# Patient Record
Sex: FEMALE | Race: WHITE | HISPANIC OR LATINO | Employment: OTHER | URBAN - METROPOLITAN AREA
[De-identification: names, ages, dates, MRNs, and addresses within clinical notes are randomized per-mention and may not be internally consistent; named-entity substitution may affect disease eponyms.]

---

## 2017-02-14 ENCOUNTER — TELEPHONE (OUTPATIENT)
Dept: OPHTHALMOLOGY | Facility: CLINIC | Age: 67
End: 2017-02-14

## 2017-02-14 DIAGNOSIS — H25.11 NUCLEAR SCLEROTIC CATARACT OF RIGHT EYE: Primary | ICD-10-CM

## 2017-03-02 ENCOUNTER — TELEPHONE (OUTPATIENT)
Dept: OPHTHALMOLOGY | Facility: CLINIC | Age: 67
End: 2017-03-02

## 2017-03-02 ENCOUNTER — TELEPHONE (OUTPATIENT)
Dept: OPTOMETRY | Facility: CLINIC | Age: 67
End: 2017-03-02

## 2017-03-02 NOTE — TELEPHONE ENCOUNTER
I spoke to .  Advised him to start surgery drops Saturday, one drop three times a day into the Right Eye.  He voiced understanding and will call back should they have any additional questions/concerns.

## 2017-03-02 NOTE — TELEPHONE ENCOUNTER
----- Message from William Fitzgerald sent at 3/1/2017 11:48 AM CST -----  Contact: Mr Pacheco  Pt has questions about medication prior to Sx,please call back at [phone] or [phone],thanks

## 2017-03-06 ENCOUNTER — ANESTHESIA EVENT (OUTPATIENT)
Dept: SURGERY | Facility: OTHER | Age: 67
End: 2017-03-06
Payer: MEDICARE

## 2017-03-06 ENCOUNTER — ANESTHESIA (OUTPATIENT)
Dept: SURGERY | Facility: OTHER | Age: 67
End: 2017-03-06
Payer: MEDICARE

## 2017-03-06 PROCEDURE — 63600175 PHARM REV CODE 636 W HCPCS: Performed by: NURSE ANESTHETIST, CERTIFIED REGISTERED

## 2017-03-06 RX ORDER — MIDAZOLAM HYDROCHLORIDE 1 MG/ML
INJECTION INTRAMUSCULAR; INTRAVENOUS
Status: DISCONTINUED | OUTPATIENT
Start: 2017-03-06 | End: 2017-03-06

## 2017-03-06 RX ADMIN — MIDAZOLAM HYDROCHLORIDE 2 MG: 1 INJECTION, SOLUTION INTRAMUSCULAR; INTRAVENOUS at 07:03

## 2017-03-06 NOTE — ANESTHESIA PREPROCEDURE EVALUATION
03/06/2017  Ines Pacheco is a 66 y.o., female.    [facility] Anesthesia Evaluation    I have reviewed the Patient Summary Reports.    I have reviewed the Nursing Notes.   I have reviewed the Medications.     Review of Systems  Anesthesia Hx:  No problems with previous Anesthesia  Denies Family Hx of Anesthesia complications.   Denies Personal Hx of Anesthesia complications.   Social:  Non-Smoker    Hematology/Oncology:  Hematology Normal        Cardiovascular:   Hypertension    Pulmonary:  Pulmonary Normal    Renal/:  Renal/ Normal     Hepatic/GI:  Hepatic/GI Normal    Musculoskeletal:   Arthritis     Neurological:  Neurology Normal    Endocrine:  Endocrine Normal        Physical Exam  General:  Well nourished    Airway/Jaw/Neck:  Airway Findings: Mouth Opening: Normal Tongue: Normal  General Airway Assessment: Adult  Mallampati: II  TM Distance: Normal, at least 6 cm         Dental:  Dental Findings:Left upper permanenet bridge             Anesthesia Plan  Type of Anesthesia, risks & benefits discussed:  Anesthesia Type:  MAC  Patient's Preference:   Intra-op Monitoring Plan:   Intra-op Monitoring Plan Comments:   Post Op Pain Control Plan:   Post Op Pain Control Plan Comments:   Induction:    Beta Blocker:         Informed Consent: Patient understands risks and agrees with Anesthesia plan.  Questions answered. Anesthesia consent signed with patient.  ASA Score: 2     Day of Surgery Review of History & Physical:    H&P update referred to the surgeon.         Ready For Surgery From Anesthesia Perspective.

## 2017-03-06 NOTE — TRANSFER OF CARE
"Anesthesia Transfer of Care Note    Patient: Ines Pacheco    Procedure(s) Performed: Procedure(s) (LRB):  PHACOEMULSIFICATION-ASPIRATION-CATARACT (Right)  INSERTION-INTRAOCULAR LENS (IOL) (Right)    Patient location: [facility]    Anesthesia Type: MAC    Transport from OR: Transported from OR on room air with adequate spontaneous ventilation    Post pain: adequate analgesia    Post assessment: no apparent anesthetic complications and tolerated procedure well    Post vital signs: stable    Level of consciousness: awake, alert and oriented    Nausea/Vomiting: no nausea/vomiting    Complications: none          Last vitals:   Visit Vitals    BP (!) 144/70 (BP Location: Left arm, Patient Position: Lying, BP Method: Automatic)    Pulse 98    Temp 37 °C (98.6 °F) (Oral)    Resp 16    Ht 5' 4" (1.626 m)    Wt 61.2 kg (135 lb)    SpO2 98%    Breastfeeding No    BMI 23.17 kg/m2     "

## 2017-03-06 NOTE — ANESTHESIA POSTPROCEDURE EVALUATION
"Anesthesia Post Evaluation    Patient: Ines Pacheco    Procedure(s) Performed: Procedure(s) (LRB):  PHACOEMULSIFICATION-ASPIRATION-CATARACT (Right)  INSERTION-INTRAOCULAR LENS (IOL) (Right)    Final Anesthesia Type: MAC  Patient location during evaluation: [facility]  Patient participation: Yes- Able to Participate  Level of consciousness: awake and alert and oriented  Post-procedure vital signs: reviewed and stable  Pain management: adequate  Airway patency: patent  PONV status at discharge: No PONV  Anesthetic complications: no      Cardiovascular status: hemodynamically stable  Respiratory status: unassisted, spontaneous ventilation and room air  Hydration status: euvolemic  Follow-up not needed.        Visit Vitals    BP (!) 144/70 (BP Location: Left arm, Patient Position: Lying, BP Method: Automatic)    Pulse 98    Temp 37 °C (98.6 °F) (Oral)    Resp 16    Ht 5' 4" (1.626 m)    Wt 61.2 kg (135 lb)    SpO2 98%    Breastfeeding No    BMI 23.17 kg/m2       Pain/Fanta Score: Pain Assessment Performed: Yes (3/6/2017  5:53 AM)  Presence of Pain: denies (3/6/2017  5:53 AM)      "

## 2017-03-07 ENCOUNTER — OFFICE VISIT (OUTPATIENT)
Dept: OPHTHALMOLOGY | Facility: CLINIC | Age: 67
End: 2017-03-07
Attending: OPHTHALMOLOGY
Payer: MEDICARE

## 2017-03-07 DIAGNOSIS — Z98.890 POST-OPERATIVE STATE: Primary | ICD-10-CM

## 2017-03-07 DIAGNOSIS — H25.11 NUCLEAR SCLEROTIC CATARACT OF RIGHT EYE: ICD-10-CM

## 2017-03-07 PROCEDURE — 99999 PR PBB SHADOW E&M-EST. PATIENT-LVL II: CPT | Mod: PBBFAC,,, | Performed by: OPHTHALMOLOGY

## 2017-03-07 PROCEDURE — 99024 POSTOP FOLLOW-UP VISIT: CPT | Mod: S$GLB,,, | Performed by: OPHTHALMOLOGY

## 2017-03-07 NOTE — PROGRESS NOTES
HPI     Post-op Evaluation    Additional comments: POD 1 Phaco OD           Comments   POD 1 Phaco w/IOL OD March 6, 2017    MEDS:    Pred/Gati/Nepaf TID OD    Patient states she is doing well with no complaints.       Last edited by Mariam Meyer on 3/7/2017  8:18 AM. (History)            Assessment /Plan     For exam results, see Encounter Report.    Post-operative state    Nuclear sclerotic cataract of right eye      Slit lamp exam:  L/L: nl  K: clear, wound sealed  AC: 1+ cell  Lens: IOL centered and stable    POD1 s/p Phaco/IOL  Appropriate precautions and post op medications reviewed.  Patient instructed to call or come in if symptoms of redness, decreased vision, or pain are experienced.

## 2017-03-07 NOTE — MR AVS SNAPSHOT
[facility]  [address]  [place] [address]  Phone: [phone]  Fax: [phone]                  Ines Pacheco   3/7/2017 8:00 AM   Office Visit    Descripción:  Female : 1950   Personal Médico:  Randee Mendez MD   Departamento:  [facility]           Razón de la lam     Post-op Evaluation           Diagnósticos de Esta Visita        Comentarios    Post-operative state    -  Primario     Nuclear sclerotic cataract of right eye                Lista de tareas           Citas próximas        Personal Médico Departamento Tfno del dpto    2017 10:00 AM Jose Fang MD [facility] [phone]      Metas (5 Years of Data)     Ninguna      Ochskirill en Llamada     Ochskirill En Llamada Línea de Enfermeras - Asistencia   Enfermeras registradas de [facility] pueden ayudarle a reservar baron lam, proveer educación para la david, asesoría clínica, y otros servicios de asesoramiento.   Llame para mary servicio gratuito a [phone].             Medicamentos           Mensaje sobre Medicamentos     Verificar los cambios y / o adiciones a jeong régimen de medicación son los mismos que discutir con jeong médico. Si cualquiera de estos cambios o adiciones son incorrectos, por favor notifique a jeong proveedor de atención médica.             Verifique que la siguiente lista de medicamentos es baron representación exacta de los medicamentos que está tomando actualmente. Si no hay ningunos reportados, la lista puede estar en telles. Si no es correcta, por favor póngase en contacto con jeong proveedor de atención médica. Lleve esta lista con usted en smitha de emergencia.           Medicamentos Actuales     amlodipine-benazepril 10-20mg (LOTREL) 10-20 mg per capsule Take 1 capsule by mouth once daily.    ammonium lactate 12 % Crea Apply to feet twice daily.    atorvastatin (LIPITOR) 10 MG tablet Take 1 tablet (10 mg total) by mouth once daily.     hydrochlorothiazide (HYDRODIURIL) 12.5 MG Tab Take 12.5 mg by mouth once daily.    calcium carbonate-vit D3-min 600 mg calcium- 400 unit Tab Take 1 tablet by mouth 2 (two) times daily.           Información de referencia clínica           Alergias     A partir del:  3/7/2017        No Known Allergies      Vacunas     Administradas en la fecha de la visita:  3/7/2017        None      Language Assistance Services     ATTENTION: Language assistance services are available, free of charge. Please call [phone].      ATENCIÓN: Si habla español, tiene a jeong disposición servicios gratuitos de asistencia lingüística. Llame al [phone].     CHÚ Ý: N?u b?n nói Ti?ng Vi?t, có các d?ch v? h? tr? ngôn ng? mi?n phí dành cho b?n. G?i s? [phone].         [facility] cumple con las leyes federales aplicables de derechos civiles y no discrimina por motivos de katie, color, origen nacional, edad, discapacidad, o sexo.                 Ines Pacheco   3/7/2017 8:00 AM   Office Visit    Description:  Female : 1950   Provider:  Randee Mendez MD   Department:  [facility]           Reason for Visit     Post-op Evaluation           Diagnoses this Visit        Comments    Post-operative state    -  Primary     Nuclear sclerotic cataract of right eye                To Do List           Future Appointments        Provider Department Dept Phone    2017 10:00 AM Jose Fang MD [facility] [phone]      Goals     None      [facility]     [facility] -  Assistance  Registered nurses in the [facility] On Call Center provide clinical advisement, health education, appointment booking, and other advisory services.  Call for this free service at [phone].             Medications           Message regarding Medications     Verify the changes and/or additions to your medication regime listed below are the same as discussed with your  clinician today.  If any of these changes or additions are incorrect, please notify your healthcare provider.             Verify that the below list of medications is an accurate representation of the medications you are currently taking.  If none reported, the list may be blank. If incorrect, please contact your healthcare provider. Carry this list with you in case of emergency.           Current Medications     amlodipine-benazepril 10-20mg (LOTREL) 10-20 mg per capsule Take 1 capsule by mouth once daily.    ammonium lactate 12 % Crea Apply to feet twice daily.    atorvastatin (LIPITOR) 10 MG tablet Take 1 tablet (10 mg total) by mouth once daily.    hydrochlorothiazide (HYDRODIURIL) 12.5 MG Tab Take 12.5 mg by mouth once daily.    calcium carbonate-vit D3-min 600 mg calcium- 400 unit Tab Take 1 tablet by mouth 2 (two) times daily.           Clinical Reference Information           Allergies as of 3/7/2017     No Known Allergies      Immunizations Administered on Date of Encounter - 3/7/2017     None      Language Assistance Services     ATTENTION: Language assistance services are available, free of charge. Please call [phone].      ATENCIÓN: Si habla español, tiene a jeong disposición servicios gratuitos de asistencia lingüística. Llame al [phone].     EUN Ý: N?u b?n nói Ti?ng Vi?t, có các d?ch v? h? tr? ngôn ng? mi?n phí dành cho b?n. G?i s? [phone].         [facility] complies with applicable Federal civil rights laws and does not discriminate on the basis of race, color, national origin, age, disability, or sex.

## 2017-03-14 ENCOUNTER — OFFICE VISIT (OUTPATIENT)
Dept: OPHTHALMOLOGY | Facility: CLINIC | Age: 67
End: 2017-03-14
Attending: OPHTHALMOLOGY
Payer: MEDICARE

## 2017-03-14 ENCOUNTER — TELEPHONE (OUTPATIENT)
Dept: OPHTHALMOLOGY | Facility: CLINIC | Age: 67
End: 2017-03-14

## 2017-03-14 DIAGNOSIS — H25.12 NUCLEAR SCLEROSIS, LEFT: ICD-10-CM

## 2017-03-14 DIAGNOSIS — H25.12 NUCLEAR SCLEROTIC CATARACT OF LEFT EYE: Primary | ICD-10-CM

## 2017-03-14 DIAGNOSIS — H25.11 NUCLEAR SCLEROTIC CATARACT OF RIGHT EYE: ICD-10-CM

## 2017-03-14 DIAGNOSIS — Z98.890 POST-OPERATIVE STATE: Primary | ICD-10-CM

## 2017-03-14 PROCEDURE — 99024 POSTOP FOLLOW-UP VISIT: CPT | Mod: S$GLB,,, | Performed by: OPHTHALMOLOGY

## 2017-03-14 PROCEDURE — 99999 PR PBB SHADOW E&M-EST. PATIENT-LVL II: CPT | Mod: PBBFAC,,, | Performed by: OPHTHALMOLOGY

## 2017-03-14 RX ORDER — TROPICAMIDE 10 MG/ML
1 SOLUTION/ DROPS OPHTHALMIC
Status: CANCELLED | OUTPATIENT
Start: 2017-03-14

## 2017-03-14 RX ORDER — TETRACAINE HYDROCHLORIDE 5 MG/ML
1 SOLUTION OPHTHALMIC
Status: CANCELLED | OUTPATIENT
Start: 2017-03-14

## 2017-03-14 RX ORDER — LIDOCAINE HYDROCHLORIDE 10 MG/ML
1 INJECTION, SOLUTION EPIDURAL; INFILTRATION; INTRACAUDAL; PERINEURAL ONCE
Status: CANCELLED | OUTPATIENT
Start: 2017-03-14 | End: 2017-03-14

## 2017-03-14 RX ORDER — PHENYLEPHRINE HYDROCHLORIDE 25 MG/ML
1 SOLUTION/ DROPS OPHTHALMIC
Status: CANCELLED | OUTPATIENT
Start: 2017-03-14

## 2017-03-14 RX ORDER — MOXIFLOXACIN 5 MG/ML
1 SOLUTION/ DROPS OPHTHALMIC
Status: CANCELLED | OUTPATIENT
Start: 2017-03-14

## 2017-03-14 NOTE — PROGRESS NOTES
HPI     Post-op Evaluation    Additional comments: POW 1 Phaco OD           Comments   POW 1 Phaco w/IOL OD March 6, 2017    MEDS:    Pred/Gati/Nepaf TID OD    Patient states she is doing well with no complaints.       Last edited by Mariam Meyer on 3/14/2017  9:53 AM. (History)            Assessment /Plan     For exam results, see Encounter Report.    Post-operative state    Nuclear sclerotic cataract of right eye      Slit lamp exam:  L/L: nl  K: clear, wound sealed  AC: trace cell  Iris/Lens: IOL centered and stable    POW1 s/p phaco: Surgery healing well with no signs of infection or abnormal inflammation.    Patient wishes to proceed with surgery in the second eye. Risks, benefits, alternatives reviewed. IOL selection reviewed.     Left eye  IOL: pcboo 24.5

## 2017-03-14 NOTE — MR AVS SNAPSHOT
[facility]  [address]  [place] [address]  Phone: [phone]  Fax: [phone]                  Ines Pacheco   3/14/2017 9:45 AM   Office Visit    Descripción:  Female : 1950   Personal Médico:  Randee Mendez MD   Departamento:  [facility]           Razón de la lam     Post-op Evaluation           Diagnósticos de Esta Visita        Comentarios    Post-operative state    -  Primario     Nuclear sclerotic cataract of right eye                Lista de tareas           Citas próximas        Personal Médico Departamento Tfno del dpto    2017 10:00 AM Jose Fang MD [facility] [phone]      Metas (5 Years of Data)     Ninguna      Ochskirill en Llamada     Ochskirill En Llamada Línea de Enfermeras - Asistencia   Enfermeras registradas de [facility] pueden ayudarle a reservar baron lam, proveer educación para la david, asesoría clínica, y otros servicios de asesoramiento.   Llame para mary servicio gratuito a [phone].             Medicamentos           Mensaje sobre Medicamentos     Verificar los cambios y / o adiciones a jeong régimen de medicación son los mismos que discutir con jeong médico. Si cualquiera de estos cambios o adiciones son incorrectos, por favor notifique a jeong proveedor de atención médica.             Verifique que la siguiente lista de medicamentos es baron representación exacta de los medicamentos que está tomando actualmente. Si no hay ningunos reportados, la lista puede estar en telles. Si no es correcta, por favor póngase en contacto con jeong proveedor de atención médica. Lleve esta lista con usted en smitha de emergencia.           Medicamentos Actuales     amlodipine-benazepril 10-20mg (LOTREL) 10-20 mg per capsule Take 1 capsule by mouth once daily.    ammonium lactate 12 % Crea Apply to feet twice daily.    atorvastatin (LIPITOR) 10 MG tablet Take 1 tablet (10 mg total) by mouth once daily.     hydrochlorothiazide (HYDRODIURIL) 12.5 MG Tab Take 12.5 mg by mouth once daily.    calcium carbonate-vit D3-min 600 mg calcium- 400 unit Tab Take 1 tablet by mouth 2 (two) times daily.           Información de referencia clínica           Alergias     A partir del:  3/14/2017        No Known Allergies      Vacunas     Administradas en la fecha de la visita:  3/14/2017        None      Language Assistance Services     ATTENTION: Language assistance services are available, free of charge. Please call [phone].      ATENCIÓN: Si habla español, tiene a jeong disposición servicios gratuitos de asistencia lingüística. Llame al [phone].     CHÚ Ý: N?u b?n nói Ti?ng Vi?t, có các d?ch v? h? tr? ngôn ng? mi?n phí dành cho b?n. G?i s? [phone].         [facility] cumple con las leyes federales aplicables de derechos civiles y no discrimina por motivos de katie, color, origen nacional, edad, discapacidad, o sexo.                 Ines Pacheco   3/14/2017 9:45 AM   Office Visit    Description:  Female : 1950   Provider:  Randee Mendez MD   Department:  [facility]           Reason for Visit     Post-op Evaluation           Diagnoses this Visit        Comments    Post-operative state    -  Primary     Nuclear sclerotic cataract of right eye                To Do List           Future Appointments        Provider Department Dept Phone    2017 10:00 AM Jose Fang MD [facility] [phone]      Goals     None      [facility]     [facility] Call Nurse Care Line -  Assistance  Registered nurses in the [facility] On Call Center provide clinical advisement, health education, appointment booking, and other advisory services.  Call for this free service at [phone].             Medications           Message regarding Medications     Verify the changes and/or additions to your medication regime listed below are the same as discussed with your  clinician today.  If any of these changes or additions are incorrect, please notify your healthcare provider.             Verify that the below list of medications is an accurate representation of the medications you are currently taking.  If none reported, the list may be blank. If incorrect, please contact your healthcare provider. Carry this list with you in case of emergency.           Current Medications     amlodipine-benazepril 10-20mg (LOTREL) 10-20 mg per capsule Take 1 capsule by mouth once daily.    ammonium lactate 12 % Crea Apply to feet twice daily.    atorvastatin (LIPITOR) 10 MG tablet Take 1 tablet (10 mg total) by mouth once daily.    hydrochlorothiazide (HYDRODIURIL) 12.5 MG Tab Take 12.5 mg by mouth once daily.    calcium carbonate-vit D3-min 600 mg calcium- 400 unit Tab Take 1 tablet by mouth 2 (two) times daily.           Clinical Reference Information           Allergies as of 3/14/2017     No Known Allergies      Immunizations Administered on Date of Encounter - 3/14/2017     None      Language Assistance Services     ATTENTION: Language assistance services are available, free of charge. Please call [phone].      ATENCIÓN: Si habla español, tiene a jeong disposición servicios gratuitos de asistencia lingüística. Llame al [phone].     EUN Ý: N?u b?n nói Ti?ng Vi?t, có các d?ch v? h? tr? ngôn ng? mi?n phí dành cho b?n. G?i s? [phone].         [facility] complies with applicable Federal civil rights laws and does not discriminate on the basis of race, color, national origin, age, disability, or sex.

## 2017-03-20 ENCOUNTER — TELEPHONE (OUTPATIENT)
Dept: OPHTHALMOLOGY | Facility: CLINIC | Age: 67
End: 2017-03-20

## 2017-03-20 NOTE — TELEPHONE ENCOUNTER
----- Message from William Fitzgerald sent at 3/20/2017  3:55 PM CDT -----  Contact: Ines Pacheco [[id number]]  Pt was told to use medication up until a certain date,but the bottle of drops states otherwise,please call back at [phone],thanks

## 2017-03-20 NOTE — TELEPHONE ENCOUNTER
Called pt and sp with her  and informed him she can take the drops until 04/06/17. One month following her surgery.

## 2017-04-19 ENCOUNTER — TELEPHONE (OUTPATIENT)
Dept: OPTOMETRY | Facility: CLINIC | Age: 67
End: 2017-04-19

## 2017-04-20 NOTE — PRE ADMISSION SCREENING
Spoke with  who stated pt wants him to answer all questions. Medications reviewed. Instructed to hold HCTZ.  ed COLEMAN reviewed.

## 2017-04-24 ENCOUNTER — ANESTHESIA EVENT (OUTPATIENT)
Dept: SURGERY | Facility: OTHER | Age: 67
End: 2017-04-24
Payer: MEDICARE

## 2017-04-24 ENCOUNTER — ANESTHESIA (OUTPATIENT)
Dept: SURGERY | Facility: OTHER | Age: 67
End: 2017-04-24
Payer: MEDICARE

## 2017-04-24 ENCOUNTER — HOSPITAL ENCOUNTER (OUTPATIENT)
Facility: OTHER | Age: 67
Discharge: HOME | End: 2017-04-24
Attending: OPHTHALMOLOGY | Admitting: OPHTHALMOLOGY
Payer: MEDICARE

## 2017-04-24 VITALS
DIASTOLIC BLOOD PRESSURE: 65 MMHG | HEIGHT: 60 IN | WEIGHT: 140 LBS | RESPIRATION RATE: 16 BRPM | TEMPERATURE: 98 F | HEART RATE: 74 BPM | SYSTOLIC BLOOD PRESSURE: 129 MMHG | BODY MASS INDEX: 27.48 KG/M2 | OXYGEN SATURATION: 97 %

## 2017-04-24 DIAGNOSIS — Z98.890 POST-OPERATIVE STATE: ICD-10-CM

## 2017-04-24 DIAGNOSIS — H25.12 NUCLEAR SCLEROSIS, LEFT: ICD-10-CM

## 2017-04-24 DIAGNOSIS — H25.12 NUCLEAR SCLEROTIC CATARACT OF LEFT EYE: Primary | ICD-10-CM

## 2017-04-24 PROCEDURE — 63600175 PHARM REV CODE 636 W HCPCS: Performed by: NURSE ANESTHETIST, CERTIFIED REGISTERED

## 2017-04-24 PROCEDURE — V2632 POST CHMBR INTRAOCULAR LENS: HCPCS | Performed by: OPHTHALMOLOGY

## 2017-04-24 PROCEDURE — 36000707: Performed by: OPHTHALMOLOGY

## 2017-04-24 PROCEDURE — 25000003 PHARM REV CODE 250: Performed by: OPHTHALMOLOGY

## 2017-04-24 PROCEDURE — 36000706: Performed by: OPHTHALMOLOGY

## 2017-04-24 PROCEDURE — 37000009 HC ANESTHESIA EA ADD 15 MINS: Performed by: OPHTHALMOLOGY

## 2017-04-24 PROCEDURE — 37000008 HC ANESTHESIA 1ST 15 MINUTES: Performed by: OPHTHALMOLOGY

## 2017-04-24 PROCEDURE — 66984 XCAPSL CTRC RMVL W/O ECP: CPT | Mod: 79,LT,, | Performed by: OPHTHALMOLOGY

## 2017-04-24 PROCEDURE — 71000015 HC POSTOP RECOV 1ST HR: Performed by: OPHTHALMOLOGY

## 2017-04-24 DEVICE — IMPLANTABLE DEVICE: Type: IMPLANTABLE DEVICE | Site: EYE | Status: FUNCTIONAL

## 2017-04-24 RX ORDER — MIDAZOLAM HYDROCHLORIDE 1 MG/ML
INJECTION INTRAMUSCULAR; INTRAVENOUS
Status: DISCONTINUED | OUTPATIENT
Start: 2017-04-24 | End: 2017-04-24

## 2017-04-24 RX ORDER — ACETAMINOPHEN 325 MG/1
650 TABLET ORAL EVERY 4 HOURS PRN
Status: DISCONTINUED | OUTPATIENT
Start: 2017-04-24 | End: 2017-04-24 | Stop reason: HOSPADM

## 2017-04-24 RX ORDER — PROPARACAINE HYDROCHLORIDE 5 MG/ML
1 SOLUTION/ DROPS OPHTHALMIC
Status: DISCONTINUED | OUTPATIENT
Start: 2017-04-24 | End: 2017-04-24 | Stop reason: HOSPADM

## 2017-04-24 RX ORDER — TETRACAINE HYDROCHLORIDE 5 MG/ML
SOLUTION OPHTHALMIC
Status: DISCONTINUED | OUTPATIENT
Start: 2017-04-24 | End: 2017-04-24 | Stop reason: HOSPADM

## 2017-04-24 RX ORDER — LIDOCAINE HYDROCHLORIDE 40 MG/ML
INJECTION, SOLUTION RETROBULBAR
Status: DISCONTINUED | OUTPATIENT
Start: 2017-04-24 | End: 2017-04-24 | Stop reason: HOSPADM

## 2017-04-24 RX ORDER — TROPICAMIDE 10 MG/ML
1 SOLUTION/ DROPS OPHTHALMIC
Status: COMPLETED | OUTPATIENT
Start: 2017-04-24 | End: 2017-04-24

## 2017-04-24 RX ORDER — MOXIFLOXACIN 5 MG/ML
SOLUTION/ DROPS OPHTHALMIC
Status: DISCONTINUED | OUTPATIENT
Start: 2017-04-24 | End: 2017-04-24 | Stop reason: HOSPADM

## 2017-04-24 RX ORDER — LIDOCAINE HYDROCHLORIDE 10 MG/ML
1 INJECTION, SOLUTION EPIDURAL; INFILTRATION; INTRACAUDAL; PERINEURAL ONCE
Status: DISCONTINUED | OUTPATIENT
Start: 2017-04-24 | End: 2017-04-24 | Stop reason: HOSPADM

## 2017-04-24 RX ORDER — MOXIFLOXACIN 5 MG/ML
1 SOLUTION/ DROPS OPHTHALMIC
Status: COMPLETED | OUTPATIENT
Start: 2017-04-24 | End: 2017-04-24

## 2017-04-24 RX ORDER — PHENYLEPHRINE HYDROCHLORIDE 25 MG/ML
1 SOLUTION/ DROPS OPHTHALMIC
Status: COMPLETED | OUTPATIENT
Start: 2017-04-24 | End: 2017-04-24

## 2017-04-24 RX ORDER — TETRACAINE HYDROCHLORIDE 5 MG/ML
1 SOLUTION OPHTHALMIC
Status: COMPLETED | OUTPATIENT
Start: 2017-04-24 | End: 2017-04-24

## 2017-04-24 RX ADMIN — PHENYLEPHRINE HYDROCHLORIDE 1 DROP: 25 SOLUTION/ DROPS OPHTHALMIC at 08:04

## 2017-04-24 RX ADMIN — MOXIFLOXACIN HYDROCHLORIDE 1 DROP: 5 SOLUTION/ DROPS OPHTHALMIC at 08:04

## 2017-04-24 RX ADMIN — TROPICAMIDE 1 DROP: 10 SOLUTION/ DROPS OPHTHALMIC at 08:04

## 2017-04-24 RX ADMIN — TETRACAINE HYDROCHLORIDE 1 DROP: 5 SOLUTION OPHTHALMIC at 08:04

## 2017-04-24 RX ADMIN — MOXIFLOXACIN HYDROCHLORIDE 1 DROP: 5 SOLUTION/ DROPS OPHTHALMIC at 10:04

## 2017-04-24 RX ADMIN — MIDAZOLAM HYDROCHLORIDE 2 MG: 1 INJECTION, SOLUTION INTRAMUSCULAR; INTRAVENOUS at 09:04

## 2017-04-24 NOTE — OP NOTE
SURGEON:  Randee Mendez M.D.    PREOPERATIVE DIAGNOSIS:    Nuclear Sclerotic Cataract Left Eye    POSTOPERATIVE DIAGNOSIS:    Nuclear Sclerotic Cataract Left Eye    PROCEDURES:    Phacoemulsification with  intraocular lens, Left eye (94401)    DATE OF SURGERY: 04/24/2017    IMPLANT: pcboo 24.5    ANESTHESIA:  MAC with topical Lidocaine    COMPLICATIONS:  None    ESTIMATED BLOOD LOSS: None    SPECIMENS: None    INDICATIONS:    The patient has a history of painless progressive visual loss and  difficulty with activities of daily living secondary to cataract formation.  After a thorough discussion of the risks, benefits, and alternatives to cataract surgery, including, but not limited to, the rare risks of infection, retinal detachment, hemorrhage, need for additional surgery, loss of vision, and even loss of the eye, the patient voices understanding and desires to proceed.    DESCRIPTION OF PROCEDURE:    The patients IOL calculations were reviewed, and the lens selection confirmed.   After verification and marking of the proper eye in the preop holding area, the patient was brought to the operating room in supine position where the eye was prepped and draped in standard sterile fashion with 5% Betadine and a lid speculum placed in the eye.   Topical 4% Lidocaine was used in addition to the preoperative anesthesia and the procedure was begun by the creation of a paracentesis incision through which viscoelastic was used to fill the anterior chamber.  Next, a keratome blade was used to create a triplanar temporal clear corneal incision and a cystotome and Utrata forceps used to fashion a continuous curvilinear capsulorrhexis.  Hydrodissection was carried out using the Villegas hydrodissection cannula and the nucleus was found to be mobile.  Phacoemulsification of the nucleus was carried out using a quick chop technique, and all remaining epinuclear and cortical material was removed.  The eye was then reformed with  Viscoelastic and the  intraocular lens was implanted into the capsular bag.  All remaining viscoelastics were removed from the eye and at the end of the case the pupil was round, the lens was well-centered within the capsular bag and all wounds were found to be water tight.  Drops of Vigamox and Pred Forte were instilled and a shield was placed over the eye. The patient will follow up with Dr. Mendez in the morning.

## 2017-04-24 NOTE — IP AVS SNAPSHOT
[facility] Location ([facility])  [address] [place] [address]  Phone: [phone]           Instrucciones de Laura de Pacientes  Nuestra meta es prepararlo para el éxito. Anastasia paquete incluye información sobre jeong condición, medicamentos e instrucciones para cuidados en el hogar. [place] le ayudará a cuidarse y prevenir la necesidad de volver al hospital.     Por favor, hable con jeong enfermero o enfermera si tiene alguna pregunta..     Hay muchos detalles a recordar cuando se prepara para salir del hospital. [place] es lo que necesita hacer:    1. [place] jeong medicina. Si usted tiene baron receta, revise la lista de medicamentos en las siguientes páginas. Es posible que tenga nuevos medicamentos para recoger en la farmacia y otros que tendrá que dejar de david. Revise las instrucciones sobre cómo y cuándo david wendy medicamentos. Consulte con el médico o el enfermeras si no está seguro de qué hacer.    2. Ir a wendy citas de seguimiento. La información específica de seguimiento aparece en las siguientes páginas. Usted puede ser contactado por baron enfermera o proveedor clínico sobre las próximas citas. Estar seguro de que tiene todos los números de teléfono para comunicarse si es necesario. Por favor, póngase en contacto con la oficina de jeong profesional médico si no puede hacer baron lam.     3. Esté atento a señales de advertencia. El médico o la enfermera le dará señales de alarma detallados que debe observar y cuándo llamar para la ayuda. Estas instrucciones también pueden incluir información educativa acerca de jeong condición. Si usted experimenta cualquiera de las señales de advertencia para jeong david, llame a jeong médico.             [facility]    Si jeong médico no le ha indicado a lo contrário, por favor   contactar a [facility], nuestra línea de cuidado de enfermeras está disponible para asistirle 24/7.    [phone] (servicio gratuito)    Enfermeras registradas de [facility] pueden ayudarle a  reservar baron lam, proveer educación para la david, asesoría clínica, y otros servicios de asesoramiento.                  ** Verificar la lista de medicamentos es correcta y está actualizada. Llevar esto con usted en smitha de emergencia. Si pauly medicamentos childers cambiado, por favor notifique a jeong proveedor de atención médica.             Lista de medicamentos      CONSULTE con jeong médico sobre estos medicamentos        Additional Info                      amlodipine-benazepril 10-20mg 10-20 mg per capsule   También conocido vladimir:  LOTREL   Cantidad:  90 capsule   Recargas:  3   Dosis:  1 capsule    Instrucciones:  Take 1 capsule by mouth once daily.     Begin Date    AM    Noon    PM    Bedtime       ammonium lactate 12 % Crea   Cantidad:  140 g   Recargas:  5    Instrucciones:  Apply to feet twice daily.     Begin Date    AM    Noon    PM    Bedtime       atorvastatin 10 MG tablet   También conocido vladimir:  LIPITOR   Cantidad:  90 tablet   Recargas:  3   Dosis:  10 mg    Instrucciones:  Take 1 tablet (10 mg total) by mouth once daily.     Begin Date    AM    Noon    PM    Bedtime       calcium carbonate-vit D3-min 600 mg calcium- 400 unit Tab   Cantidad:  180 tablet   Recargas:  1   Dosis:  1 tablet    Instrucciones:  Take 1 tablet by mouth 2 (two) times daily.     Begin Date    AM    Noon    PM    Bedtime       hydrochlorothiazide 12.5 MG Tab   También conocido vladimir:  HYDRODIURIL   Recargas:  0   Dosis:  12.5 mg    Instrucciones:  Take 12.5 mg by mouth once daily.     Begin Date    AM    Noon    PM    Bedtime                  Por favor traiga a todos las citas de seguimiento:    1. Baron copia de las instrucciones de alvarez.  2. Todos los medicamentos que está tomando mary momento, en pauly envases originales.  3. Identificación y tarjeta de seguro.    Por favor llegue 15 minutos antes de la hora de la lam.    Por favor llame con 24 horas de antelación si tiene que cambiar jeong lam y / o tiempo.        Pauly Citas  Programadas     Apr 25, 2017  8:45 AM CDT   Post OP with Randee Mendez MD   [facility] ([facility])    [address]  [place] [id number] [phone]            May 08, 2017 10:00 AM CDT   Established Patient Visit with Jose Fang MD   [facility] ([facility])    [address] [place] [address] [phone]                  Instrucciones a luisito de alvarez       Home Care Instructions for Eye Surgeries    1. ACTIVITY:   Limit your activity today. Increase activity gradually. You may return to work or school as directed by your physician.    2. DIET:   Drink plenty of fluids. Resume your normal diet unless instructed otherwise.  3. PAIN:   Expect a moderate amount of pain. If a prescription for pain is not sent home with you, you may take your commonly used pain reliever as directed. If this is not sufficient, call your physician. You may resume any other prescription medication unless otherwise directed by your physician.     4. DRESSING:   Keep your dressing dry and intact.  5. COMMENTS:   No driving, operating heavy machinery or singing legal documents for 24 hours.    No bending forward at the waist.   See attached schedule of eye drops.    Discuss any problem with your physician as soon as it arises. Do not Delay.      EMERGENCY- If you are unable to contact your physician, please go to the nearest Emergency Room.       Anesthesia: Monitored Anesthesia Care (MAC)    Anesthesia Safety  · Have an adult family member or friend drive you home after the procedure.  · For the first 24 hours after your surgery:  ¨ Do not drive or use heavy equipment.  ¨ Do not make important decisions or sign documents.  ¨ Avoid alcohol.  ¨ Have someone stay with you, if possible. They can watch for problems and help keep you safe.          Información de Admisión     Fecha y hora Proveedor Departamento CSN    4/24/2017  7:47 AM Randee Mendez MD [facility] [id number]      Los proveedores de cuidado     Personal Médico Rol [facility] Teléfono principal de la oficina    Randee Mendez MD Attending Provider Ophthalmology [phone]    Randee Mendez MD Surgeon  Ophthalmology [phone]      Pauly signos vitales heather     PS Pulso Temperatura Resp [place] Peso    157/85 (BP Location: Right arm, Patient Position: Lying, BP Method: Automatic) 58 97.8 °F (36.6 °C) (Oral) 16 5' (1.524 m) 63.5 kg (140 lb)    SpO2 BMI (IM[facility])                99% 27.34 kg/m2          Recent Lab Values        10/20/2006 3/8/2007 9/13/2007 3/11/2008 12/9/2008 10/15/2009           10:06 AM  9:50 AM 11:35 AM 10:58 AM 10:11 AM  9:44 AM      A1C 6.0 6.0 5.8 6.0 6.0 5.9             Alergias     A partir del:  4/24/2017        No Known Allergies      Directiva Anticipada     Jessie directiva anticipada es un documento que, en smitha de que ya no capaz de hacer decisiones por sí mismo, le dice a jeong equipo médico qué tipo de tratamiento quiere o no quiere recibir, o que le gustaría david esas decisiones para usted . Si actualmente no tiene jessie directiva anticipada, [facility] le anima a crear pili. Para más información llame al: [phone] ([phone]), o entrando en [email or web address].        Language Assistance Services     ATTENTION: Language assistance services are available, free of charge. Please call [phone].      ATENCIÓN: Si habla español, tiene a jeong disposición servicios gratuitos de asistencia lingüística. Llame al [phone].     CHÚ Ý: N?u b?n nói Ti?ng Vi?t, có các d?ch v? h? tr? ngôn ng? mi?n phí dành cho b?n. G?i s? [phone].         [facility] cumple con las leyes federales aplicables de derechos civiles y no discrimina por motivos de katie, color, origen nacional, edad, discapacidad, o sexo.                        [facility] Location ([facility])  [address] [place] [address]  Phone: [phone]            Patient Discharge Instructions   Our goal is to set you up for success. This packet includes information on your condition, medications, and your home care.  It will help you care for yourself to prevent having to return to the hospital.     Please ask your nurse if you have any questions.      There are many details to remember when preparing to leave the hospital. Here is what you will need to do:    1. Take your medicine. If you are prescribed medications, review your Medication List on the following pages. You may have new medications to  at the pharmacy and others that you'll need to stop taking. Review the instructions for how and when to take your medications. Talk with your doctor or nurses if you are unsure of what to do.     2. Go to your follow-up appointments. Specific follow-up information is listed in the following pages. Your may be contacted by a nurse or clinical provider about future appointments. Be sure we have all of the phone numbers to reach you. Please contact your provider's office if you are unable to make an appointment.     3. Watch for warning signs. Your doctor or nurse will give you detailed warning signs to watch for and when to call for assistance. These instructions may also include educational information about your condition. If you experience any of warning signs to your health, call your doctor.           [facility]  Unless otherwise directed by your provider, please   contact [facility], our nurse care line   that is available for 24/7 assistance.     [phone] (toll-free)     Registered nurses in the [facility]   provide: appointment scheduling, clinical advisement, health education, and other advisory services.              ** Verificar la lista de medicamentos es correcta y está actualizada. Llevar esto con usted en smitha de emergencia. Si wendy medicamentos childers cambiado, por favor notifique a jeong proveedor de atención médica.              Medication List      ASK your doctor about these medications        Additional Info                      amlodipine-benazepril 10-20mg 10-20 mg per capsule   Commonly known as:  LOTREL   Quantity:  90 capsule   Refills:  3   Dose:  1 capsule    Instructions:  Take 1 capsule by mouth once daily.     Begin Date    AM    Noon    PM    Bedtime       ammonium lactate 12 % Crea   Quantity:  140 g   Refills:  5    Instructions:  Apply to feet twice daily.     Begin Date    AM    Noon    PM    Bedtime       atorvastatin 10 MG tablet   Commonly known as:  LIPITOR   Quantity:  90 tablet   Refills:  3   Dose:  10 mg    Instructions:  Take 1 tablet (10 mg total) by mouth once daily.     Begin Date    AM    Noon    PM    Bedtime       calcium carbonate-vit D3-min 600 mg calcium- 400 unit Tab   Quantity:  180 tablet   Refills:  1   Dose:  1 tablet    Instructions:  Take 1 tablet by mouth 2 (two) times daily.     Begin Date    AM    Noon    PM    Bedtime       hydrochlorothiazide 12.5 MG Tab   Commonly known as:  HYDRODIURIL   Refills:  0   Dose:  12.5 mg    Instructions:  Take 12.5 mg by mouth once daily.     Begin Date    AM    Noon    PM    Bedtime                  Por favor traiga a todos las citas de seguimiento:    1. Jessie copia de las instrucciones de alvarez.  2. Todos los medicamentos que está tomando mary momento, en wendy envases originales.  3. Identificación y tarjeta de seguro.    Por favor llegue 15 minutos antes de la hora de la lam.    Por favor llame con 24 horas de antelación si tiene que cambiar jeong lam y / o tiempo.        Your Scheduled Appointments     Apr 25, 2017  8:45 AM CDT   Post OP with Randee Mendez MD   [facility] ([facility])    [address]  [place] [address] [phone]            May 08, 2017 10:00 AM CDT   Established Patient Visit with Jose Fang MD   [facility] ([facility])    [address]  [place] [address]    [phone]                  Discharge Instructions       Home Care Instructions for Eye Surgeries    1. ACTIVITY:   Limit your activity today. Increase activity gradually. You may return to work or school as directed by your physician.    2. DIET:   Drink plenty of fluids. Resume your normal diet unless instructed otherwise.  3. PAIN:   Expect a moderate amount of pain. If a prescription for pain is not sent home with you, you may take your commonly used pain reliever as directed. If this is not sufficient, call your physician. You may resume any other prescription medication unless otherwise directed by your physician.     4. DRESSING:   Keep your dressing dry and intact.  5. COMMENTS:   No driving, operating heavy machinery or singing legal documents for 24 hours.    No bending forward at the waist.   See attached schedule of eye drops.    Discuss any problem with your physician as soon as it arises. Do not Delay.      EMERGENCY- If you are unable to contact your physician, please go to the nearest Emergency Room.       Anesthesia: Monitored Anesthesia Care (MAC)    Anesthesia Safety  · Have an adult family member or friend drive you home after the procedure.  · For the first 24 hours after your surgery:  ¨ Do not drive or use heavy equipment.  ¨ Do not make important decisions or sign documents.  ¨ Avoid alcohol.  ¨ Have someone stay with you, if possible. They can watch for problems and help keep you safe.          Admission Information     Date & Time Provider Department CSN    4/24/2017  7:47 AM Randee Mendez MD [facility] [id number]      Care Providers     Provider Role Specialty Primary office phone    Randee Mendez MD Attending Provider Ophthalmology [phone]    Randee Mendez MD Surgeon  Ophthalmology [phone]      Your Vitals Were     BP Pulse Temp Resp Height Weight    157/85 (BP Location: Right arm, Patient Position: Lying, BP Method: Automatic) 58 97.8 °F (36.6 °C)  (Oral) 16 5' (1.524 m) 63.5 kg (140 lb)    SpO2 BMI                99% 27.34 kg/m2          Recent Lab Values        10/20/2006 3/8/2007 9/13/2007 3/11/2008 12/9/2008 10/15/2009           10:06 AM  9:50 AM 11:35 AM 10:58 AM 10:11 AM  9:44 AM      A1C 6.0 6.0 5.8 6.0 6.0 5.9             Allergies as of 4/24/2017     No Known Allergies      Advance Directives     An advance directive is a document which, in the event you are no longer able to make decisions for yourself, tells your healthcare team what kind of treatment you do or do not want to receive, or who you would like to make those decisions for you.  If you do not currently have an advance directive, [facility] encourages you to create one.  For more information call:  [phone]), [phone] ([phone]),  or log on to [email or web address].        Language Assistance Services     ATTENTION: Language assistance services are available, free of charge. Please call [phone].      ATENCIÓN: Si habla español, tiene a jeong disposición servicios gratuitos de asistencia lingüística. Llame al [phone].     CHÚ Ý: N?u b?n nói Ti?ng Vi?t, có các d?ch v? h? tr? ngôn ng? mi?n phí dành cho b?n. G?i s? [phone].         [facility] complies with applicable Federal civil rights laws and does not discriminate on the basis of race, color, national origin, age, disability, or sex.

## 2017-04-24 NOTE — ANESTHESIA PREPROCEDURE EVALUATION
04/24/2017  Ines Pacheco is a 66 y.o., female.    Anesthesia Evaluation    I have reviewed the Patient Summary Reports.    I have reviewed the Nursing Notes.   I have reviewed the Medications.     Review of Systems  Anesthesia Hx:  No problems with previous Anesthesia  Denies Family Hx of Anesthesia complications.   Denies Personal Hx of Anesthesia complications.   Social:  Non-Smoker    Hematology/Oncology:  Hematology Normal        Cardiovascular:   Hypertension    Pulmonary:  Pulmonary Normal    Renal/:  Renal/ Normal     Hepatic/GI:  Hepatic/GI Normal    Musculoskeletal:   Arthritis     Neurological:  Neurology Normal    Endocrine:  Endocrine Normal        Physical Exam  General:  Well nourished    Airway/Jaw/Neck:  Airway Findings: Mouth Opening: Normal Tongue: Normal  General Airway Assessment: Adult  Mallampati: II  TM Distance: Normal, at least 6 cm         Dental:  Dental Findings:Left upper permanenet bridge             Anesthesia Plan  Type of Anesthesia, risks & benefits discussed:  Anesthesia Type:  MAC  Patient's Preference:   Intra-op Monitoring Plan:   Intra-op Monitoring Plan Comments:   Post Op Pain Control Plan:   Post Op Pain Control Plan Comments:   Induction:    Beta Blocker:         Informed Consent: Patient understands risks and agrees with Anesthesia plan.  Questions answered. Anesthesia consent signed with patient.  ASA Score: 2     Day of Surgery Review of History & Physical:    H&P update referred to the surgeon.         Ready For Surgery From Anesthesia Perspective.

## 2017-04-24 NOTE — DISCHARGE INSTRUCTIONS
Home Care Instructions for Eye Surgeries    1. ACTIVITY:   Limit your activity today. Increase activity gradually. You may return to work or school as directed by your physician.    2. DIET:   Drink plenty of fluids. Resume your normal diet unless instructed otherwise.  3. PAIN:   Expect a moderate amount of pain. If a prescription for pain is not sent home with you, you may take your commonly used pain reliever as directed. If this is not sufficient, call your physician. You may resume any other prescription medication unless otherwise directed by your physician.     4. DRESSING:   Keep your dressing dry and intact.  5. COMMENTS:   No driving, operating heavy machinery or singing legal documents for 24 hours.    No bending forward at the waist.   See attached schedule of eye drops.    Discuss any problem with your physician as soon as it arises. Do not Delay.      EMERGENCY- If you are unable to contact your physician, please go to the nearest Emergency Room.       Anesthesia: Monitored Anesthesia Care (MAC)    Anesthesia Safety  · Have an adult family member or friend drive you home after the procedure.  · For the first 24 hours after your surgery:  ¨ Do not drive or use heavy equipment.  ¨ Do not make important decisions or sign documents.  ¨ Avoid alcohol.  ¨ Have someone stay with you, if possible. They can watch for problems and help keep you safe.

## 2017-04-24 NOTE — ANESTHESIA POSTPROCEDURE EVALUATION
Anesthesia Post Evaluation    Patient: Ines Pacheco    Procedure(s) Performed: Procedure(s) (LRB):  PHACOEMULSIFICATION-ASPIRATION-CATARACT (Left)  INSERTION-INTRAOCULAR LENS (IOL) (Left)    Final Anesthesia Type: MAC  Patient location during evaluation: [facility]  Patient participation: Yes- Able to Participate  Level of consciousness: awake and alert  Post-procedure vital signs: reviewed and stable  Pain management: adequate  Airway patency: patent  PONV status at discharge: No PONV  Anesthetic complications: no      Cardiovascular status: blood pressure returned to baseline  Respiratory status: unassisted and room air  Hydration status: euvolemic  Follow-up not needed.        Visit Vitals    BP (!) 157/85 (BP Location: Right arm, Patient Position: Lying, BP Method: Automatic)    Pulse (!) 58    Temp 36.6 °C (97.8 °F) (Oral)    Resp 16    Ht 5' (1.524 m)    Wt 63.5 kg (140 lb)    SpO2 99%    Breastfeeding No    BMI 27.34 kg/m2       Pain/Fanta Score: Pain Assessment Performed: Yes (4/24/2017  8:41 AM)  Presence of Pain: denies (4/24/2017  8:41 AM)  Fanta Score: 10 (4/24/2017  8:41 AM)

## 2017-04-24 NOTE — DISCHARGE SUMMARY
Outcome: Successful outpatient ophthalmic surgical procedure  Preprinted Instructions given to patient.  Regular diet.  Activity: No restrictions  Meds: see Med Rec  Condition: stable  Follow up: 1 day with Dr Mendez  Disposition: Home  Diagnosis: s/p eye surgery

## 2017-04-25 ENCOUNTER — OFFICE VISIT (OUTPATIENT)
Dept: OPHTHALMOLOGY | Facility: CLINIC | Age: 67
End: 2017-04-25
Attending: OPHTHALMOLOGY
Payer: MEDICARE

## 2017-04-25 DIAGNOSIS — H25.12 NUCLEAR SCLEROTIC CATARACT OF LEFT EYE: ICD-10-CM

## 2017-04-25 DIAGNOSIS — Z98.890 POST-OPERATIVE STATE: Primary | ICD-10-CM

## 2017-04-25 PROCEDURE — 99024 POSTOP FOLLOW-UP VISIT: CPT | Mod: S$GLB,,, | Performed by: OPHTHALMOLOGY

## 2017-04-25 PROCEDURE — 99999 PR PBB SHADOW E&M-EST. PATIENT-LVL II: CPT | Mod: PBBFAC,,, | Performed by: OPHTHALMOLOGY

## 2017-04-25 NOTE — PROGRESS NOTES
HPI     Post-op Evaluation    Additional comments: POD 1 phaco OS           Comments   POD 1 Phaco w/IOL OS April 24, 2017    MEDS:    Pred/Gati/Nepaf TID OS    Patient states she is doing well with no complaints.       Last edited by Mariam Meyer on 4/25/2017  8:19 AM. (History)            Assessment /Plan     For exam results, see Encounter Report.    Post-operative state    Nuclear sclerotic cataract of left eye      Slit lamp exam:  L/L: nl  K: clear, wound sealed  AC: 1+ cell  Lens: IOL centered and stable    POD1 s/p Phaco/IOL  Appropriate precautions and post op medications reviewed.  Patient instructed to call or come in if symptoms of redness, decreased vision, or pain are experienced.

## 2017-04-25 NOTE — MR AVS SNAPSHOT
[facility]  [address]  [place] [address]  Phone: [phone]  Fax: [phone]                  Ines Pacheco   2017 8:45 AM   Office Visit    Descripción:  Female : 1950   Personal Médico:  Randee Mendez MD   Departamento:  [facility]           Razón de la lam     Post-op Evaluation           Diagnósticos de Esta Visita        Comentarios    Post-operative state    -  Primario     Nuclear sclerotic cataract of left eye                Lista de tareas           Citas próximas        Personal Médico Departamento Tfno del dpto    2017 8:45 AM Randee Mendez MD [facility] [phone]    2017 10:00 AM Jose Fang MD [facility] [phone]      Metas (5 Years of Data)     Ninguna      [facility] [place]     [facility] L[facility]nea de Enfermeras - Asistencia   Enfermeras registradas de [facility] pueden ayudarle a reservar baron lam, proveer educación para la david, asesoría clínica, y otros servicios de asesoramiento.   Llame para mary servicio gratuito a [phone].             Medicamentos           Mensaje sobre Medicamentos     Verificar los cambios y / o adiciones a jeong régimen de medicación son los mismos que discutir con jeong médico. Si cualquiera de estos cambios o adiciones son incorrectos, por favor notifique a jeong proveedor de atención médica.             Verifique que la siguiente lista de medicamentos es baron representación exacta de los medicamentos que está tomando actualmente. Si no hay ningunos reportados, la lista puede estar en telles. Si no es correcta, por favor póngase en contacto con jeong proveedor de atención médica. Lleve esta lista con usted en smitha de emergencia.           Medicamentos Actuales     amlodipine-benazepril 10-20mg (LOTREL) 10-20 mg per capsule Take 1 capsule by mouth once daily.    ammonium lactate 12 % Crea Apply to feet twice daily.    atorvastatin (LIPITOR) 10 MG  tablet Take 1 tablet (10 mg total) by mouth once daily.    hydrochlorothiazide (HYDRODIURIL) 12.5 MG Tab Take 12.5 mg by mouth once daily.    calcium carbonate-vit D3-min 600 mg calcium- 400 unit Tab Take 1 tablet by mouth 2 (two) times daily.           Información de referencia clínica           Alergias     A partir del:  2017        No Known Allergies      Vacunas     Administradas en la fecha de la visita:  2017        None      Language Assistance Services     ATTENTION: Language assistance services are available, free of charge. Please call [phone].      ATENCIÓN: Si habla español, tiene a jeong disposición servicios gratuitos de asistencia lingüística. Llame al [phone].     CHÚ Ý: N?u b?n nói Ti?ng Vi?t, có các d?ch v? h? tr? ngôn ng? mi?n phí dành cho b?n. G?i s? [phone].         [facility] cumple con las leyes federales aplicables de derechos civiles y no discrimina por motivos de katie, color, origen nacional, edad, discapacidad, o sexo.                 Ines Pacheco   2017 8:45 AM   Office Visit    Description:  Female : 1950   Provider:  Randee Mendez MD   Department:  [facility]           Reason for Visit     Post-op Evaluation           Diagnoses this Visit        Comments    Post-operative state    -  Primary     Nuclear sclerotic cataract of left eye                To Do List           Future Appointments        Provider Department Dept Phone    2017 8:45 AM Randee Mendez MD [facility] [phone]    2017 10:00 AM Jose Fang MD [facility] [phone]      Goals     None      [facility]     [facility] Care Line - 24/7 Assistance  Unless otherwise directed by your provider, please contact [facility], our nurse care line that is available for  assistance.     Registered nurses in the [facility] Center provide: appointment scheduling, clinical  advisement, health education, and other advisory services.  Call: [phone] (toll free)               Medications           Message regarding Medications     Verify the changes and/or additions to your medication regime listed below are the same as discussed with your clinician today.  If any of these changes or additions are incorrect, please notify your healthcare provider.             Verify that the below list of medications is an accurate representation of the medications you are currently taking.  If none reported, the list may be blank. If incorrect, please contact your healthcare provider. Carry this list with you in case of emergency.           Current Medications     amlodipine-benazepril 10-20mg (LOTREL) 10-20 mg per capsule Take 1 capsule by mouth once daily.    ammonium lactate 12 % Crea Apply to feet twice daily.    atorvastatin (LIPITOR) 10 MG tablet Take 1 tablet (10 mg total) by mouth once daily.    hydrochlorothiazide (HYDRODIURIL) 12.5 MG Tab Take 12.5 mg by mouth once daily.    calcium carbonate-vit D3-min 600 mg calcium- 400 unit Tab Take 1 tablet by mouth 2 (two) times daily.           Clinical Reference Information           Allergies as of 4/25/2017     No Known Allergies      Immunizations Administered on Date of Encounter - 4/25/2017     None      Language Assistance Services     ATTENTION: Language assistance services are available, free of charge. Please call [phone].      ATENCIÓN: Si pranav merida, tiene a jeong disposición servicios gratuitos de asistencia lingüística. Llame al [phone].     [facility] Ý: N?u b?n nói Ti?ng Vi?t, có các d?ch v? h? tr? ngôn ng? mi?n phí dành cho b?n. G?i s? [phone].         [facility] complies with applicable Federal civil rights laws and does not discriminate on the basis of race, color, national origin, age, disability, or sex.

## 2017-05-08 ENCOUNTER — OFFICE VISIT (OUTPATIENT)
Dept: FAMILY MEDICINE | Facility: CLINIC | Age: 67
End: 2017-05-08
Payer: MEDICARE

## 2017-05-08 ENCOUNTER — LAB VISIT (OUTPATIENT)
Dept: LAB | Facility: HOSPITAL | Age: 67
End: 2017-05-08
Attending: FAMILY MEDICINE
Payer: MEDICARE

## 2017-05-08 VITALS
SYSTOLIC BLOOD PRESSURE: 108 MMHG | BODY MASS INDEX: 26.61 KG/M2 | WEIGHT: 135.56 LBS | HEART RATE: 75 BPM | DIASTOLIC BLOOD PRESSURE: 62 MMHG | HEIGHT: 60 IN | OXYGEN SATURATION: 98 %

## 2017-05-08 DIAGNOSIS — E78.5 DYSLIPIDEMIA: ICD-10-CM

## 2017-05-08 DIAGNOSIS — Z78.0 ASYMPTOMATIC MENOPAUSE: ICD-10-CM

## 2017-05-08 DIAGNOSIS — E78.00 PURE HYPERCHOLESTEROLEMIA: ICD-10-CM

## 2017-05-08 DIAGNOSIS — F33.0 MILD EPISODE OF RECURRENT MAJOR DEPRESSIVE DISORDER: ICD-10-CM

## 2017-05-08 DIAGNOSIS — R25.2 CRAMP OF BOTH LOWER EXTREMITIES: ICD-10-CM

## 2017-05-08 DIAGNOSIS — Z12.31 ENCOUNTER FOR SCREENING MAMMOGRAM FOR MALIGNANT NEOPLASM OF BREAST: ICD-10-CM

## 2017-05-08 DIAGNOSIS — I10 ESSENTIAL HYPERTENSION: Primary | ICD-10-CM

## 2017-05-08 DIAGNOSIS — Z23 NEED FOR DIPHTHERIA-TETANUS-PERTUSSIS (TDAP) VACCINE: ICD-10-CM

## 2017-05-08 DIAGNOSIS — I10 ESSENTIAL HYPERTENSION: ICD-10-CM

## 2017-05-08 DIAGNOSIS — Z12.11 SCREEN FOR COLON CANCER: ICD-10-CM

## 2017-05-08 LAB
ALBUMIN SERPL BCP-MCNC: 4.3 G/DL
ALP SERPL-CCNC: 131 U/L
ALT SERPL W/O P-5'-P-CCNC: 13 U/L
ANION GAP SERPL CALC-SCNC: 12 MMOL/L
AST SERPL-CCNC: 17 U/L
BASOPHILS # BLD AUTO: 0.02 K/UL
BASOPHILS NFR BLD: 0.4 %
BILIRUB SERPL-MCNC: 1.1 MG/DL
BILIRUB UR QL STRIP: NEGATIVE
BUN SERPL-MCNC: 9 MG/DL
CALCIUM SERPL-MCNC: 9.6 MG/DL
CHLORIDE SERPL-SCNC: 105 MMOL/L
CHOLEST/HDLC SERPL: 4.3 {RATIO}
CK SERPL-CCNC: 62 U/L
CLARITY UR REFRACT.AUTO: ABNORMAL
CO2 SERPL-SCNC: 27 MMOL/L
COLOR UR AUTO: YELLOW
CREAT SERPL-MCNC: 0.7 MG/DL
DIFFERENTIAL METHOD: NORMAL
EOSINOPHIL # BLD AUTO: 0.1 K/UL
EOSINOPHIL NFR BLD: 1.5 %
ERYTHROCYTE [DISTWIDTH] IN BLOOD BY AUTOMATED COUNT: 13.7 %
EST. GFR  (AFRICAN AMERICAN): >60 ML/MIN/1.73 M^2
EST. GFR  (NON AFRICAN AMERICAN): >60 ML/MIN/1.73 M^2
GLUCOSE SERPL-MCNC: 98 MG/DL
GLUCOSE UR QL STRIP: NEGATIVE
HCT VFR BLD AUTO: 40.6 %
HDL/CHOLESTEROL RATIO: 23.1 %
HDLC SERPL-MCNC: 234 MG/DL
HDLC SERPL-MCNC: 54 MG/DL
HGB BLD-MCNC: 13.3 G/DL
HGB UR QL STRIP: NEGATIVE
KETONES UR QL STRIP: NEGATIVE
LDLC SERPL CALC-MCNC: 158 MG/DL
LEUKOCYTE ESTERASE UR QL STRIP: NEGATIVE
LYMPHOCYTES # BLD AUTO: 2.1 K/UL
LYMPHOCYTES NFR BLD: 40.2 %
MAGNESIUM SERPL-MCNC: 2.3 MG/DL
MCH RBC QN AUTO: 29.4 PG
MCHC RBC AUTO-ENTMCNC: 32.8 %
MCV RBC AUTO: 90 FL
MICROSCOPIC COMMENT: NORMAL
MONOCYTES # BLD AUTO: 0.3 K/UL
MONOCYTES NFR BLD: 5.9 %
NEUTROPHILS # BLD AUTO: 2.7 K/UL
NEUTROPHILS NFR BLD: 51.8 %
NITRITE UR QL STRIP: NEGATIVE
NONHDLC SERPL-MCNC: 180 MG/DL
PH UR STRIP: 8 [PH] (ref 5–8)
PHOSPHATE SERPL-MCNC: 3.5 MG/DL
PLATELET # BLD AUTO: 228 K/UL
PMV BLD AUTO: 11.2 FL
POTASSIUM SERPL-SCNC: 4.2 MMOL/L
PROT SERPL-MCNC: 7.2 G/DL
PROT UR QL STRIP: NEGATIVE
RBC # BLD AUTO: 4.53 M/UL
SODIUM SERPL-SCNC: 144 MMOL/L
SP GR UR STRIP: 1.01 (ref 1–1.03)
TRIGL SERPL-MCNC: 110 MG/DL
TSH SERPL DL<=0.005 MIU/L-ACNC: 1.7 UIU/ML
URN SPEC COLLECT METH UR: ABNORMAL
UROBILINOGEN UR STRIP-ACNC: NEGATIVE EU/DL
WBC # BLD AUTO: 5.27 K/UL
WBC #/AREA URNS AUTO: 1 /HPF (ref 0–5)

## 2017-05-08 PROCEDURE — 3078F DIAST BP <80 MM HG: CPT | Mod: S$GLB,,, | Performed by: FAMILY MEDICINE

## 2017-05-08 PROCEDURE — 99499 UNLISTED E&M SERVICE: CPT | Mod: S$GLB,,, | Performed by: FAMILY MEDICINE

## 2017-05-08 PROCEDURE — 85025 COMPLETE CBC W/AUTO DIFF WBC: CPT

## 2017-05-08 PROCEDURE — 99214 OFFICE O/P EST MOD 30 MIN: CPT | Mod: S$GLB,,, | Performed by: FAMILY MEDICINE

## 2017-05-08 PROCEDURE — 80053 COMPREHEN METABOLIC PANEL: CPT

## 2017-05-08 PROCEDURE — 84100 ASSAY OF PHOSPHORUS: CPT

## 2017-05-08 PROCEDURE — 1160F RVW MEDS BY RX/DR IN RCRD: CPT | Mod: S$GLB,,, | Performed by: FAMILY MEDICINE

## 2017-05-08 PROCEDURE — 1126F AMNT PAIN NOTED NONE PRSNT: CPT | Mod: S$GLB,,, | Performed by: FAMILY MEDICINE

## 2017-05-08 PROCEDURE — 1159F MED LIST DOCD IN RCRD: CPT | Mod: S$GLB,,, | Performed by: FAMILY MEDICINE

## 2017-05-08 PROCEDURE — 3074F SYST BP LT 130 MM HG: CPT | Mod: S$GLB,,, | Performed by: FAMILY MEDICINE

## 2017-05-08 PROCEDURE — 36415 COLL VENOUS BLD VENIPUNCTURE: CPT | Mod: PO

## 2017-05-08 PROCEDURE — 83735 ASSAY OF MAGNESIUM: CPT

## 2017-05-08 PROCEDURE — 82550 ASSAY OF CK (CPK): CPT

## 2017-05-08 PROCEDURE — 80061 LIPID PANEL: CPT

## 2017-05-08 PROCEDURE — 99999 PR PBB SHADOW E&M-EST. PATIENT-LVL III: CPT | Mod: PBBFAC,,, | Performed by: FAMILY MEDICINE

## 2017-05-08 PROCEDURE — 84443 ASSAY THYROID STIM HORMONE: CPT

## 2017-05-08 RX ORDER — ATORVASTATIN CALCIUM 10 MG/1
10 TABLET, FILM COATED ORAL DAILY
Qty: 90 TABLET | Refills: 3 | Status: SHIPPED | OUTPATIENT
Start: 2017-05-08 | End: 2017-10-09 | Stop reason: SDUPTHER

## 2017-05-08 RX ORDER — LANOLIN ALCOHOL/MO/W.PET/CERES
400 CREAM (GRAM) TOPICAL NIGHTLY
Qty: 10 TABLET | Refills: 0 | Status: SHIPPED | OUTPATIENT
Start: 2017-05-08 | End: 2017-05-18

## 2017-05-08 RX ORDER — AMLODIPINE AND BENAZEPRIL HYDROCHLORIDE 10; 20 MG/1; MG/1
1 CAPSULE ORAL DAILY
Qty: 90 CAPSULE | Refills: 3 | Status: SHIPPED | OUTPATIENT
Start: 2017-05-08 | End: 2017-10-09 | Stop reason: SDUPTHER

## 2017-05-08 NOTE — PATIENT INSTRUCTIONS
Los Calambres En Las Piernas [Leg Cramps]  Un calambre o espasmo muscular es baron mar contracción de las fibras de un músculo. Anastasia problema puede presentarse en los pies, las pantorrillas o los muslos por la noche, cuando las piernas están elevadas. Si es muy prolongado, el espasmo puede volverse muy doloroso.  Los calambres pueden ocurrir por varias causas, vladimir dormir en baron posición incómoda, un ingris muscular deficiente por falta de ejercicio y estiramientos, deshidratación, desequilibrio de electrolitos, diabetes, consumo de alcohol y ciertos medicamentos.  Cuidados En [place]:  1. Kathe abundantes líquidos stuart el día, para prevenir la deshidratación.  2. Estire las piernas antes de acostarse.  3. Lleve baron dieta con alimentos ricos en potasio, tales vladimir la fruta fresca (bananas, naranjas, melón anaranjado y boris) y jugos de fruta (manzana, ciruelas, naranja, uva y starr). Otros de los alimentos más ricos en potasio son los frijoles blancos, rojos y pintos, las josette horneadas, las espinacas crudas y ciertos productos de mar vladimir el bacalao, la platija (flounder), el hipogloso (halibut), el salmón y las vieiras (scallops).  4. Consulte con jeong médico sobre la posibilidad de david suplementos minerales y vitamínicos que contengan magnesio y vitamina B-12, si aún no los está tomando. También podrían recetarle otros medicamentos.  Primeros Auxilios En Naina De Un Calambre Sofia En La Pierna   · Para calmar un dolor leve, pruebe a levantarse de la cama y caminar. Algunas personas sienten alivio con la aplicación de calor (ducha o baño caliente, almohadilla calefactora) y haciéndose masajes. Otras personas se sienten mejor aplicándose baron compresa fría (hielo triturado o en cubitos dentro de baron bolsa plástica envuelta en baron toalla). Pruebe ambos métodos y use el que le dé mejores resultados stuart 20 minutos a la vez.  · Si el dolor es intenso, estirar el músculo que tiene el espasmo podría aliviarlo  rápidamente.  · Si el espasmo le afecta el pie, es posible que se le flexionen los dedos hacia arriba o hacia abajo. Para estirar el músculo que tiene el espasmo, doble los dedos del pie en la dirección opuesta: si el espasmo shukri de los dedos hacia arriba, dóblelos hacia abajo; si el espasmo suhkri de los dedos hacia abajo, dóblelos hacia arriba.  · Si el espasmo le afecta la pantorrilla, flexione el tobillo de modo que el pie apunte hacia arriba en dirección de la rodilla.  · Si el espasmo le afecta el muslo, flexione o enderece la rodilla y la cadera hasta sentir alivio.  · Sostenga el estiramiento stuart 30 segundos, relájese y descanse por un minuto; repita mary proceso hasta que se le alivie el espasmo.  Brenda baron visita de control a jeong médico o amry centro si no ha empezado a mejorar en la próxima semana. Si el dolor le empeora cuando camina y le mejora cuando descansa, consulte con jeong médico para que le brenda otras pruebas, ya que esto podría ser señal de baron karen circulación en la pierna.  Regrese Sin Demora  o llame al médico en cualquiera de los siguientes casos:  · Tiene hinchazón, frío, coloración azulada, entumecimiento u hormigueo en los dedos de las tabatha o de los pies  · Se le debilita la pierna afectada  · El dolor aumenta y no se puede controlar con las medidas descritas arriba  Date Last Reviewed: 11/23/2015  © 2049-6100 [employer]. [address] [place] [address], [place] [address]. Todos los derechos reservados. Esta información no pretende sustituir la atención médica profesional. Sólo jeong médico puede diagnosticar y tratar un problema de david.

## 2017-05-08 NOTE — MR AVS SNAPSHOT
[facility]   [place]  [place] [address]  Phone: [phone]  Fax: [phone]                  Ines Pacheco   2017 10:00 AM   Office Visit    Descripción:  Female : 1950   Personal Médico:  Jose Fang MD   Departamento:  [facility]           Razón de la lam     Follow-up     Hypertension     Hyperlipidemia           Diagnósticos de Esta Visita        Comentarios    Essential hypertension    -  Primario     Need for diphtheria-tetanus-pertussis (Tdap) vaccine         Dyslipidemia         Cramp of both lower extremities         Encounter for screening mammogram for malignant neoplasm of breast         Asymptomatic menopause         Mild episode of recurrent major depressive disorder         Screen for colon cancer         Pure hypercholesterolemia                Lista de tareas           Citas próximas        Personal Médico Departamento Tfno del dpto    2017 11:30 AM OLI KENNER [facility] [phone]    2017 8:00 AM METH MAMMO1 [facility] [phone]    2017 8:30 AM METC, DEXA1 [facility] [phone]    2017 8:00 AM Yasmine Rubin, OD [facility] - Optometry [phone]      Metas (5 Years of Data)     Ninguna      Follow-Up and Disposition     Return in about 6 months (around 2017), or if symptoms worsen or fail to improve.    Follow-up and Disposition History      Recetas para recoger        Disp Refills Start End    DIPH,PERTUSS,ACEL,,TET VAC,PF,, ADULT, (ADACEL) 2 Lf-(2.5-5-3-5 mcg)-5Lf/0.5 mL injection 0.5 mL 0 2017    Inject 0.5 mLs into the muscle once. - Intramuscular    [facility]: [id number] - [place] - [address] [facility] AT [facility] OF [address] No. de tlfo: #: [phone]       magnesium oxide (MAG-OX) 400 mg tablet 10 tablet 0 2017    Take 1 tablet (400 mg total) by mouth every  evening. - Oral    [facility]: [facility] [address] - [place] [id number] [facility] AT [facility] & [facility] No. de tlfo: #: [phone]       amlodipine-benazepril 10-20mg (LOTREL) 10-20 mg per capsule 90 capsule 3 5/8/2017 5/8/2018    Take 1 capsule by mouth once daily. - Oral    [facility]: [facility] - [address] AT [facility] & [facility] No. de tlfo: #: [phone]       atorvastatin (LIPITOR) 10 MG tablet 90 tablet 3 5/8/2017     Take 1 tablet (10 mg total) by mouth once daily. - Oral    [facility]: [facility] [address] - [place], [address] AT [facility] & [facility] No. de tlfo: #: [phone]         [facility] en Llamada     [facility] Enfermeras - Asistencia 24/7  Enfermeras registradas de [facility] pueden ayudarle a reservar baron lam, proveer educación para la david, asesoría clínica, y otros servicios de asesoramiento.   Llame para mary servicio gratuito a [phone].             Medicamentos           Mensaje sobre Medicamentos     Verificar los cambios y / o adiciones a joeng régimen de medicación son los mismos que discutir con jeong médico. Si cualquiera de estos cambios o adiciones son incorrectos, por favor notifique a jeong proveedor de atención médica.        EMPEZAR a david estos medicamentos NUEVOS        Refills    DIPH,PERTUSS,ACEL,,TET VAC,PF,, ADULT, (ADACEL) 2 Lf-(2.5-5-3-5 mcg)-5Lf/0.5 mL injection 0    Sig: Inject 0.5 mLs into the muscle once.    Categoría: Print    Vía: Intramuscular    magnesium oxide (MAG-OX) 400 mg tablet 0    Sig: Take 1 tablet (400 mg total) by mouth every evening.    Categoría: Normal    Vía: Oral      DEJAR de david estos medicamentos     ammonium lactate 12 % Crea Apply to feet twice daily.    hydrochlorothiazide (HYDRODIURIL) 12.5 MG Tab Take 12.5 mg by mouth once daily.           Verifique que la siguiente lista de medicamentos  es baron representación exacta de los medicamentos que está tomando actualmente. Si no hay ningunos reportados, la lista puede estar en telles. Si no es correcta, por favor póngase en contacto con jeong proveedor de atención médica. Lleve esta lista con usted en smitha de emergencia.           Medicamentos Actuales     amlodipine-benazepril 10-20mg (LOTREL) 10-20 mg per capsule Take 1 capsule by mouth once daily.    atorvastatin (LIPITOR) 10 MG tablet Take 1 tablet (10 mg total) by mouth once daily.    calcium carbonate-vit D3-min 600 mg calcium- 400 unit Tab Take 1 tablet by mouth 2 (two) times daily.    DIPH,PERTUSS,ACEL,,TET VAC,PF,, ADULT, (ADACEL) 2 Lf-(2.5-5-3-5 mcg)-5Lf/0.5 mL injection Inject 0.5 mLs into the muscle once.    magnesium oxide (MAG-OX) 400 mg tablet Take 1 tablet (400 mg total) by mouth every evening.           Información de referencia clínica           Pauly signos vitales heather     PS [place] Peso SpO2 BMI (IMC)    108/62 (BP Location: Left arm, Patient Position: Sitting, BP Method: Manual) 75 5' (1.524 m) 61.5 kg (135 lb 9.3 oz) 98% 26.48 kg/m2      Blood Pressure          Most Recent Value    BP  108/62      Alergias     A partir del:  5/8/2017        No Known Allergies      Vacunas     Administradas en la fecha de la visita:  5/8/2017        None      Orders Placed During Today's Visit      Órdenes normales de esta visita    Urinalysis     Exámenes/Procedimientos futuros Se espera el Vence    CBC auto differential  5/8/2017 5/8/2018    CK  5/8/2017 8/6/2017    Comprehensive metabolic panel  5/8/2017 8/6/2017    DXA Bone Density Spine And Hip  5/8/2017 8/6/2017    Lipid panel  5/8/2017 8/6/2017    Magnesium  5/8/2017 8/6/2017    Mammo Digital Screening Bilat with CAD  5/8/2017 8/6/2017    PHOSPHORUS  5/8/2017 7/7/2018    TSH  5/8/2017 8/6/2017      Instrucciones      Los Calambres En Las Piernas [Leg Cramps]  Un calambre o espasmo muscular es baron mar contracción de las fibras de un músculo.  Anastasia problema puede presentarse en los pies, las pantorrillas o los muslos por la noche, cuando las piernas están elevadas. Si es muy prolongado, el espasmo puede volverse muy doloroso.  Los calambres pueden ocurrir por varias causas, vladimir dormir en baron posición incómoda, un ingris muscular deficiente por falta de ejercicio y estiramientos, deshidratación, desequilibrio de electrolitos, diabetes, consumo de alcohol y ciertos medicamentos.  Cuidados En [place]:  1. Kathe abundantes líquidos stuart el día, para prevenir la deshidratación.  2. Estire las piernas antes de acostarse.  3. Lleve baron dieta con alimentos ricos en potasio, tales vladimir la fruta fresca (bananas, naranjas, melón anaranjado y boris) y jugos de fruta (manzana, ciruelas, naranja, uva y starr). Otros de los alimentos más ricos en potasio son los frijoles blancos, rojos y pintos, las josette horneadas, las espinacas crudas y ciertos productos de mar vladimir el bacalao, la platija (flounder), el hipogloso (halibut), el salmón y las vieiras (scallops).  4. Consulte con jeong médico sobre la posibilidad de david suplementos minerales y vitamínicos que contengan magnesio y vitamina B-12, si aún no los está tomando. También podrían recetarle otros medicamentos.  Primeros Auxilios En Naina De Un Calambre Sofia En La Pierna   · Para calmar un dolor leve, pruebe a levantarse de la cama y caminar. Algunas personas sienten alivio con la aplicación de calor (ducha o baño caliente, almohadilla calefactora) y haciéndose masajes. Otras personas se sienten mejor aplicándose baron compresa fría (hielo triturado o en cubitos dentro de baron bolsa plástica envuelta en baron toalla). Pruebe ambos métodos y use el que le dé mejores resultados stuart 20 minutos a la vez.  · Si el dolor es intenso, estirar el músculo que tiene el espasmo podría aliviarlo rápidamente.  · Si el espasmo le afecta el pie, es posible que se le flexionen los dedos hacia arriba o hacia abajo. Para estirar el  músculo que tiene el espasmo, doble los dedos del pie en la dirección opuesta: si el espasmo shukri de los dedos hacia arriba, dóblelos hacia abajo; si el espasmo shukri de los dedos hacia abajo, dóblelos hacia arriba.  · Si el espasmo le afecta la pantorrilla, flexione el tobillo de modo que el pie apunte hacia arriba en dirección de la rodilla.  · Si el espasmo le afecta el muslo, flexione o enderece la rodilla y la cadera hasta sentir alivio.  · Sostenga el estiramiento stuart 30 segundos, relájese y descanse por un minuto; repita mary proceso hasta que se le alivie el espasmo.  Brenda baron visita de control a jeong médico o mary centro si no ha empezado a mejorar en la próxima semana. Si el dolor le empeora cuando camina y le mejora cuando descansa, consulte con jeong médico para que le brenda otras pruebas, ya que esto podría ser señal de baorn karen circulación en la pierna.  Regrese Sin Demora  o llame al médico en cualquiera de los siguientes casos:  · Tiene hinchazón, frío, coloración azulada, entumecimiento u hormigueo en los dedos de las tabatha o de los pies  · Se le debilita la pierna afectada  · El dolor aumenta y no se puede controlar con las medidas descritas arriba  Date Last Reviewed: 11/23/2015  © 5714-6829 [employer]. [address] [place] [address], [place] [address]. Todos los derechos reservados. Esta información no pretende sustituir la atención médica profesional. Sólo jeong médico puede diagnosticar y tratar un problema de david.             Language Assistance Services     ATTENTION: Language assistance services are available, free of charge. Please call [phone].      ATENCIÓN: Si habla español, tiene a jeong disposición servicios gratuitos de asistencia lingüística. Llame al [phone].     CHÚ Ý: N?u b?n nói Ti?ng Vi?t, có các d?ch v? h? tr? ngôn ng? mi?n phí dành cho b?n. G?i s? [phone].         [facility] cumple con las leyes federales aplicables de derechos civiles y  no discrimina por motivos de katie, color, origen nacional, edad, discapacidad, o sexo.                 Ines Pacheco   2017 10:00 AM   Office Visit    Description:  Female : 1950   Provider:  Jose Fang MD   Department:  [facility]           Reason for Visit     Follow-up     Hypertension     Hyperlipidemia           Diagnoses this Visit        Comments    Essential hypertension    -  Primary     Need for diphtheria-tetanus-pertussis (Tdap) vaccine         Dyslipidemia         Cramp of both lower extremities         Encounter for screening mammogram for malignant neoplasm of breast         Asymptomatic menopause         Mild episode of recurrent major depressive disorder         Screen for colon cancer         Pure hypercholesterolemia                To Do List           Future Appointments        Provider Department Dept Phone    2017 11:30 AM LAB, KENNER [facility] [phone]    2017 8:00 AM METH MAMMO1 [facility] [phone]    2017 8:30 AM METC, DEXA1 [facility] [phone]    2017 8:00 AM Yasmine Rubin, OD [facility] - Optometry [phone]      Goals     None      Follow-Up and Disposition     Return in about 6 months (around 2017), or if symptoms worsen or fail to improve.    Follow-up and Disposition History       These Medications        Disp Refills Start End    DIPH,PERTUSS,ACEL,,TET VAC,PF,, ADULT, (ADACEL) 2 Lf-(2.5-5-3-5 mcg)-5Lf/0.5 mL injection 0.5 mL 0 2017    Inject 0.5 mLs into the muscle once. - Intramuscular    Pharmacy: [facility] [id number] - [place]  [address] [facility] AT [facility] OF [address] Ph #: [phone]       magnesium oxide (MAG-OX) 400 mg tablet 10 tablet 0 2017    Take 1 tablet (400 mg total) by mouth every evening. - Oral    Pharmacy: [facility] [id number] - [place] - [address]  [facility] Ph #: [phone]       amlodipine-benazepril 10-20mg (LOTREL) 10-20 mg per capsule 90 capsule 3 5/8/2017 5/8/2018    Take 1 capsule by mouth once daily. - Oral    Pharmacy: [facility] [address] Ph #: [phone]       atorvastatin (LIPITOR) 10 MG tablet 90 tablet 3 5/8/2017     Take 1 tablet (10 mg total) by mouth once daily. - Oral    Pharmacy: [facility] [address] [address] Ph #: [phone]         [facility]     [facility] Nurse Care Line - 24/7 Assistance  Unless otherwise directed by your provider, please contact [facility], our nurse care line that is available for 24/7 assistance.     Registered nurses in the [facility] provide: appointment scheduling, clinical advisement, health education, and other advisory services.  Call: [phone] (toll free)               Medications           Message regarding Medications     Verify the changes and/or additions to your medication regime listed below are the same as discussed with your clinician today.  If any of these changes or additions are incorrect, please notify your healthcare provider.        START taking these NEW medications        Refills    DIPH,PERTUSS,ACEL,,TET VAC,PF,, ADULT, (ADACEL) 2 Lf-(2.5-5-3-5 mcg)-5Lf/0.5 mL injection 0    Sig: Inject 0.5 mLs into the muscle once.    Class: Print    Route: Intramuscular    magnesium oxide (MAG-OX) 400 mg tablet 0    Sig: Take 1 tablet (400 mg total) by mouth every evening.    Class: Normal    Route: Oral      STOP taking these medications     ammonium lactate 12 % Crea Apply to feet twice daily.    hydrochlorothiazide (HYDRODIURIL) 12.5 MG Tab Take 12.5 mg by mouth once daily.           Verify that the below list of medications is an accurate  representation of the medications you are currently taking.  If none reported, the list may be blank. If incorrect, please contact your healthcare provider. Carry this list with you in case of emergency.           Current Medications     amlodipine-benazepril 10-20mg (LOTREL) 10-20 mg per capsule Take 1 capsule by mouth once daily.    atorvastatin (LIPITOR) 10 MG tablet Take 1 tablet (10 mg total) by mouth once daily.    calcium carbonate-vit D3-min 600 mg calcium- 400 unit Tab Take 1 tablet by mouth 2 (two) times daily.    DIPH,PERTUSS,ACEL,,TET VAC,PF,, ADULT, (ADACEL) 2 Lf-(2.5-5-3-5 mcg)-5Lf/0.5 mL injection Inject 0.5 mLs into the muscle once.    magnesium oxide (MAG-OX) 400 mg tablet Take 1 tablet (400 mg total) by mouth every evening.           Clinical Reference Information           Your Vitals Were     BP Pulse Height Weight SpO2 BMI    108/62 (BP Location: Left arm, Patient Position: Sitting, BP Method: Manual) 75 5' (1.524 m) 61.5 kg (135 lb 9.3 oz) 98% 26.48 kg/m2      Blood Pressure          Most Recent Value    BP  108/62      Allergies as of 5/8/2017     No Known Allergies      Immunizations Administered on Date of Encounter - 5/8/2017     None      Orders Placed During Today's Visit      Normal Orders This Visit    Urinalysis     Future Labs/Procedures Expected by Expires    CBC auto differential  5/8/2017 5/8/2018    CK  5/8/2017 8/6/2017    Comprehensive metabolic panel  5/8/2017 8/6/2017    DXA Bone Density Spine And Hip  5/8/2017 8/6/2017    Lipid panel  5/8/2017 8/6/2017    Magnesium  5/8/2017 8/6/2017    Mammo Digital Screening Bilat with CAD  5/8/2017 8/6/2017    PHOSPHORUS  5/8/2017 7/7/2018    TSH  5/8/2017 8/6/2017      Instructions      Los Calambres En Las Piernas [Leg Cramps]  Un calambre o espasmo muscular es baron mar contracción de las fibras de un músculo. Anastasia problema puede presentarse en los pies, las pantorrillas o los muslos por la noche, cuando las piernas están elevadas. Si  es muy prolongado, el espasmo puede volverse muy doloroso.  Los calambres pueden ocurrir por varias causas, vladimir dormir en baron posición incómoda, un ingris muscular deficiente por falta de ejercicio y estiramientos, deshidratación, desequilibrio de electrolitos, diabetes, consumo de alcohol y ciertos medicamentos.  Cuidados En [place]:  1. Kathe abundantes líquidos stuart el día, para prevenir la deshidratación.  2. Estire las piernas antes de acostarse.  3. Lleve baron dieta con alimentos ricos en potasio, tales vladimir la fruta fresca (bananas, naranjas, melón anaranjado y boris) y jugos de fruta (manzana, ciruelas, naranja, uva y starr). Otros de los alimentos más ricos en potasio son los frijoles blancos, rojos y pintos, las josette horneadas, las espinacas crudas y ciertos productos de mar vladimir el bacalao, la platija (flounder), el hipogloso (halibut), el salmón y las vieiras (scallops).  4. Consulte con jeong médico sobre la posibilidad de david suplementos minerales y vitamínicos que contengan magnesio y vitamina B-12, si aún no los está tomando. También podrían recetarle otros medicamentos.  Primeros Auxilios En Naina De Un Calambre Sofia En La Pierna   · Para calmar un dolor leve, pruebe a levantarse de la cama y caminar. Algunas personas sienten alivio con la aplicación de calor (ducha o baño caliente, almohadilla calefactora) y haciéndose masajes. Otras personas se sienten mejor aplicándose baron compresa fría (hielo triturado o en cubitos dentro de baron bolsa plástica envuelta en baron toalla). Pruebe ambos métodos y use el que le dé mejores resultados stuart 20 minutos a la vez.  · Si el dolor es intenso, estirar el músculo que tiene el espasmo podría aliviarlo rápidamente.  · Si el espasmo le afecta el pie, es posible que se le flexionen los dedos hacia arriba o hacia abajo. Para estirar el músculo que tiene el espasmo, doble los dedos del pie en la dirección opuesta: si el espasmo shukri de los dedos hacia arriba,  dóblelos hacia abajo; si el espasmo shukri de los dedos hacia abajo, dóblelos hacia arriba.  · Si el espasmo le afecta la pantorrilla, flexione el tobillo de modo que el pie apunte hacia arriba en dirección de la rodilla.  · Si el espasmo le afecta el muslo, flexione o enderece la rodilla y la cadera hasta sentir alivio.  · Sostenga el estiramiento stuart 30 segundos, relájese y descanse por un minuto; repita mary proceso hasta que se le alivie el espasmo.  Brenda baron visita de control a jeong médico o mary centro si no ha empezado a mejorar en la próxima semana. Si el dolor le empeora cuando camina y le mejora cuando descansa, consulte con jeong médico para que le brenda otras pruebas, ya que esto podría ser señal de baron karen circulación en la pierna.  Regrese Sin Demora  o llame al médico en cualquiera de los siguientes casos:  · Tiene hinchazón, frío, coloración azulada, entumecimiento u hormigueo en los dedos de las tabatha o de los pies  · Se le debilita la pierna afectada  · El dolor aumenta y no se puede controlar con las medidas descritas arriba  Date Last Reviewed: 11/23/2015  © 8926-8693 [employer]. [address] [place] [address], [place] [address]. Todos los derechos reservados. Esta información no pretende sustituir la atención médica profesional. Sólo jeong médico puede diagnosticar y tratar un problema de david.             Language Assistance Services     ATTENTION: Language assistance services are available, free of charge. Please call [phone].      ATENCIÓN: Si habla español, tiene a jeong disposición servicios gratuitos de asistencia lingüística. Samuel trimble [phone].     EUN Ý: N?u b?n nói Ti?ng Vi?t, có các d?ch v? h? tr? ngôn ng? mi?n phí dành cho b?n. G?i s? [phone].         [facility] complies with applicable Federal civil rights laws and does not discriminate on the basis of race, color, national origin, age, disability, or sex.

## 2017-05-08 NOTE — PROGRESS NOTES
Subjective:       Patient ID: Ines Pacheco is a 66 y.o. female.    Chief Complaint: Follow-up; Hypertension; and Hyperlipidemia    HPI Comments: 66 years old female who came to the clinic for blood pressure check.  Blood pressure today is stable.  No chest pain palpitations orthopnea or PND.  Patient did not want a colonoscopy because of fear of anesthesia.  Patient also with episodic leg cramps for the last couple of weeks.  Patient using Lipitor every other day.  Patient with stable mild depression.  No suicidal or homicidal ideations.    Hypertension   Pertinent negatives include no chest pain or palpitations.   Hyperlipidemia   Pertinent negatives include no chest pain.     Review of Systems   Constitutional: Negative.    HENT: Negative.    Eyes: Negative.    Respiratory: Negative.    Cardiovascular: Negative.  Negative for chest pain, palpitations and leg swelling.   Gastrointestinal: Negative.    Endocrine: Negative for cold intolerance, heat intolerance, polydipsia, polyphagia and polyuria.   Genitourinary: Negative.    Musculoskeletal: Negative.    Skin: Negative.    Neurological: Negative.    Psychiatric/Behavioral: Negative.        Objective:      Physical Exam   Constitutional: She is oriented to person, place, and time. She appears well-developed and well-nourished. No distress.   HENT:   Head: Normocephalic and atraumatic.   Right Ear: External ear normal.   Left Ear: External ear normal.   Nose: Nose normal.   Mouth/Throat: Oropharynx is clear and moist. No oropharyngeal exudate.   Eyes: Conjunctivae and EOM are normal. Pupils are equal, round, and reactive to light. Right eye exhibits no discharge. Left eye exhibits no discharge. No scleral icterus.   Neck: Normal range of motion. Neck supple. No JVD present. No tracheal deviation present. No thyromegaly present.   Cardiovascular: Normal rate, regular rhythm, normal heart sounds and intact distal pulses.  Exam reveals no gallop and no friction  rub.    No murmur heard.  Pulmonary/Chest: Effort normal and breath sounds normal. No stridor. No respiratory distress. She has no wheezes. She has no rales. She exhibits no tenderness.   Abdominal: Soft. Bowel sounds are normal. She exhibits no distension and no mass. There is no tenderness. There is no rebound and no guarding.   Musculoskeletal: Normal range of motion. She exhibits no edema or tenderness.   Lymphadenopathy:     She has no cervical adenopathy.   Neurological: She is alert and oriented to person, place, and time. She has normal reflexes. No cranial nerve deficit. She exhibits normal muscle tone. Coordination normal.   Skin: Skin is warm and dry. No rash noted. She is not diaphoretic. No erythema. No pallor.   Psychiatric: Her behavior is normal. Judgment and thought content normal. Her mood appears not anxious. Her affect is not angry, not blunt, not labile and not inappropriate. She exhibits a depressed mood.       Assessment:       1. Essential hypertension    2. Need for diphtheria-tetanus-pertussis (Tdap) vaccine    3. Dyslipidemia    4. Cramp of both lower extremities    5. Encounter for screening mammogram for malignant neoplasm of breast    6. Asymptomatic menopause    7. Mild episode of recurrent major depressive disorder    8. Screen for colon cancer        Plan:         Ines was seen today for follow-up, hypertension and hyperlipidemia.    Diagnoses and all orders for this visit:    Essential hypertension  -     Comprehensive metabolic panel; Future  -     Lipid panel; Future  -     Urinalysis  -     TSH; Future  -     CBC auto differential; Future  -     amlodipine-benazepril 10-20mg (LOTREL) 10-20 mg per capsule; Take 1 capsule by mouth once daily.    Need for diphtheria-tetanus-pertussis (Tdap) vaccine  -     DIPH,PERTUSS,ACEL,,TET VAC,PF,, ADULT, (ADACEL) 2 Lf-(2.5-5-3-5 mcg)-5Lf/0.5 mL injection; Inject 0.5 mLs into the muscle once.    Dyslipidemia  -     Comprehensive metabolic  panel; Future  -     Lipid panel; Future    Cramp of both lower extremities  -     Comprehensive metabolic panel; Future  -     Magnesium; Future  -     PHOSPHORUS; Future  -     CK; Future  -     magnesium oxide (MAG-OX) 400 mg tablet; Take 1 tablet (400 mg total) by mouth every evening.    Encounter for screening mammogram for malignant neoplasm of breast  -     Mammo Digital Screening Bilat with CAD; Future    Asymptomatic menopause  -     DXA Bone Density Spine And Hip; Future    Mild episode of recurrent major depressive disorder    Screen for colon cancer    Pure hypercholesterolemia  -     atorvastatin (LIPITOR) 10 MG tablet; Take 1 tablet (10 mg total) by mouth once daily.     patient did not want colonoscopy.  Patient stool for occult blood is not covered by her insurance.

## 2017-05-22 ENCOUNTER — HOSPITAL ENCOUNTER (OUTPATIENT)
Dept: RADIOLOGY | Facility: HOSPITAL | Age: 67
Discharge: HOME | End: 2017-05-22
Attending: FAMILY MEDICINE
Payer: MEDICARE

## 2017-05-22 DIAGNOSIS — Z12.31 ENCOUNTER FOR SCREENING MAMMOGRAM FOR MALIGNANT NEOPLASM OF BREAST: ICD-10-CM

## 2017-05-22 PROCEDURE — 77067 SCR MAMMO BI INCL CAD: CPT | Mod: 26,,, | Performed by: RADIOLOGY

## 2017-05-22 PROCEDURE — 77063 BREAST TOMOSYNTHESIS BI: CPT | Mod: 26,,, | Performed by: RADIOLOGY

## 2017-05-22 PROCEDURE — 77067 SCR MAMMO BI INCL CAD: CPT | Mod: TC

## 2017-05-26 ENCOUNTER — OFFICE VISIT (OUTPATIENT)
Dept: OPTOMETRY | Facility: CLINIC | Age: 67
End: 2017-05-26
Payer: MEDICARE

## 2017-05-26 DIAGNOSIS — Z98.41 S/P BILATERAL CATARACT EXTRACTION: Primary | ICD-10-CM

## 2017-05-26 DIAGNOSIS — Z98.42 S/P BILATERAL CATARACT EXTRACTION: Primary | ICD-10-CM

## 2017-05-26 PROCEDURE — 99024 POSTOP FOLLOW-UP VISIT: CPT | Mod: S$GLB,,, | Performed by: OPTOMETRIST

## 2017-05-26 PROCEDURE — 99999 PR PBB SHADOW E&M-EST. PATIENT-LVL II: CPT | Mod: PBBFAC,,, | Performed by: OPTOMETRIST

## 2017-05-26 NOTE — PROGRESS NOTES
HPI     Post-op Evaluation    Additional comments: ou           Comments   03/06/2017 IMPLANT: pcboo 24.0 OD Dr. Mendez  04/24/2017 IMPLANT: pcboo 24.5 OS Dr. Mendez    Patient's last dilated exam was: 7/27/2016  Pt states: doing well since sx, using gtts tid os, occasionally uses in   od. Patient denies  floaters, pain and double vision. Sees a flash in her   peripheral vision ou at night. Using her old rx glasses for reading only.   Eyes feel tired all the time            Last edited by Oxana Hayden, PCT on 5/26/2017  8:28 AM.   (History)        ROS     Negative for: Constitutional, Gastrointestinal, Neurological, Skin,   Genitourinary, Musculoskeletal, HENT, Endocrine, Cardiovascular, Eyes,   Respiratory, Psychiatric, Allergic/Imm, Heme/Lymph    Last edited by Yasmine Rubin, OD on 5/26/2017  8:41 AM. (History)        Assessment /Plan     For exam results, see Encounter Report.    S/P bilateral cataract extraction  -     OCT- Retina            1.  Pt doing well.  Reading rx given.  Eye health normal OU.  Return care to Dr. Arora.

## 2017-10-09 ENCOUNTER — OFFICE VISIT (OUTPATIENT)
Dept: FAMILY MEDICINE | Facility: CLINIC | Age: 67
End: 2017-10-09
Payer: MEDICARE

## 2017-10-09 ENCOUNTER — LAB VISIT (OUTPATIENT)
Dept: LAB | Facility: HOSPITAL | Age: 67
End: 2017-10-09
Attending: FAMILY MEDICINE
Payer: MEDICARE

## 2017-10-09 VITALS
DIASTOLIC BLOOD PRESSURE: 72 MMHG | BODY MASS INDEX: 26.92 KG/M2 | WEIGHT: 137.13 LBS | HEIGHT: 60 IN | SYSTOLIC BLOOD PRESSURE: 135 MMHG | TEMPERATURE: 98 F | HEART RATE: 72 BPM

## 2017-10-09 DIAGNOSIS — R53.83 FATIGUE, UNSPECIFIED TYPE: ICD-10-CM

## 2017-10-09 DIAGNOSIS — E78.00 PURE HYPERCHOLESTEROLEMIA: ICD-10-CM

## 2017-10-09 DIAGNOSIS — I10 ESSENTIAL HYPERTENSION: Primary | ICD-10-CM

## 2017-10-09 DIAGNOSIS — I95.1 POSTURAL HYPOTENSION: ICD-10-CM

## 2017-10-09 LAB
ALBUMIN SERPL BCP-MCNC: 4 G/DL
ALP SERPL-CCNC: 154 U/L
ALT SERPL W/O P-5'-P-CCNC: 17 U/L
ANION GAP SERPL CALC-SCNC: 9 MMOL/L
AST SERPL-CCNC: 18 U/L
BASOPHILS # BLD AUTO: 0.03 K/UL
BASOPHILS NFR BLD: 0.6 %
BILIRUB SERPL-MCNC: 1.1 MG/DL
BUN SERPL-MCNC: 8 MG/DL
CALCIUM SERPL-MCNC: 9.5 MG/DL
CHLORIDE SERPL-SCNC: 107 MMOL/L
CO2 SERPL-SCNC: 27 MMOL/L
CREAT SERPL-MCNC: 0.7 MG/DL
DIFFERENTIAL METHOD: NORMAL
EOSINOPHIL # BLD AUTO: 0.1 K/UL
EOSINOPHIL NFR BLD: 1.9 %
ERYTHROCYTE [DISTWIDTH] IN BLOOD BY AUTOMATED COUNT: 13.5 %
EST. GFR  (AFRICAN AMERICAN): >60 ML/MIN/1.73 M^2
EST. GFR  (NON AFRICAN AMERICAN): >60 ML/MIN/1.73 M^2
GLUCOSE SERPL-MCNC: 115 MG/DL
HCT VFR BLD AUTO: 40.6 %
HGB BLD-MCNC: 13.6 G/DL
LYMPHOCYTES # BLD AUTO: 2 K/UL
LYMPHOCYTES NFR BLD: 43.3 %
MCH RBC QN AUTO: 30.2 PG
MCHC RBC AUTO-ENTMCNC: 33.5 G/DL
MCV RBC AUTO: 90 FL
MONOCYTES # BLD AUTO: 0.4 K/UL
MONOCYTES NFR BLD: 9.4 %
NEUTROPHILS # BLD AUTO: 2.1 K/UL
NEUTROPHILS NFR BLD: 44.4 %
PLATELET # BLD AUTO: 240 K/UL
PMV BLD AUTO: 10.6 FL
POTASSIUM SERPL-SCNC: 4.3 MMOL/L
PROT SERPL-MCNC: 7 G/DL
RBC # BLD AUTO: 4.51 M/UL
SODIUM SERPL-SCNC: 143 MMOL/L
TSH SERPL DL<=0.005 MIU/L-ACNC: 1.74 UIU/ML
WBC # BLD AUTO: 4.66 K/UL

## 2017-10-09 PROCEDURE — 93005 ELECTROCARDIOGRAM TRACING: CPT | Mod: S$GLB,,, | Performed by: FAMILY MEDICINE

## 2017-10-09 PROCEDURE — 93010 ELECTROCARDIOGRAM REPORT: CPT | Mod: S$GLB,,, | Performed by: INTERNAL MEDICINE

## 2017-10-09 PROCEDURE — 99499 UNLISTED E&M SERVICE: CPT | Mod: S$GLB,,, | Performed by: FAMILY MEDICINE

## 2017-10-09 PROCEDURE — 84443 ASSAY THYROID STIM HORMONE: CPT

## 2017-10-09 PROCEDURE — 99999 PR PBB SHADOW E&M-EST. PATIENT-LVL III: CPT | Mod: PBBFAC,,, | Performed by: FAMILY MEDICINE

## 2017-10-09 PROCEDURE — 80053 COMPREHEN METABOLIC PANEL: CPT

## 2017-10-09 PROCEDURE — 85025 COMPLETE CBC W/AUTO DIFF WBC: CPT

## 2017-10-09 PROCEDURE — 36415 COLL VENOUS BLD VENIPUNCTURE: CPT | Mod: PO

## 2017-10-09 PROCEDURE — 99214 OFFICE O/P EST MOD 30 MIN: CPT | Mod: S$GLB,,, | Performed by: FAMILY MEDICINE

## 2017-10-09 RX ORDER — ATORVASTATIN CALCIUM 10 MG/1
10 TABLET, FILM COATED ORAL DAILY
Qty: 90 TABLET | Refills: 3 | Status: SHIPPED | OUTPATIENT
Start: 2017-10-09 | End: 2017-11-27

## 2017-10-09 RX ORDER — AMLODIPINE AND BENAZEPRIL HYDROCHLORIDE 10; 20 MG/1; MG/1
1 CAPSULE ORAL DAILY
Qty: 90 CAPSULE | Refills: 3 | Status: SHIPPED | OUTPATIENT
Start: 2017-10-09 | End: 2018-02-01 | Stop reason: SDUPTHER

## 2017-10-09 NOTE — PROGRESS NOTES
Subjective:       Patient ID: Ines Pacheco is a 67 y.o. female.    Chief Complaint: Medication Management    Disclaimer: This note has been generated using voice-recognition software. There may be typographical errors that have been missed during proof-reading    68 yo presents today for evaluation of 2 episodes of fatigue and ? Near syncope.  Symptoms have occurred over several weeks, and mainly when pt was outside in the heat.  Symptoms lasted a few minutes, not associated with loss of consciousness.  She denies chest pain, dyspnea or ankle edema.        Review of Systems   Constitutional: Negative for activity change, fatigue and unexpected weight change.   Respiratory: Negative for chest tightness and shortness of breath.    Cardiovascular: Negative for chest pain, palpitations and leg swelling.   Gastrointestinal: Negative for abdominal distention, abdominal pain and blood in stool.   Endocrine: Negative for polydipsia, polyphagia and polyuria.   Neurological: Positive for dizziness and light-headedness. Negative for syncope and weakness.       Objective:      Physical Exam   Constitutional: She appears well-developed and well-nourished. No distress.   HENT:   Right Ear: Tympanic membrane and ear canal normal.   Left Ear: Tympanic membrane and ear canal normal.   Mouth/Throat: Uvula is midline, oropharynx is clear and moist and mucous membranes are normal.   Neck: Normal range of motion. No JVD present. No thyromegaly present.   Cardiovascular: Normal rate and regular rhythm.  Exam reveals no friction rub.    No murmur heard.  Pulses:       Carotid pulses are 2+ on the right side, and 2+ on the left side.       Radial pulses are 2+ on the right side, and 2+ on the left side.        Dorsalis pedis pulses are 2+ on the right side, and 2+ on the left side.   Lymphadenopathy:     She has no cervical adenopathy.       Assessment:       1.  Near syncope episodes x 2   2.  Hypertension  3.   hyperlipidemia  Plan:       1.  EKG reviewed by me today is normal  2.  CBC, CMP, TSH  3.  Pt to enroll in Digital Medicine program for better monitoring of BP

## 2017-10-10 ENCOUNTER — PATIENT MESSAGE (OUTPATIENT)
Dept: FAMILY MEDICINE | Facility: CLINIC | Age: 67
End: 2017-10-10

## 2017-10-10 ENCOUNTER — TELEPHONE (OUTPATIENT)
Dept: FAMILY MEDICINE | Facility: CLINIC | Age: 67
End: 2017-10-10

## 2017-10-10 ENCOUNTER — TELEPHONE (OUTPATIENT)
Dept: CARDIOLOGY | Facility: CLINIC | Age: 67
End: 2017-10-10

## 2017-10-10 DIAGNOSIS — R73.9 HYPERGLYCEMIA: ICD-10-CM

## 2017-10-10 DIAGNOSIS — R79.89 ABNORMAL LFTS: Primary | ICD-10-CM

## 2017-10-10 NOTE — TELEPHONE ENCOUNTER
----- Message from Alisson Denney sent at 10/10/2017  8:04 AM CDT -----  Regarding: EKG ORDER  I see the order in epic but I can not release it. Could you release it, thanks.  Alisson [id number]

## 2017-10-10 NOTE — TELEPHONE ENCOUNTER
Patient notified regarding test results. Patient verbalizes that she would like to speak to Dr. Quiles.

## 2017-10-10 NOTE — TELEPHONE ENCOUNTER
I was unable to reach pt about test results; her liver tests are elevated, we need to set up an abdominal US, thanks

## 2017-10-10 NOTE — TELEPHONE ENCOUNTER
----- Message from Alisson Denney sent at 10/10/2017  3:09 PM CDT -----  Regarding: EKG ORDER  Please put in EKG order, thanks. Alisson [id number]

## 2017-10-16 ENCOUNTER — HOSPITAL ENCOUNTER (OUTPATIENT)
Dept: CARDIOLOGY | Facility: CLINIC | Age: 67
Discharge: HOME | End: 2017-10-16
Payer: MEDICARE

## 2017-10-16 DIAGNOSIS — I10 ESSENTIAL HYPERTENSION: ICD-10-CM

## 2017-10-16 PROCEDURE — 93000 ELECTROCARDIOGRAM COMPLETE: CPT | Mod: S$GLB,,, | Performed by: INTERNAL MEDICINE

## 2017-10-23 ENCOUNTER — PATIENT MESSAGE (OUTPATIENT)
Dept: ADMINISTRATIVE | Facility: OTHER | Age: 67
End: 2017-10-23

## 2017-10-23 ENCOUNTER — HOSPITAL ENCOUNTER (OUTPATIENT)
Dept: RADIOLOGY | Facility: HOSPITAL | Age: 67
Discharge: HOME | End: 2017-10-23
Attending: FAMILY MEDICINE
Payer: MEDICARE

## 2017-10-23 DIAGNOSIS — R79.89 ABNORMAL LFTS: ICD-10-CM

## 2017-10-23 PROCEDURE — 76700 US EXAM ABDOM COMPLETE: CPT | Mod: TC

## 2017-10-23 PROCEDURE — 76700 US EXAM ABDOM COMPLETE: CPT | Mod: 26,,, | Performed by: RADIOLOGY

## 2017-10-25 ENCOUNTER — TELEPHONE (OUTPATIENT)
Dept: FAMILY MEDICINE | Facility: CLINIC | Age: 67
End: 2017-10-25

## 2017-10-25 NOTE — TELEPHONE ENCOUNTER
Patient verbalizes that she read the results on [facility] but that she would like to speak to Dr. Quiles regarding her options when he returns next week.

## 2017-10-25 NOTE — TELEPHONE ENCOUNTER
----- Message from Zayra Monahna sent at 10/25/2017  2:29 PM CDT -----  Contact: Pt [phone]  Patient would like to get test results.  Name of test (lab, mammo, etc.):  Ultrasound  Date of test:  10/23  Ordering provider: Dr Quiles  [facility] the [facility]  Comments:

## 2017-11-27 ENCOUNTER — OFFICE VISIT (OUTPATIENT)
Dept: CARDIOLOGY | Facility: CLINIC | Age: 67
End: 2017-11-27
Payer: MEDICARE

## 2017-11-27 VITALS
SYSTOLIC BLOOD PRESSURE: 130 MMHG | WEIGHT: 136.81 LBS | HEART RATE: 84 BPM | DIASTOLIC BLOOD PRESSURE: 76 MMHG | HEIGHT: 61 IN | BODY MASS INDEX: 25.83 KG/M2

## 2017-11-27 DIAGNOSIS — E78.5 HYPERLIPIDEMIA, UNSPECIFIED HYPERLIPIDEMIA TYPE: ICD-10-CM

## 2017-11-27 DIAGNOSIS — I10 ESSENTIAL HYPERTENSION: Primary | ICD-10-CM

## 2017-11-27 PROCEDURE — 99213 OFFICE O/P EST LOW 20 MIN: CPT | Mod: S$GLB,,, | Performed by: INTERNAL MEDICINE

## 2017-11-27 PROCEDURE — 99999 PR PBB SHADOW E&M-EST. PATIENT-LVL III: CPT | Mod: PBBFAC,,, | Performed by: INTERNAL MEDICINE

## 2017-11-27 PROCEDURE — 99499 UNLISTED E&M SERVICE: CPT | Mod: S$GLB,,, | Performed by: INTERNAL MEDICINE

## 2017-11-27 NOTE — PROGRESS NOTES
Subjective:   Patient ID:  Ines Pacheco is a 67 y.o. female who presents for evaluation of Hypertension      HPI: Back for routine f/u.  Did not get set up with digital HTN.  Very brief palpitations once.  Very brief lightheadedness once without coming close to losing consciousness (this happened in [place]).  She then says she had an episode where she passed out in [place] at a  where it was very hot and she had not been eating or drinking.      No angina or new [place].  No significant palpitations.  No PND/orthopnea.    Oct 2016 HPI: Back for 3 month f/u.  Stopped taking HCTZ because it made her vomit.  BPs have been good at home but she did not yet enroll in digital HTN.     She denies chest discomfort, [place], palpitations, PND/orthopnea, lightheadedness and syncope.     Stress level is better though she still has some from stress from her son who is bed bound (he was born with hydrocephalus).     2016 HPI: Very pleasant woman who previously saw Dr. Arrieta. She's here because her BP has been higher than usual in the past couple of days. SBPs in the 150s-160s. She denies chest discomfort, [place], palpitations, PND/orthopnea, lightheadedness and syncope. She has a little stress in her life - concerns about her son and daughter     2014 HPI (Meenakshi): 63 y.o. female from \[facility]\"" with HTN and HLD who comes for follow up of hypertension and dyslipidemia. She last saw Dr. Escalante in 2013. She is doing well and denies syncope, lightheadedness, chest discomfort, shortness of breath (rest or dyspnea on exertion), palpitations, PND/orthopnea, or lower extremity swelling. She stopped her lipitor her PCP and her lipid values recently were worse. Her BP has been well controlled.     Past Medical History:   Diagnosis Date    Arthritis     Cataract     Hyperlipidemia     Hypertension        Past Surgical History:   Procedure Laterality Date    CATARACT EXTRACTION W/  INTRAOCULAR LENS IMPLANT Right  03/06/2017    Dr. Mendez    CATARACT EXTRACTION W/  INTRAOCULAR LENS IMPLANT Left 04/24/2017    Dr. Mendez    EYE SURGERY      HYSTERECTOMY         Social History   Substance Use Topics    Smoking status: Never Smoker    Smokeless tobacco: Never Used    Alcohol use No       Family History   Problem Relation Age of Onset    Heart attack Mother     Amblyopia Neg Hx     Blindness Neg Hx     Cataracts Neg Hx     Glaucoma Neg Hx     Macular degeneration Neg Hx     Retinal detachment Neg Hx     Strabismus Neg Hx        Current Outpatient Prescriptions   Medication Sig    amlodipine-benazepril 10-20mg (LOTREL) 10-20 mg per capsule Take 1 capsule by mouth once daily.    calcium carbonate-vit D3-min 600 mg calcium- 400 unit Tab Take 1 tablet by mouth 2 (two) times daily.     No current facility-administered medications for this visit.        Review of patient's allergies indicates:  No Known Allergies    Review of Systems   Constitution: Negative.   HENT: Negative.    Eyes: Negative.    Cardiovascular: Positive for palpitations and syncope (many months ago, as above). Negative for chest pain, dyspnea on exertion, near-syncope and orthopnea.   Respiratory: Negative.  Negative for cough, hemoptysis and shortness of breath.    Endocrine: Negative.    Hematologic/Lymphatic: Negative.    Skin: Negative.    Musculoskeletal: Negative.    Gastrointestinal: Negative.    Genitourinary: Negative.    Psychiatric/Behavioral: Negative.      Objective:   Physical Exam   Constitutional: She is oriented to person, place, and time. She appears well-developed and well-nourished.   HENT:   Head: Normocephalic and atraumatic.   Mouth/Throat: Oropharynx is clear and moist.   Eyes: Conjunctivae and EOM are normal. No scleral icterus.   Neck: Normal range of motion. Neck supple. No JVD present.   Cardiovascular: Normal rate, regular rhythm, normal heart sounds and intact distal pulses.  Exam reveals no gallop and no friction rub.    No  murmur heard.  Pulmonary/Chest: Effort normal and breath sounds normal. She has no wheezes. She has no rales.   Abdominal: Soft. Bowel sounds are normal. She exhibits no distension. There is no tenderness.   Musculoskeletal: Normal range of motion. She exhibits no edema.   Neurological: She is alert and oriented to person, place, and time.   Skin: Skin is warm and dry. No rash noted. No erythema.   Psychiatric: She has a normal mood and affect. Her behavior is normal. Judgment and thought content normal.   Vitals reviewed.      Lab Results   Component Value Date    WBC 4.66 10/09/2017    HGB 13.6 10/09/2017    HCT 40.6 10/09/2017    MCV 90 10/09/2017     10/09/2017         Chemistry        Component Value Date/Time     10/09/2017 1102    K 4.3 10/09/2017 1102     10/09/2017 1102    CO2 27 10/09/2017 1102    BUN 8 10/09/2017 1102    CREATININE 0.7 10/09/2017 1102     (H) 10/09/2017 1102        Component Value Date/Time    CALCIUM 9.5 10/09/2017 1102    ALKPHOS 154 (H) 10/09/2017 1102    AST 18 10/09/2017 1102    ALT 17 10/09/2017 1102    BILITOT 1.1 (H) 10/09/2017 1102    ESTGFRAFRICA >60.0 10/09/2017 1102    EGFRNONAA >60.0 10/09/2017 1102            Lab Results   Component Value Date    CHOL 234 (H) 05/08/2017    CHOL 192 11/08/2016    CHOL 194 05/04/2016     Lab Results   Component Value Date    HDL 54 05/08/2017    HDL 51 11/08/2016    HDL 44 05/04/2016     Lab Results   Component Value Date    LDLCALC 158.0 05/08/2017    LDLCALC 120.0 11/08/2016    LDLCALC 132.8 05/04/2016     Lab Results   Component Value Date    TRIG 110 05/08/2017    TRIG 105 11/08/2016    TRIG 86 05/04/2016     Lab Results   Component Value Date    CHOLHDL 23.1 05/08/2017    CHOLHDL 26.6 11/08/2016    CHOLHDL 22.7 05/04/2016       Lab Results   Component Value Date    TSH 1.737 10/09/2017       Lab Results   Component Value Date    HGBA1C 5.9 10/15/2009     The 10-year ASCVD risk score (Ada SETHI Jr., et al., 2013)  is: 11.2%    Values used to calculate the score:      Age: 67 years      Sex: Female      Is Non- : No      Diabetic: No      Tobacco smoker: No      Systolic Blood Pressure: 130 mmHg      Is BP treated: Yes      HDL Cholesterol: 54 mg/dL      Total Cholesterol: 234 mg/dL      Assessment:     1. Essential hypertension    2. Hyperlipidemia, unspecified hyperlipidemia type        Plan:     Restart Lipitor 10.    Advised to take ASA 81mg daily based on ASCVD risk score.    Continue current medicines.    Watch BP and blood sugar    Diet/exercise goals reinforced.    F/U 12 months

## 2017-12-20 ENCOUNTER — TELEPHONE (OUTPATIENT)
Dept: FAMILY MEDICINE | Facility: CLINIC | Age: 67
End: 2017-12-20

## 2017-12-20 NOTE — TELEPHONE ENCOUNTER
----- Message from Michelle Espinoza sent at 12/20/2017  4:03 PM CST -----  Contact: . [phone]  Patient would like a refill for atorvastatin (LIPITOR) 10 MG tablet sent to [facility] on [address]. Please advise.

## 2017-12-20 NOTE — TELEPHONE ENCOUNTER
----- Message from Tessa Espinosa sent at 12/20/2017 12:56 PM CST -----  Contact: /[place]  [phone]  Pt  requesting a refill for atorvastatin (LIPITOR) 10 MG tablet.  He would like to send the prescription to [facility] on [address].   Please advise

## 2017-12-21 ENCOUNTER — PATIENT MESSAGE (OUTPATIENT)
Dept: FAMILY MEDICINE | Facility: CLINIC | Age: 67
End: 2017-12-21

## 2017-12-21 RX ORDER — ATORVASTATIN CALCIUM 10 MG/1
10 TABLET, FILM COATED ORAL DAILY
COMMUNITY
End: 2017-12-21 | Stop reason: SDUPTHER

## 2017-12-21 RX ORDER — ATORVASTATIN CALCIUM 10 MG/1
10 TABLET, FILM COATED ORAL DAILY
Qty: 30 TABLET | Refills: 0 | Status: SHIPPED | OUTPATIENT
Start: 2017-12-21 | End: 2018-01-18 | Stop reason: SDUPTHER

## 2017-12-21 NOTE — TELEPHONE ENCOUNTER
----- Message from Rosaline Ribeiro sent at 12/21/2017 12:32 PM CST -----  Patient's daughter, Angela, called.   No. [phone]    2nd request    Patient needs Atorvastatin 10mg, 1 daily, #30.   [facility]   No. [phone]

## 2017-12-27 ENCOUNTER — TELEPHONE (OUTPATIENT)
Dept: INTERNAL MEDICINE | Facility: CLINIC | Age: 67
End: 2017-12-27

## 2017-12-27 ENCOUNTER — OFFICE VISIT (OUTPATIENT)
Dept: INTERNAL MEDICINE | Facility: CLINIC | Age: 67
End: 2017-12-27
Payer: MEDICARE

## 2017-12-27 VITALS
SYSTOLIC BLOOD PRESSURE: 144 MMHG | BODY MASS INDEX: 27.44 KG/M2 | WEIGHT: 139.75 LBS | OXYGEN SATURATION: 98 % | TEMPERATURE: 100 F | DIASTOLIC BLOOD PRESSURE: 88 MMHG | RESPIRATION RATE: 16 BRPM | HEART RATE: 92 BPM | HEIGHT: 60 IN

## 2017-12-27 DIAGNOSIS — R50.9 FEVER, UNSPECIFIED FEVER CAUSE: ICD-10-CM

## 2017-12-27 DIAGNOSIS — J11.1 FLU SYNDROME: Primary | ICD-10-CM

## 2017-12-27 LAB
FLUAV AG SPEC QL IA: POSITIVE
FLUBV AG SPEC QL IA: NEGATIVE
SPECIMEN SOURCE: ABNORMAL

## 2017-12-27 PROCEDURE — 87400 INFLUENZA A/B EACH AG IA: CPT | Mod: 59,PO

## 2017-12-27 PROCEDURE — 99214 OFFICE O/P EST MOD 30 MIN: CPT | Mod: S$GLB,,, | Performed by: INTERNAL MEDICINE

## 2017-12-27 PROCEDURE — 99999 PR PBB SHADOW E&M-EST. PATIENT-LVL IV: CPT | Mod: PBBFAC,,, | Performed by: INTERNAL MEDICINE

## 2017-12-27 RX ORDER — OSELTAMIVIR PHOSPHATE 75 MG/1
75 CAPSULE ORAL 2 TIMES DAILY
Qty: 10 CAPSULE | Refills: 0 | Status: SHIPPED | OUTPATIENT
Start: 2017-12-27 | End: 2018-01-01

## 2017-12-27 RX ORDER — ACETAMINOPHEN 500 MG
500 TABLET ORAL
Status: DISCONTINUED | OUTPATIENT
Start: 2017-12-27 | End: 2017-12-27

## 2017-12-27 RX ORDER — BENZONATATE 100 MG/1
100 CAPSULE ORAL 3 TIMES DAILY PRN
Qty: 30 CAPSULE | Refills: 0 | Status: SHIPPED | OUTPATIENT
Start: 2017-12-27 | End: 2018-01-06

## 2017-12-27 RX ORDER — ACETAMINOPHEN 500 MG
500 TABLET ORAL
Status: COMPLETED | OUTPATIENT
Start: 2017-12-27 | End: 2017-12-27

## 2017-12-27 RX ORDER — ACETAMINOPHEN 325 MG/1
650 TABLET ORAL
Status: DISCONTINUED | OUTPATIENT
Start: 2017-12-27 | End: 2017-12-27

## 2017-12-27 RX ORDER — CODEINE PHOSPHATE AND GUAIFENESIN 10; 100 MG/5ML; MG/5ML
5 SOLUTION ORAL 3 TIMES DAILY PRN
Qty: 118 ML | Refills: 0 | Status: SHIPPED | OUTPATIENT
Start: 2017-12-27 | End: 2018-01-06

## 2017-12-27 RX ADMIN — Medication 500 MG: at 03:12

## 2017-12-27 NOTE — PROGRESS NOTES
Subjective:       Patient ID: Ines Pacheco is a 67 y.o. female.    Chief Complaint: Influenza (fever, chills, coughing, abd pain (d/t cough))    HPI   Patient presenting with cough x 24 hrs.  She had fevers at home and chills.  The cough is dry.  No sick contacts.  She did not get a flu shot.  She notes myalgias.  She has some abdominal pain with cough.  No nausea or vomiting.  She tried tylenol to control fevers.        Review of Systems   Constitutional: Positive for chills, fatigue and fever. Negative for activity change.   HENT: Positive for rhinorrhea. Negative for congestion, sinus pain, sinus pressure and sore throat.    Eyes: Negative for pain and redness.   Respiratory: Positive for cough. Negative for shortness of breath.    Cardiovascular: Negative for chest pain.   Gastrointestinal: Positive for abdominal pain. Negative for constipation, nausea and vomiting.   Genitourinary: Negative for difficulty urinating.   Musculoskeletal: Positive for myalgias. Negative for arthralgias and joint swelling.   Skin: Negative for rash.   Neurological: Negative for dizziness and light-headedness.   Psychiatric/Behavioral: Negative for dysphoric mood and sleep disturbance.     Objective:     Vitals:    12/27/17 1438   BP: (!) 144/88   Pulse: 92   Resp: 16   Temp: 100 °F (37.8 °C)    Body mass index is 27.3 kg/m².  Physical Exam   Constitutional: She is oriented to person, place, and time. She appears well-developed and well-nourished.   HENT:   Head: Normocephalic and atraumatic.   Mouth/Throat: Posterior oropharyngeal edema and posterior oropharyngeal erythema present. No oropharyngeal exudate.   Eyes: Conjunctivae are normal. Pupils are equal, round, and reactive to light.   Neck: Normal range of motion. No tracheal deviation present. No thyromegaly present.   Cardiovascular: Normal rate, regular rhythm, normal heart sounds and intact distal pulses.  Exam reveals no gallop and no friction rub.    No murmur  heard.  Pulmonary/Chest: Effort normal and breath sounds normal. She has no wheezes. She has no rales.   Lymphadenopathy:        Head (right side): Tonsillar adenopathy present.        Head (left side): Tonsillar adenopathy present.     She has cervical adenopathy.   Neurological: She is alert and oriented to person, place, and time.   Skin: Skin is warm and dry. No rash noted.   Psychiatric: She has a normal mood and affect. Judgment normal.     Assessment:     1. Flu syndrome    2. Fever, unspecified fever cause      Plan:   1. Flu syndrome  - fluids, rest, OTC tylenol prn  - oseltamivir (TAMIFLU) 75 MG capsule; Take 1 capsule (75 mg total) by mouth 2 (two) times daily.  Dispense: 10 capsule; Refill: 0  - guaifenesin-codeine 100-10 mg/5 ml (TUSSI-ORGANIDIN NR)  mg/5 mL syrup; Take 5 mLs by mouth 3 (three) times daily as needed.  Dispense: 118 mL; Refill: 0  - benzonatate (TESSALON) 100 MG capsule; Take 1 capsule (100 mg total) by mouth 3 (three) times daily as needed.  Dispense: 30 capsule; Refill: 0  - Influenza antigen Nasopharyngeal Swab    2. Fever, unspecified fever cause  - oseltamivir (TAMIFLU) 75 MG capsule; Take 1 capsule (75 mg total) by mouth 2 (two) times daily.  Dispense: 10 capsule; Refill: 0  - acetaminophen tablet 650 mg; Take 2 tablets (650 mg total) by mouth one time.  - ED precautions provided      Patient is to call or return to clinic if symptoms worsen or fail to improve.

## 2017-12-27 NOTE — PATIENT INSTRUCTIONS

## 2017-12-27 NOTE — TELEPHONE ENCOUNTER
Positive for flu    Already sent script for tamiflu.    Results communicated to daughter.     Kimber Brown MD

## 2018-01-19 RX ORDER — ATORVASTATIN CALCIUM 10 MG/1
TABLET, FILM COATED ORAL
Qty: 30 TABLET | Refills: 0 | Status: SHIPPED | OUTPATIENT
Start: 2018-01-19 | End: 2018-02-24 | Stop reason: SDUPTHER

## 2018-02-01 DIAGNOSIS — I10 ESSENTIAL HYPERTENSION: ICD-10-CM

## 2018-02-01 RX ORDER — AMLODIPINE AND BENAZEPRIL HYDROCHLORIDE 10; 20 MG/1; MG/1
1 CAPSULE ORAL DAILY
Qty: 90 CAPSULE | Refills: 1 | Status: SHIPPED | OUTPATIENT
Start: 2018-02-01 | End: 2018-02-26 | Stop reason: SDUPTHER

## 2018-02-26 ENCOUNTER — OFFICE VISIT (OUTPATIENT)
Dept: FAMILY MEDICINE | Facility: CLINIC | Age: 68
End: 2018-02-26
Payer: MEDICARE

## 2018-02-26 ENCOUNTER — HOSPITAL ENCOUNTER (OUTPATIENT)
Dept: RADIOLOGY | Facility: HOSPITAL | Age: 68
Discharge: HOME | End: 2018-02-26
Attending: FAMILY MEDICINE
Payer: MEDICARE

## 2018-02-26 VITALS
HEIGHT: 60 IN | DIASTOLIC BLOOD PRESSURE: 76 MMHG | SYSTOLIC BLOOD PRESSURE: 130 MMHG | WEIGHT: 132.25 LBS | HEART RATE: 78 BPM | BODY MASS INDEX: 25.97 KG/M2

## 2018-02-26 DIAGNOSIS — G89.29 CHRONIC BILATERAL BACK PAIN, UNSPECIFIED BACK LOCATION: ICD-10-CM

## 2018-02-26 DIAGNOSIS — E78.5 HYPERLIPIDEMIA, UNSPECIFIED HYPERLIPIDEMIA TYPE: ICD-10-CM

## 2018-02-26 DIAGNOSIS — M54.9 CHRONIC BILATERAL BACK PAIN, UNSPECIFIED BACK LOCATION: ICD-10-CM

## 2018-02-26 DIAGNOSIS — R73.01 IMPAIRED FASTING GLUCOSE: ICD-10-CM

## 2018-02-26 DIAGNOSIS — Z12.11 SCREEN FOR COLON CANCER: ICD-10-CM

## 2018-02-26 DIAGNOSIS — I10 ESSENTIAL HYPERTENSION: Primary | ICD-10-CM

## 2018-02-26 LAB
BILIRUB UR QL STRIP: NEGATIVE
CLARITY UR REFRACT.AUTO: CLEAR
COLOR UR AUTO: YELLOW
GLUCOSE UR QL STRIP: NEGATIVE
HGB UR QL STRIP: ABNORMAL
KETONES UR QL STRIP: NEGATIVE
LEUKOCYTE ESTERASE UR QL STRIP: NEGATIVE
MICROSCOPIC COMMENT: ABNORMAL
NITRITE UR QL STRIP: NEGATIVE
PH UR STRIP: 7 [PH] (ref 5–8)
PROT UR QL STRIP: NEGATIVE
RBC #/AREA URNS AUTO: 9 /HPF (ref 0–4)
SP GR UR STRIP: 1.01 (ref 1–1.03)
SQUAMOUS #/AREA URNS AUTO: 0 /HPF
URN SPEC COLLECT METH UR: ABNORMAL
UROBILINOGEN UR STRIP-ACNC: NEGATIVE EU/DL
WBC #/AREA URNS AUTO: 1 /HPF (ref 0–5)

## 2018-02-26 PROCEDURE — 3008F BODY MASS INDEX DOCD: CPT | Mod: S$GLB,,, | Performed by: FAMILY MEDICINE

## 2018-02-26 PROCEDURE — 99999 PR PBB SHADOW E&M-EST. PATIENT-LVL III: CPT | Mod: PBBFAC,,, | Performed by: FAMILY MEDICINE

## 2018-02-26 PROCEDURE — 99214 OFFICE O/P EST MOD 30 MIN: CPT | Mod: S$GLB,,, | Performed by: FAMILY MEDICINE

## 2018-02-26 PROCEDURE — 72100 X-RAY EXAM L-S SPINE 2/3 VWS: CPT | Mod: 26,,, | Performed by: RADIOLOGY

## 2018-02-26 PROCEDURE — 81001 URINALYSIS AUTO W/SCOPE: CPT

## 2018-02-26 PROCEDURE — 1126F AMNT PAIN NOTED NONE PRSNT: CPT | Mod: S$GLB,,, | Performed by: FAMILY MEDICINE

## 2018-02-26 PROCEDURE — 72100 X-RAY EXAM L-S SPINE 2/3 VWS: CPT | Mod: TC,PO

## 2018-02-26 PROCEDURE — 99499 UNLISTED E&M SERVICE: CPT | Mod: S$GLB,,, | Performed by: FAMILY MEDICINE

## 2018-02-26 PROCEDURE — 1159F MED LIST DOCD IN RCRD: CPT | Mod: S$GLB,,, | Performed by: FAMILY MEDICINE

## 2018-02-26 RX ORDER — AMLODIPINE AND BENAZEPRIL HYDROCHLORIDE 10; 20 MG/1; MG/1
1 CAPSULE ORAL DAILY
Qty: 90 CAPSULE | Refills: 3 | Status: SHIPPED | OUTPATIENT
Start: 2018-02-26 | End: 2019-02-20 | Stop reason: SDUPTHER

## 2018-02-26 RX ORDER — ATORVASTATIN CALCIUM 10 MG/1
10 TABLET, FILM COATED ORAL DAILY
Qty: 90 TABLET | Refills: 3 | Status: SHIPPED | OUTPATIENT
Start: 2018-02-26 | End: 2019-02-20 | Stop reason: SDUPTHER

## 2018-02-26 RX ORDER — ATORVASTATIN CALCIUM 10 MG/1
TABLET, FILM COATED ORAL
Qty: 30 TABLET | Refills: 0 | Status: SHIPPED | OUTPATIENT
Start: 2018-02-26 | End: 2018-02-26 | Stop reason: SDUPTHER

## 2018-02-26 NOTE — PROGRESS NOTES
Subjective:       Patient ID: Ines Pacheco is a 67 y.o. female.    Chief Complaint: Follow-up and Hypertension    67 years old female came to the clinic for blood pressure check.  Blood pressure today stable.  No chest pain palpitations orthopnea or PND.  Patient was slightly elevated blood sugar before.  No polyuria polydipsia or polyphagia.  Patient with chronic back pain for the last 3 months.  The pain is 3 out of 10 of intensity on and off aggravated with activity and better with rest.      Hypertension   Pertinent negatives include no chest pain or palpitations.     Review of Systems   Constitutional: Negative.    HENT: Negative.    Eyes: Negative.    Respiratory: Negative.    Cardiovascular: Negative.  Negative for chest pain, palpitations and leg swelling.   Gastrointestinal: Negative.    Endocrine: Negative for polydipsia, polyphagia and polyuria.   Genitourinary: Negative.    Musculoskeletal: Negative.    Skin: Negative.    Neurological: Negative.    Psychiatric/Behavioral: Negative.        Objective:      Physical Exam   Constitutional: She is oriented to person, place, and time. She appears well-developed and well-nourished. No distress.   HENT:   Head: Normocephalic and atraumatic.   Right Ear: External ear normal.   Left Ear: External ear normal.   Nose: Nose normal.   Mouth/Throat: Oropharynx is clear and moist. No oropharyngeal exudate.   Eyes: Conjunctivae and EOM are normal. Pupils are equal, round, and reactive to light. Right eye exhibits no discharge. Left eye exhibits no discharge. No scleral icterus.   Neck: Normal range of motion. Neck supple. No JVD present. No tracheal deviation present. No thyromegaly present.   Cardiovascular: Normal rate, regular rhythm, normal heart sounds and intact distal pulses.  Exam reveals no gallop and no friction rub.    No murmur heard.  Pulmonary/Chest: Effort normal and breath sounds normal. No stridor. No respiratory distress. She has no wheezes. She  has no rales. She exhibits no tenderness.   Abdominal: Soft. Bowel sounds are normal. She exhibits no distension and no mass. There is no tenderness. There is no rebound and no guarding.   Musculoskeletal: She exhibits no edema.        Lumbar back: She exhibits decreased range of motion, tenderness, pain and spasm.   Lymphadenopathy:     She has no cervical adenopathy.   Neurological: She is alert and oriented to person, place, and time. She has normal reflexes. No cranial nerve deficit. She exhibits normal muscle tone. Coordination normal.   Skin: Skin is warm and dry. No rash noted. She is not diaphoretic. No erythema. No pallor.   Psychiatric: She has a normal mood and affect. Her behavior is normal. Judgment and thought content normal.       Assessment:       1. Essential hypertension    2. Hyperlipidemia, unspecified hyperlipidemia type    3. Screen for colon cancer    4. Impaired fasting glucose    5. Chronic bilateral back pain, unspecified back location        Plan:         Ines was seen today for follow-up and hypertension.    Diagnoses and all orders for this visit:    Essential hypertension  -     Comprehensive metabolic panel; Future  -     Lipid panel; Future  -     Urinalysis  -     TSH; Future  -     CBC auto differential; Future  -     amlodipine-benazepril 10-20mg (LOTREL) 10-20 mg per capsule; Take 1 capsule by mouth once daily.    Hyperlipidemia, unspecified hyperlipidemia type  -     Comprehensive metabolic panel; Future  -     Lipid panel; Future  -     TSH; Future  -     atorvastatin (LIPITOR) 10 MG tablet; Take 1 tablet (10 mg total) by mouth once daily.    Screen for colon cancer  -     Fecal Immunochemical Test (iFOBT)  -     Case request GI: COLONOSCOPY    Impaired fasting glucose  -     Hemoglobin A1c; Future    Chronic bilateral back pain, unspecified back location  -     X-Ray Lumbar Spine Ap And Lateral; Future    Continue monitoring blood pressure at home, low sodium diet.

## 2018-02-26 NOTE — PATIENT INSTRUCTIONS
Consejos Para El Cuidado De La Espalda [Back Care Tips]    Hay ciertas cosas que usted puede hacer para prevenir la recurrencia del dolor de espalda alyssa y reducir los síntomas del dolor de espalda crónico:  · Mantenga un peso saludable. Si tiene sobrepeso, perder peso le ayudará a aliviar la mayoría de los libia de espalda.  · El ejercicio es baron parte importante de la recuperación de los libia de espalda. La espalda está soportada por los músculos que se encuentran detrás y ibeth de la columna vertebral. [place] significa que los músculos de la espalda y del abdomen deben fortalecerse para luisito mejor soporte a la columna vertebral.   · Nadar y caminar a paso rápido son buenas formas de ejercicio para mejorar jeong nivel de forma física.  · Practique técnicas seguras para levantar objetos (jose la información que se presenta más abajo).  · Adopte posturas correctas al estar sentado, parado o caminando. Evite permanecer sentado stuart períodos largos, ya que esto implica más esfuerzo en la parte inferior de la espalda que cuando está de pie o caminando.  · Póngase zapatos de buena calidad con suficiente soporte del arco. La alineación de los pies y de los tobillos puede afectar los síntomas de la espalda. Las mujeres deben evitar los zapatos de tacón alto.  · Los masajes terapéuticos pueden ayudar a aliviar el dolor de espalda crónico.  · Stuart los primeros dos días después de baron lesión severa o un ataque de dolor crónico de espalda, aplique baron bolsa de hielo sobre la mary jo dolorida stuart 20 minutos cada 2-4 horas. [place] reducirá la hinchazón y el dolor. El calor (duchas o maria dolores en [place] o baron almohadilla calefactora) ayudan a reducir los espasmos musculares. Puede comenzar aplicando hielo y luego aplicar calor al cabo de dos días. Algunos pacientes se sienten mejor alternando los tratamientos con hielo y con calor. Utilice el método que le dé mejor resultado.  · Puede usar acetaminofén (Tylenol) o  ibuprofeno (Motrin o Advil) para controlar el dolor, a menos que le hayan recetado otro medicamento.[NOTA: Si tiene baron enfermedad hepática o renal crónica, o ha tenido alguna vez baron úlcera estomacal o sangrado gastrointestinal, consulte con jeong médico antes de david estos medicamentos.]     Estiramiento Lumbar  Anastasia es un ejercicio simple de estiramiento que le ayudará a relajar los espasmos musculares y a mantener la espalda más flexible. Si el ejercicio le empeora el dolor de espalda, deje de hacerlo.  · Acuéstese boca arriba con las rodillas flexionadas y ambos pies apoyados en el piso.  · Levante lentamente la rodilla izquierda hacia el pecho, con la espalda plana contra el piso. Sostenga la posición stuart 5 segundos.  · Relájese y repita el ejercicio con la rodilla derecha.  · Repita el ejercicio 10 veces con cada pierna.  Técnica Para Levantar Objetos De Forma Soni  · No doble la cintura para levantar objetos del suelo. En vez de hacer eso, agáchese doblando las rodillas y las caderas.   · Mantenga la espalda recta y la jimbo erguida.   · Agarre el objeto cerca del cuerpo, directamente frente a usted.  · Enderece las piernas para levantar el objeto.   · Para bajar el objeto, siga la misma técnica en orden inverso.  · Si tiene que arrastrar un objeto por el suelo, empújelo.  Consejos Para Mantener Baron Temple Postura  SENTADO  Siéntese en breanna con respaldos rectos o con soporte para la parte inferior de la espalda. Mantenga las rodillas un poco más elevadas que las caderas. En smitha necesario, use un banquito o taburete bajo para mantener los pies apoyados sobre baron superficie sólida.  Siéntese con la espalda recta mientras maneja. Ajuste el asiento hacia adelante para no tener que inclinarse hacia el volante. Baron almohada pequeña o baron toalla enrollada detrás de la parte inferior de la espalda podría ser útil para conducir en viajes largos.   DE PIE  Cuando tenga que estar parado (de pie) stuart largos  períodos apoye la mayor parte del peso sobre baron pierna, cambiando de pierna cada pocos minutos.   AL DORMIR  La mejor manera de dormir es de lado, con las rodillas dobladas. Apoye la jimbo en baron almohada baja para luisito soporte al etelvina de forma que la columna vertebral quede en posición neutral. Evite las almohadas demasiado gruesas que doblan el etelvina hacia un lado. Ponga baron almohada entre las piernas para relajar más la parte inferior de la espalda. Si duerme boca arriba, ponga almohadas debajo de las rodillas para mantener las piernas ligeramente flexionadas. Use un colchón firme. Si jeong colchón se hunde en la parte donde duerme, cámbielo o use braon tabla de merrill contrachapada de media pulgada de espesor debajo del colchón para darle más soporte.  Programe baron VISITA DE CONTROL con jeong médico, o según le indique nuestro personal médico.  [NOTA: Si le hicieron radiografías, tomografías computarizadas o resonancias magnéticas, estas serán examinadas por un radiólogo y le informarán de los nuevos hallazgos que puedan afectar la atención médica que necesita.]  Regrese Sin Demora  o llame al médico si nota alguno de los siguientes síntomas:  · El dolor empeora o se extiende a los brazos o a las piernas  · Debilidad o entumecimiento en pili o ambos brazos o piernas.  · Pérdida de control del intestino o de la vejiga  · Entumecimiento en la mary jo de las ingles  Date Last Reviewed: 6/1/2016  © 1101-7786 [employer]. [address] [place] [address], [place] [address]. Todos los derechos reservados. Esta información no pretende sustituir la atención médica profesional. Sólo jeong médico puede diagnosticar y tratar un problema de david.

## 2018-05-08 ENCOUNTER — PES CALL (OUTPATIENT)
Dept: ADMINISTRATIVE | Facility: CLINIC | Age: 68
End: 2018-05-08

## 2018-06-18 ENCOUNTER — OFFICE VISIT (OUTPATIENT)
Dept: OPTOMETRY | Facility: CLINIC | Age: 68
End: 2018-06-18
Payer: MEDICARE

## 2018-06-18 DIAGNOSIS — Z13.5 GLAUCOMA SCREENING: ICD-10-CM

## 2018-06-18 DIAGNOSIS — H52.203 ASTIGMATISM OF BOTH EYES WITH PRESBYOPIA: ICD-10-CM

## 2018-06-18 DIAGNOSIS — Z96.1 PSEUDOPHAKIA OF BOTH EYES: Primary | ICD-10-CM

## 2018-06-18 DIAGNOSIS — H52.4 ASTIGMATISM OF BOTH EYES WITH PRESBYOPIA: ICD-10-CM

## 2018-06-18 PROCEDURE — 92014 COMPRE OPH EXAM EST PT 1/>: CPT | Mod: S$GLB,,, | Performed by: OPTOMETRIST

## 2018-06-18 PROCEDURE — 99999 PR PBB SHADOW E&M-EST. PATIENT-LVL II: CPT | Mod: PBBFAC,,, | Performed by: OPTOMETRIST

## 2018-06-18 PROCEDURE — 92015 DETERMINE REFRACTIVE STATE: CPT | Mod: S$GLB,,, | Performed by: OPTOMETRIST

## 2018-06-18 NOTE — PROGRESS NOTES
HPI     DLS: 5/26/2017 with Dr. Rubin   Pt states glasses are only good for reading, states dist and intermediate   va is blurry--wishes bifocals. Denies f/f     Refresh ou qhs     Sp PC IOL OU    Last edited by Gatito Arora, OD on 6/18/2018 10:13 AM. (History)        ROS     Positive for: Eyes (cat surgery OU)    Negative for: Constitutional, Gastrointestinal, Neurological, Skin,   Genitourinary, Musculoskeletal, HENT, Endocrine, Cardiovascular,   Respiratory, Psychiatric, Allergic/Imm, Heme/Lymph    Last edited by Gatito Arora, OD on 6/18/2018 10:13 AM. (History)        Assessment /Plan     For exam results, see Encounter Report.    Pseudophakia of both eyes    Glaucoma screening    Astigmatism of both eyes with presbyopia      1. Mild pco sp pciol OU--pt has readers, but wishes PALs. w rote spex Rx  2. Dry eye sx--advised SYS BAl or SOOTHE XP ATs BID    PLAN:    rtc 1 yr

## 2018-07-23 ENCOUNTER — TELEPHONE (OUTPATIENT)
Dept: FAMILY MEDICINE | Facility: CLINIC | Age: 68
End: 2018-07-23

## 2018-07-23 DIAGNOSIS — Z12.31 SCREENING MAMMOGRAM, ENCOUNTER FOR: Primary | ICD-10-CM

## 2018-07-24 ENCOUNTER — HOSPITAL ENCOUNTER (OUTPATIENT)
Dept: RADIOLOGY | Facility: HOSPITAL | Age: 68
Discharge: HOME | End: 2018-07-24
Attending: FAMILY MEDICINE
Payer: MEDICARE

## 2018-07-24 DIAGNOSIS — Z12.31 SCREENING MAMMOGRAM, ENCOUNTER FOR: ICD-10-CM

## 2018-07-24 PROCEDURE — 77067 SCR MAMMO BI INCL CAD: CPT | Mod: 26,,, | Performed by: RADIOLOGY

## 2018-07-24 PROCEDURE — 77063 BREAST TOMOSYNTHESIS BI: CPT | Mod: 26,,, | Performed by: RADIOLOGY

## 2018-07-24 PROCEDURE — 77067 SCR MAMMO BI INCL CAD: CPT | Mod: TC,PO

## 2018-07-28 ENCOUNTER — PATIENT MESSAGE (OUTPATIENT)
Dept: FAMILY MEDICINE | Facility: CLINIC | Age: 68
End: 2018-07-28

## 2018-07-31 ENCOUNTER — OFFICE VISIT (OUTPATIENT)
Dept: INTERNAL MEDICINE | Facility: CLINIC | Age: 68
End: 2018-07-31
Payer: MEDICARE

## 2018-07-31 VITALS
DIASTOLIC BLOOD PRESSURE: 80 MMHG | WEIGHT: 141.31 LBS | HEIGHT: 60 IN | HEART RATE: 82 BPM | SYSTOLIC BLOOD PRESSURE: 134 MMHG | BODY MASS INDEX: 27.74 KG/M2 | OXYGEN SATURATION: 97 %

## 2018-07-31 DIAGNOSIS — R10.2 SUPRAPUBIC PRESSURE: ICD-10-CM

## 2018-07-31 DIAGNOSIS — M25.473 ANKLE SWELLING, UNSPECIFIED LATERALITY: Primary | ICD-10-CM

## 2018-07-31 LAB
BILIRUB UR QL STRIP: NEGATIVE
CLARITY UR REFRACT.AUTO: CLEAR
COLOR UR AUTO: ABNORMAL
GLUCOSE UR QL STRIP: NEGATIVE
HGB UR QL STRIP: ABNORMAL
KETONES UR QL STRIP: NEGATIVE
LEUKOCYTE ESTERASE UR QL STRIP: NEGATIVE
MICROSCOPIC COMMENT: NORMAL
NITRITE UR QL STRIP: NEGATIVE
PH UR STRIP: 8 [PH] (ref 5–8)
PROT UR QL STRIP: NEGATIVE
RBC #/AREA URNS AUTO: 0 /HPF (ref 0–4)
SP GR UR STRIP: 1 (ref 1–1.03)
SQUAMOUS #/AREA URNS AUTO: 0 /HPF
URN SPEC COLLECT METH UR: ABNORMAL
UROBILINOGEN UR STRIP-ACNC: NEGATIVE EU/DL
WBC #/AREA URNS AUTO: 0 /HPF (ref 0–5)

## 2018-07-31 PROCEDURE — 3075F SYST BP GE 130 - 139MM HG: CPT | Mod: CPTII,S$GLB,, | Performed by: INTERNAL MEDICINE

## 2018-07-31 PROCEDURE — 81001 URINALYSIS AUTO W/SCOPE: CPT

## 2018-07-31 PROCEDURE — 87086 URINE CULTURE/COLONY COUNT: CPT

## 2018-07-31 PROCEDURE — 99999 PR PBB SHADOW E&M-EST. PATIENT-LVL III: CPT | Mod: PBBFAC,,, | Performed by: INTERNAL MEDICINE

## 2018-07-31 PROCEDURE — 99213 OFFICE O/P EST LOW 20 MIN: CPT | Mod: S$GLB,,, | Performed by: INTERNAL MEDICINE

## 2018-07-31 PROCEDURE — 3079F DIAST BP 80-89 MM HG: CPT | Mod: CPTII,S$GLB,, | Performed by: INTERNAL MEDICINE

## 2018-07-31 NOTE — PATIENT INSTRUCTIONS
Recommendations for today    Swelling of the ankles does not appear to be related to any dangerous medical condition.  It is very likely that the swelling was related to prolong standing.  Especially because the swelling has gotten better over time it is unlikely that you need blood work today.  However if the swelling comes back and does not improve with leg elevation you should have your primary care doctor do additional blood work.    Symptoms in the lower abdomen may be related to vaginal prolapse.  Especially if the symptoms recur over the next 2 weeks we recommend that you have formal evaluation by a gynecologist

## 2018-07-31 NOTE — PROGRESS NOTES
Portions of this note are generated with voice recognition software. Typographical errors may exist.     SUBJECTIVE:    This is a/an 67 y.o. female here for primary care visit for  Chief Complaint   Patient presents with    Foot Swelling     bilateral for over a week    Abdominal Cramping     Patient states that she was in her usual state of health until about 2 weeks ago.  She had the gradual development of ankle swelling bilaterally. This was in the setting of prolonged standing.  The swelling was painless.  The patient states that she traditionally will be on her feet for about 3 hr to make dinner in the evening but this evening was uncharacteristically prolonged.  She states that with leg elevation the swelling gradually went away.  The patient denies any unusual dyspnea with activities of daily living.  She has not changed medications.  She has been on amlodipine for many years.  She has not changed the dosage.    The patient states that since Sunday she has started to have a new symptom.  She feels suprapubic pressure.  This has been recurring frequently over the last several days.  No associated dysuria or hematuria or urinary frequency.  She has a history of hysterectomy in the remote past.  She has not received a gynecologic evaluation to see if she has vaginal prolapse.    Medications Reviewed and Updated    Past medical, family, and social histories were reviewed and updated.    Review of Systems negative unless otherwise noted in history of present illness-  ROS    General ROS: negative  Psychological ROS: negative  Endocrine ROS: Negative  Allergy and Immunology ROS: negative  Cardiovascular ROS: negative  Pulmonary ROS: Negative  Gastrointestinal ROS: negative  Genito-Urinary ROS: negative  Musculoskeletal ROS: negative  Neurological ROS: negative  Dermatological ROS: negative        Allergic:  Review of patient's allergies indicates:  No Known Allergies    OBJECTIVE:  BP: 134/80 Pulse: 82    Wt  Readings from Last 3 Encounters:   07/31/18 64.1 kg (141 lb 5 oz)   02/26/18 60 kg (132 lb 4.4 oz)   12/27/17 63.4 kg (139 lb 12.4 oz)    Body mass index is 27.6 kg/m².  Previous Blood Pressure Readings :   BP Readings from Last 3 Encounters:   07/31/18 134/80   02/26/18 130/76   12/27/17 (!) 144/88       Physical Exam    GEN: No apparent distress  HEENT: sclera non-icteric, conjunctiva clear  CV: no peripheral edema. Regular rate and rhythm. Grade 1/6 systolic murmur heard loudest in the apex.  PULM: breathing non-labored  ABD:  protuberant abdomen.  No suprapubic discomfort on palpation.  No masses.  PSYCH: appropriate affect  MSK: able to rise from chair without assistance  SKIN: normal skin turgor    Pertinent Labs Reviewed       ASSESSMENT/PLAN:    Ankle swelling, unspecified laterality.This is a New problem. The etiology is Likely venous insufficiency.  Although there is a systolic murmur noted on examination today it is very low grade.  Patient without other symptoms to indicate that edema was cardiogenic.  Moreover symptoms have resolved . The problem is improving. The risk of medical complications is low. Treatment/diagnostic recommendations are to continue with the same medical plan in addition to instructions noted on the After Visit Summary. The patient advised if symptoms change or intensify to seek medical care.     Suprapubic pressure..This is a New problem. The etiology is Unknown.  Rule out UTI.  Rule out organ prolapse.. The problem is not adequately controlled. The risk of medical complications is low. Treatment/diagnostic recommendations are to modify the diagnostic/treatment plan as follows in addition to instructions noted on the After Visit Summary. The patient advised if symptoms change or intensify to seek medical care.   -     URINALYSIS  -     Urine culture  -     Ambulatory referral to Obstetrics / Gynecology      Future Appointments  Date Time Provider Department Center   8/15/2018 10:00  AM Jose Fang MD [facility]       Aaron Shabazz  7/31/2018  1:35 PM

## 2018-08-01 ENCOUNTER — TELEPHONE (OUTPATIENT)
Dept: FAMILY MEDICINE | Facility: CLINIC | Age: 68
End: 2018-08-01

## 2018-08-01 ENCOUNTER — TELEPHONE (OUTPATIENT)
Dept: RADIOLOGY | Facility: HOSPITAL | Age: 68
End: 2018-08-01

## 2018-08-01 NOTE — TELEPHONE ENCOUNTER
----- Message from Allen Stern sent at 8/1/2018  9:36 AM CDT -----  Contact: Spouse [phone]   Patient would like to get test results.  Name of test (lab, mammo, etc.):  [facility]  Date of test:  7/26/18  Ordering provider: Dr Pena  Where was the test performed:  [facility]  Comments:  of patient would also like for results to be sent to [email or web address] if possible please.    Please call an advise  Thank you

## 2018-08-01 NOTE — TELEPHONE ENCOUNTER
phoned requesting mammo results for wife, he states noone has called her with results,  informed they did try calling her and also a letter was sent in the mail,  given phone number [phone] to call for her mammo results, verb understanding

## 2018-08-01 NOTE — TELEPHONE ENCOUNTER
Called patient in reference to her mammogram. . No answer. Left the patient a message with my direct phone number.

## 2018-08-02 ENCOUNTER — PATIENT MESSAGE (OUTPATIENT)
Dept: INTERNAL MEDICINE | Facility: CLINIC | Age: 68
End: 2018-08-02

## 2018-08-02 LAB — BACTERIA UR CULT: NORMAL

## 2018-09-24 ENCOUNTER — PES CALL (OUTPATIENT)
Dept: ADMINISTRATIVE | Facility: CLINIC | Age: 68
End: 2018-09-24

## 2018-11-20 ENCOUNTER — IMMUNIZATION (OUTPATIENT)
Dept: FAMILY MEDICINE | Facility: CLINIC | Age: 68
End: 2018-11-20
Payer: MEDICARE

## 2018-11-20 PROCEDURE — G0008 ADMIN INFLUENZA VIRUS VAC: HCPCS | Mod: S$GLB,,, | Performed by: FAMILY MEDICINE

## 2018-11-20 PROCEDURE — 90662 IIV NO PRSV INCREASED AG IM: CPT | Mod: S$GLB,,, | Performed by: FAMILY MEDICINE

## 2019-02-20 ENCOUNTER — OFFICE VISIT (OUTPATIENT)
Dept: CARDIOLOGY | Facility: CLINIC | Age: 69
End: 2019-02-20
Payer: MEDICARE

## 2019-02-20 VITALS
HEIGHT: 62 IN | BODY MASS INDEX: 25.58 KG/M2 | WEIGHT: 139 LBS | SYSTOLIC BLOOD PRESSURE: 138 MMHG | DIASTOLIC BLOOD PRESSURE: 80 MMHG | HEART RATE: 80 BPM

## 2019-02-20 DIAGNOSIS — I10 ESSENTIAL HYPERTENSION: ICD-10-CM

## 2019-02-20 DIAGNOSIS — E78.5 HYPERLIPIDEMIA, UNSPECIFIED HYPERLIPIDEMIA TYPE: ICD-10-CM

## 2019-02-20 PROCEDURE — 99213 PR OFFICE/OUTPT VISIT, EST, LEVL III, 20-29 MIN: ICD-10-PCS | Mod: S$GLB,,, | Performed by: INTERNAL MEDICINE

## 2019-02-20 PROCEDURE — 1101F PR PT FALLS ASSESS DOC 0-1 FALLS W/OUT INJ PAST YR: ICD-10-PCS | Mod: CPTII,S$GLB,, | Performed by: INTERNAL MEDICINE

## 2019-02-20 PROCEDURE — 1101F PT FALLS ASSESS-DOCD LE1/YR: CPT | Mod: CPTII,S$GLB,, | Performed by: INTERNAL MEDICINE

## 2019-02-20 PROCEDURE — 99499 RISK ADDL DX/OHS AUDIT: ICD-10-PCS | Mod: S$GLB,,, | Performed by: INTERNAL MEDICINE

## 2019-02-20 PROCEDURE — 3075F SYST BP GE 130 - 139MM HG: CPT | Mod: CPTII,S$GLB,, | Performed by: INTERNAL MEDICINE

## 2019-02-20 PROCEDURE — 99499 UNLISTED E&M SERVICE: CPT | Mod: S$GLB,,, | Performed by: INTERNAL MEDICINE

## 2019-02-20 PROCEDURE — 3079F PR MOST RECENT DIASTOLIC BLOOD PRESSURE 80-89 MM HG: ICD-10-PCS | Mod: CPTII,S$GLB,, | Performed by: INTERNAL MEDICINE

## 2019-02-20 PROCEDURE — 99213 OFFICE O/P EST LOW 20 MIN: CPT | Mod: S$GLB,,, | Performed by: INTERNAL MEDICINE

## 2019-02-20 PROCEDURE — 99999 PR PBB SHADOW E&M-EST. PATIENT-LVL III: ICD-10-PCS | Mod: PBBFAC,,, | Performed by: INTERNAL MEDICINE

## 2019-02-20 PROCEDURE — 3075F PR MOST RECENT SYSTOLIC BLOOD PRESS GE 130-139MM HG: ICD-10-PCS | Mod: CPTII,S$GLB,, | Performed by: INTERNAL MEDICINE

## 2019-02-20 PROCEDURE — 99999 PR PBB SHADOW E&M-EST. PATIENT-LVL III: CPT | Mod: PBBFAC,,, | Performed by: INTERNAL MEDICINE

## 2019-02-20 PROCEDURE — 3079F DIAST BP 80-89 MM HG: CPT | Mod: CPTII,S$GLB,, | Performed by: INTERNAL MEDICINE

## 2019-02-20 RX ORDER — ATORVASTATIN CALCIUM 10 MG/1
10 TABLET, FILM COATED ORAL DAILY
Qty: 90 TABLET | Refills: 3 | Status: SHIPPED | OUTPATIENT
Start: 2019-02-20 | End: 2019-06-17 | Stop reason: SDUPTHER

## 2019-02-20 RX ORDER — AMLODIPINE AND BENAZEPRIL HYDROCHLORIDE 10; 20 MG/1; MG/1
1 CAPSULE ORAL DAILY
Qty: 90 CAPSULE | Refills: 3 | Status: SHIPPED | OUTPATIENT
Start: 2019-02-20 | End: 2019-06-17 | Stop reason: SDUPTHER

## 2019-02-20 NOTE — PROGRESS NOTES
Subjective:   Patient ID:  Ines Pacheco is a 68 y.o. female who presents for follow up of Hypertension      HPI: Routine 15 month f/u.  She is doing well with no new symptoms or cardiovascular complaints and no change in exercise capacity.  She denies chest discomfort, [place], palpitations, PND/orthopnea, lightheadedness and syncope.    She hasn't taken her medicines yet today.    Not exercising but doesn't sit still at home.    She's watching her Na+ intake.    2017 HPI: Back for routine f/u.  Did not get set up with digital HTN.  Very brief palpitations once.  Very brief lightheadedness once without coming close to losing consciousness (this happened in [facility]).  She then says she had an episode where she passed out in [place] at a  where it was very hot and she had not been eating or drinking.       No angina or new [place].  No significant palpitations.  No PND/orthopnea.     Oct 2016 HPI: Back for 3 month f/u.  Stopped taking HCTZ because it made her vomit.  BPs have been good at home but she did not yet enroll in digital HTN.     She denies chest discomfort, [place], palpitations, PND/orthopnea, lightheadedness and syncope.     Stress level is better though she still has some from stress from her son who is bed bound (he was born with hydrocephalus).     2016 HPI: Very pleasant woman who previously saw Dr. Arrieta. She's here because her BP has been higher than usual in the past couple of days. SBPs in the 150s-160s. She denies chest discomfort, [place], palpitations, PND/orthopnea, lightheadedness and syncope. She has a little stress in her life - concerns about her son and daughter     2014 HPI (Meenakshi): 63 y.o. female from [facility] with HTN and HLD who comes for follow up of hypertension and dyslipidemia. She last saw Dr. Escalante in 2013. She is doing well and denies syncope, lightheadedness, chest discomfort, shortness of breath (rest or dyspnea on exertion), palpitations, PND/orthopnea,  or lower extremity swelling. She stopped her lipitor her PCP and her lipid values recently were worse. Her BP has been well controlled.     Patient Active Problem List   Diagnosis    Hyperlipidemia    Hypertension    Generalized osteoarthritis    AR (allergic rhinitis)    Cough    Onychomycosis due to dermatophyte    Nuclear sclerotic cataract of left eye    Post-operative state       Current Outpatient Medications   Medication Sig    amlodipine-benazepril 10-20mg (LOTREL) 10-20 mg per capsule Take 1 capsule by mouth once daily.    atorvastatin (LIPITOR) 10 MG tablet Take 1 tablet (10 mg total) by mouth once daily.    calcium carbonate-vit D3-min 600 mg calcium- 400 unit Tab Take 1 tablet by mouth 2 (two) times daily.     No current facility-administered medications for this visit.        Review of Systems   Constitution: Negative.   HENT: Negative.    Eyes: Negative.    Cardiovascular: Negative.  Negative for chest pain, dyspnea on exertion, near-syncope, orthopnea and palpitations.   Respiratory: Negative.  Negative for cough, hemoptysis and shortness of breath.    Endocrine: Negative.    Hematologic/Lymphatic: Negative.    Skin: Negative.    Musculoskeletal: Negative.    Gastrointestinal: Negative.    Genitourinary: Negative.    Neurological: Negative.    Psychiatric/Behavioral: Negative.      Objective:   Physical Exam   Constitutional: She is oriented to person, place, and time. She appears well-developed and well-nourished.   HENT:   Head: Normocephalic and atraumatic.   Mouth/Throat: Oropharynx is clear and moist.   Eyes: Conjunctivae and EOM are normal. No scleral icterus.   Neck: Normal range of motion. Neck supple. No JVD present.   Cardiovascular: Normal rate, regular rhythm, normal heart sounds and intact distal pulses. Exam reveals no gallop and no friction rub.   No murmur heard.  Pulmonary/Chest: Effort normal and breath sounds normal. She has no wheezes. She has no rales.   Abdominal:  Soft. Bowel sounds are normal. She exhibits no distension. There is no tenderness.   Musculoskeletal: Normal range of motion. She exhibits no edema.   Neurological: She is alert and oriented to person, place, and time.   Skin: Skin is warm and dry. No rash noted. No erythema.   Psychiatric: She has a normal mood and affect. Her behavior is normal. Judgment and thought content normal.   Vitals reviewed.      Lab Results   Component Value Date    WBC 5.36 02/26/2018    HGB 12.6 02/26/2018    HCT 37.4 02/26/2018    MCV 90 02/26/2018     02/26/2018         Chemistry        Component Value Date/Time     02/26/2018 1134    K 4.1 02/26/2018 1134     02/26/2018 1134    CO2 26 02/26/2018 1134    BUN 10 02/26/2018 1134    CREATININE 0.7 02/26/2018 1134     02/26/2018 1134        Component Value Date/Time    CALCIUM 9.3 02/26/2018 1134    ALKPHOS 157 (H) 02/26/2018 1134    AST 14 02/26/2018 1134    ALT 13 02/26/2018 1134    BILITOT 1.0 02/26/2018 1134    ESTGFRAFRICA >60.0 02/26/2018 1134    EGFRNONAA >60.0 02/26/2018 1134            Lab Results   Component Value Date    CHOL 148 02/26/2018    CHOL 234 (H) 05/08/2017    CHOL 192 11/08/2016     Lab Results   Component Value Date    HDL 46 02/26/2018    HDL 54 05/08/2017    HDL 51 11/08/2016     Lab Results   Component Value Date    LDLCALC 93.4 02/26/2018    LDLCALC 158.0 05/08/2017    LDLCALC 120.0 11/08/2016     Lab Results   Component Value Date    TRIG 43 02/26/2018    TRIG 110 05/08/2017    TRIG 105 11/08/2016     Lab Results   Component Value Date    CHOLHDL 31.1 02/26/2018    CHOLHDL 23.1 05/08/2017    CHOLHDL 26.6 11/08/2016       Lab Results   Component Value Date    TSH 1.557 02/26/2018       Lab Results   Component Value Date    HGBA1C 5.6 02/26/2018       Assessment:     1. Essential hypertension    2. Hyperlipidemia, unspecified hyperlipidemia type        Plan:

## 2019-06-17 ENCOUNTER — OFFICE VISIT (OUTPATIENT)
Dept: FAMILY MEDICINE | Facility: CLINIC | Age: 69
End: 2019-06-17
Payer: MEDICARE

## 2019-06-17 VITALS
DIASTOLIC BLOOD PRESSURE: 76 MMHG | BODY MASS INDEX: 25.55 KG/M2 | SYSTOLIC BLOOD PRESSURE: 120 MMHG | WEIGHT: 138.88 LBS | OXYGEN SATURATION: 98 % | HEIGHT: 62 IN | HEART RATE: 80 BPM

## 2019-06-17 DIAGNOSIS — Z12.31 ENCOUNTER FOR SCREENING MAMMOGRAM FOR MALIGNANT NEOPLASM OF BREAST: ICD-10-CM

## 2019-06-17 DIAGNOSIS — M54.9 UPPER BACK PAIN ON LEFT SIDE: ICD-10-CM

## 2019-06-17 DIAGNOSIS — E78.5 HYPERLIPIDEMIA, UNSPECIFIED HYPERLIPIDEMIA TYPE: ICD-10-CM

## 2019-06-17 DIAGNOSIS — Z12.11 SCREEN FOR COLON CANCER: ICD-10-CM

## 2019-06-17 DIAGNOSIS — I10 ESSENTIAL HYPERTENSION: ICD-10-CM

## 2019-06-17 DIAGNOSIS — R73.01 IMPAIRED FASTING GLUCOSE: ICD-10-CM

## 2019-06-17 DIAGNOSIS — Z00.00 ANNUAL PHYSICAL EXAM: Primary | ICD-10-CM

## 2019-06-17 PROCEDURE — 99999 PR PBB SHADOW E&M-EST. PATIENT-LVL IV: ICD-10-PCS | Mod: PBBFAC,HCNC,, | Performed by: FAMILY MEDICINE

## 2019-06-17 PROCEDURE — 3078F PR MOST RECENT DIASTOLIC BLOOD PRESSURE < 80 MM HG: ICD-10-PCS | Mod: HCNC,CPTII,S$GLB, | Performed by: FAMILY MEDICINE

## 2019-06-17 PROCEDURE — 3074F SYST BP LT 130 MM HG: CPT | Mod: HCNC,CPTII,S$GLB, | Performed by: FAMILY MEDICINE

## 2019-06-17 PROCEDURE — 3078F DIAST BP <80 MM HG: CPT | Mod: HCNC,CPTII,S$GLB, | Performed by: FAMILY MEDICINE

## 2019-06-17 PROCEDURE — 99999 PR PBB SHADOW E&M-EST. PATIENT-LVL IV: CPT | Mod: PBBFAC,HCNC,, | Performed by: FAMILY MEDICINE

## 2019-06-17 PROCEDURE — 3074F PR MOST RECENT SYSTOLIC BLOOD PRESSURE < 130 MM HG: ICD-10-PCS | Mod: HCNC,CPTII,S$GLB, | Performed by: FAMILY MEDICINE

## 2019-06-17 PROCEDURE — 99397 PR PREVENTIVE VISIT,EST,65 & OVER: ICD-10-PCS | Mod: HCNC,S$GLB,, | Performed by: FAMILY MEDICINE

## 2019-06-17 PROCEDURE — 99397 PER PM REEVAL EST PAT 65+ YR: CPT | Mod: HCNC,S$GLB,, | Performed by: FAMILY MEDICINE

## 2019-06-17 RX ORDER — AMLODIPINE AND BENAZEPRIL HYDROCHLORIDE 10; 20 MG/1; MG/1
1 CAPSULE ORAL DAILY
Qty: 90 CAPSULE | Refills: 3 | Status: SHIPPED | OUTPATIENT
Start: 2019-06-17 | End: 2020-01-03 | Stop reason: SDUPTHER

## 2019-06-17 RX ORDER — ATORVASTATIN CALCIUM 10 MG/1
10 TABLET, FILM COATED ORAL DAILY
Qty: 90 TABLET | Refills: 3 | Status: SHIPPED | OUTPATIENT
Start: 2019-06-17 | End: 2020-01-03 | Stop reason: SDUPTHER

## 2019-06-17 NOTE — PATIENT INSTRUCTIONS
Carbamide Peroxide ear solution  ¿Qué es anastasia medicamento?  La CARBAMIDA PERÓXIDA se utiliza para ablandar y ayudar a quitar la cera del oído.  ¿Cómo drew utilizar anastasia medicamento?  Anastasia medicamento se utiliza solamente en el conducto auditivo externo. Siga las instrucciones atentamente. Lávese las tabatha antes y después de usar el medicamento. Puede entibiar la solución sosteniendo el frasco en las tabatha stuart aproximadamente 1 a 2 minutos. Recuéstese con el oído infectado hacia arriba. Coloque la cantidad de gotas adecuada dentro del conducto auditivo. Luego de colocar las gotas, permanezca recostado con el oído infectado hacia arriba stuart 5 minutos para que las gotas permanezcan en el conducto auditivo. Puede insertar suavemente baron compresa de algodón en la cavidad del oído stuart no más de 5 a 10 minutos para asegurar la retención del medicamento. Si es necesario, repita el procedimiento con el otro oído. Evite que la punta del gotero toque el oído, las yemas de los dedos u otra superficie. No enjuague el gotero después de usarlo. Mantenga el envase curtis cerrado.  Hable con jeong pediatra para informarse acerca del uso de anastasia medicamento en niños. Aunque anastasia medicamento ha sido recetado a niños tan menores vladimir de 12 años de edad para condiciones selectivas, las precauciones se aplican.  ¿Qué efectos secundarios puedo tener al utilizar anastasia medicamento?  Efectos secundarios que debe informar a jeong médico o a jeong profesional de la david tan pronto vladimir sea posible:  · reacciones alérgicas vldaimir erupción cutánea, picazón o urticarias, hinchazón de la viv, labios o lengua  · ardor, picazón y enrojecimiento  · aumento del dolor de oído  · erupción  Efectos secundarios que, por lo general, no requieren atención médica (debe informarlos a jeong médico o a jeong profesional de la david si persisten o si son molestos):  · sensación anormal mientras se aplica las gotas en el oído  · reducción pasajera de la audición  (aidan no baron pérdida completa de la misma)  ¿Qué puede interactuar con mary medicamento?  No se esperan interacciones. No utilice otros productos para los oídos sin consultar a jeong médico o a jeong profesional de la david.  ¿Qué sucede si me olvido de baron dosis?  Si olvida baron dosis, aplíquela lo antes posible. Si es huang la hora de la próxima dosis, aplique sólo heidi dosis. No use dosis adicionales o dobles.  ¿Dónde drew guardar mi medicina?  Manténgala fuera del alcance de los niños.  Guárdela a temperatura ambiente, entre 15 y 30 grados C (59 y 86 grados F) en un recipiente hermético y resistente a la fidel. Guarde el frasco lejos marion solar directa y del calor excesivo. Deseche todos los medicamentos que no haya utilizado, después de la fecha de vencimiento.  ¿Qué le drew informar a mi profesional de la david antes de david mary medicamento?  Necesita saber si usted presenta alguno de los siguientes problemas o situaciones:  · mareos  · secreción en el oído  · irritación, erupción o dolor de oído  · infección  · perforación del tímpano (agujero en el tímpano)  · baron reacción alérgica o inusual a la carbamida peróxida, a la glicerina, al peróxido de hidrógeno, a otros medicamentos, alimentos, colorantes o conservantes  · si está embarazada o buscando quedar embarazada  · si está amamantando a un bebé  ¿A qué drew estar atento al usar mary medicamento?  Mary medicamento no debe usarse stuart un período de tiempo prolongado. No lo use stuart más de 4 días sin consultar a jeong profesional de david. Consulte a jeong médico o a jeong profesional de la david si jeong problema no mejora en pocos días o si presenta ardor, enrojecimiento, picazón o hinchazón.  © 3082-3846 [employer]. [address] [place] [address]. Todos los derechos reservados. Esta información no pretende sustituir la atención médica profesional. Sólo jeong médico puede diagnosticar y tratar un problema de david.

## 2019-06-17 NOTE — PROGRESS NOTES
Subjective:       Patient ID: Ines Pacheco is a 68 y.o. female.    Chief Complaint: Annual Exam    HPI  Review of Systems    Objective:      Physical Exam    Assessment:       1. Annual physical exam    2. Essential hypertension    3. Hyperlipidemia, unspecified hyperlipidemia type    4. Impaired fasting glucose    5. Upper back pain on left side    6. Screen for colon cancer    7. Encounter for screening mammogram for malignant neoplasm of breast        Plan:       ***

## 2019-06-17 NOTE — PROGRESS NOTES
Subjective:       Patient ID: Ines Pacheco is a 68 y.o. female.    Chief Complaint: Annual Exam    68 years old female came to the clinic for her physical examination.  Patient with left upper back for the last year.  The pain is 3/10 of intensity on and off aggravated with activity and better with rest.  Patient with impaired fasting glucose.  No polyuria, polydipsia or polyphagia .  Patient due for her mammogram.  Last cholesterol was normal .    Review of Systems   Constitutional: Negative.    HENT: Negative.    Eyes: Negative.    Respiratory: Negative.    Cardiovascular: Negative.  Negative for chest pain, palpitations and leg swelling.   Gastrointestinal: Negative.    Endocrine: Negative for polydipsia, polyphagia and polyuria.   Genitourinary: Negative.    Musculoskeletal: Negative.    Skin: Negative.    Neurological: Negative.    Psychiatric/Behavioral: Negative.        Objective:      Physical Exam   Constitutional: She is oriented to person, place, and time. She appears well-developed and well-nourished. No distress.   HENT:   Head: Normocephalic and atraumatic.   Right Ear: External ear normal.   Left Ear: External ear normal.   Nose: Nose normal.   Mouth/Throat: Oropharynx is clear and moist. No oropharyngeal exudate.   Eyes: Pupils are equal, round, and reactive to light. Conjunctivae and EOM are normal. Right eye exhibits no discharge. Left eye exhibits no discharge. No scleral icterus.   Neck: Normal range of motion. Neck supple. No JVD present. No tracheal deviation present. No thyromegaly present.   Cardiovascular: Normal rate, regular rhythm, normal heart sounds and intact distal pulses. Exam reveals no gallop and no friction rub.   No murmur heard.  Pulmonary/Chest: Effort normal and breath sounds normal. No stridor. No respiratory distress. She has no wheezes. She has no rales. She exhibits no tenderness.   Abdominal: Soft. Bowel sounds are normal. She exhibits no distension and no mass.  There is no tenderness. There is no rebound and no guarding.   Musculoskeletal: Normal range of motion. She exhibits no edema or tenderness.        Arms:  Lymphadenopathy:     She has no cervical adenopathy.   Neurological: She is alert and oriented to person, place, and time. She has normal reflexes. No cranial nerve deficit. She exhibits normal muscle tone. Coordination normal.   Skin: Skin is warm and dry. No rash noted. She is not diaphoretic. No erythema. No pallor.   Psychiatric: She has a normal mood and affect. Her behavior is normal. Judgment and thought content normal.       Assessment:       1. Annual physical exam    2. Essential hypertension    3. Hyperlipidemia, unspecified hyperlipidemia type    4. Impaired fasting glucose    5. Upper back pain on left side    6. Screen for colon cancer    7. Encounter for screening mammogram for malignant neoplasm of breast        Plan:         Ines was seen today for annual exam.    Diagnoses and all orders for this visit:    Annual physical exam  -     Comprehensive metabolic panel; Future  -     Lipid panel; Future  -     Urinalysis; Future  -     TSH; Future  -     CBC auto differential; Future  -     Fecal Immunochemical Test (iFOBT); Future    Essential hypertension  -     Comprehensive metabolic panel; Future  -     Lipid panel; Future  -     Urinalysis; Future  -     TSH; Future  -     CBC auto differential; Future  -     amlodipine-benazepril 10-20mg (LOTREL) 10-20 mg per capsule; Take 1 capsule by mouth once daily.    Hyperlipidemia, unspecified hyperlipidemia type  -     atorvastatin (LIPITOR) 10 MG tablet; Take 1 tablet (10 mg total) by mouth once daily.    Impaired fasting glucose  -     Hemoglobin A1c; Future    Upper back pain on left side  -     Ambulatory Referral to Physical/Occupational Therapy    Screen for colon cancer  -     Fecal Immunochemical Test (iFOBT); Future    Encounter for screening mammogram for malignant neoplasm of breast  -      Mammo Digital Screening Bilat; Future    Continue monitoring blood pressure at home, low sodium diet.

## 2019-06-19 ENCOUNTER — LAB VISIT (OUTPATIENT)
Dept: LAB | Facility: HOSPITAL | Age: 69
End: 2019-06-19
Attending: FAMILY MEDICINE
Payer: MEDICARE

## 2019-06-19 DIAGNOSIS — Z00.00 ANNUAL PHYSICAL EXAM: ICD-10-CM

## 2019-06-19 DIAGNOSIS — R73.01 IMPAIRED FASTING GLUCOSE: ICD-10-CM

## 2019-06-19 DIAGNOSIS — I10 ESSENTIAL HYPERTENSION: ICD-10-CM

## 2019-06-19 LAB
ALBUMIN SERPL BCP-MCNC: 4.1 G/DL (ref 3.5–5.2)
ALP SERPL-CCNC: 130 U/L (ref 55–135)
ALT SERPL W/O P-5'-P-CCNC: 14 U/L (ref 10–44)
ANION GAP SERPL CALC-SCNC: 9 MMOL/L (ref 8–16)
AST SERPL-CCNC: 15 U/L (ref 10–40)
BASOPHILS # BLD AUTO: 0.03 K/UL (ref 0–0.2)
BASOPHILS NFR BLD: 0.5 % (ref 0–1.9)
BILIRUB SERPL-MCNC: 1 MG/DL (ref 0.1–1)
BUN SERPL-MCNC: 9 MG/DL (ref 8–23)
CALCIUM SERPL-MCNC: 9.5 MG/DL (ref 8.7–10.5)
CHLORIDE SERPL-SCNC: 105 MMOL/L (ref 95–110)
CHOLEST SERPL-MCNC: 181 MG/DL (ref 120–199)
CHOLEST/HDLC SERPL: 4 {RATIO} (ref 2–5)
CO2 SERPL-SCNC: 28 MMOL/L (ref 23–29)
CREAT SERPL-MCNC: 0.7 MG/DL (ref 0.5–1.4)
DIFFERENTIAL METHOD: NORMAL
EOSINOPHIL # BLD AUTO: 0.2 K/UL (ref 0–0.5)
EOSINOPHIL NFR BLD: 2.7 % (ref 0–8)
ERYTHROCYTE [DISTWIDTH] IN BLOOD BY AUTOMATED COUNT: 13.2 % (ref 11.5–14.5)
EST. GFR  (AFRICAN AMERICAN): >60 ML/MIN/1.73 M^2
EST. GFR  (NON AFRICAN AMERICAN): >60 ML/MIN/1.73 M^2
ESTIMATED AVG GLUCOSE: 120 MG/DL (ref 68–131)
GLUCOSE SERPL-MCNC: 98 MG/DL (ref 70–110)
HBA1C MFR BLD HPLC: 5.8 % (ref 4–5.6)
HCT VFR BLD AUTO: 40.8 % (ref 37–48.5)
HDLC SERPL-MCNC: 45 MG/DL (ref 40–75)
HDLC SERPL: 24.9 % (ref 20–50)
HGB BLD-MCNC: 13.4 G/DL (ref 12–16)
IMM GRANULOCYTES # BLD AUTO: 0.01 K/UL (ref 0–0.04)
IMM GRANULOCYTES NFR BLD AUTO: 0.2 % (ref 0–0.5)
LDLC SERPL CALC-MCNC: 119.4 MG/DL (ref 63–159)
LYMPHOCYTES # BLD AUTO: 2.4 K/UL (ref 1–4.8)
LYMPHOCYTES NFR BLD: 40.3 % (ref 18–48)
MCH RBC QN AUTO: 29.5 PG (ref 27–31)
MCHC RBC AUTO-ENTMCNC: 32.8 G/DL (ref 32–36)
MCV RBC AUTO: 90 FL (ref 82–98)
MONOCYTES # BLD AUTO: 0.5 K/UL (ref 0.3–1)
MONOCYTES NFR BLD: 8.7 % (ref 4–15)
NEUTROPHILS # BLD AUTO: 2.9 K/UL (ref 1.8–7.7)
NEUTROPHILS NFR BLD: 47.6 % (ref 38–73)
NONHDLC SERPL-MCNC: 136 MG/DL
NRBC BLD-RTO: 0 /100 WBC
PLATELET # BLD AUTO: 241 K/UL (ref 150–350)
PMV BLD AUTO: 11.3 FL (ref 9.2–12.9)
POTASSIUM SERPL-SCNC: 3.9 MMOL/L (ref 3.5–5.1)
PROT SERPL-MCNC: 6.8 G/DL (ref 6–8.4)
RBC # BLD AUTO: 4.55 M/UL (ref 4–5.4)
SODIUM SERPL-SCNC: 142 MMOL/L (ref 136–145)
TRIGL SERPL-MCNC: 83 MG/DL (ref 30–150)
TSH SERPL DL<=0.005 MIU/L-ACNC: 3.59 UIU/ML (ref 0.4–4)
WBC # BLD AUTO: 6 K/UL (ref 3.9–12.7)

## 2019-06-19 PROCEDURE — 85025 COMPLETE CBC W/AUTO DIFF WBC: CPT | Mod: HCNC

## 2019-06-19 PROCEDURE — 83036 HEMOGLOBIN GLYCOSYLATED A1C: CPT | Mod: HCNC

## 2019-06-19 PROCEDURE — 80053 COMPREHEN METABOLIC PANEL: CPT | Mod: HCNC

## 2019-06-19 PROCEDURE — 84443 ASSAY THYROID STIM HORMONE: CPT | Mod: HCNC

## 2019-06-19 PROCEDURE — 36415 COLL VENOUS BLD VENIPUNCTURE: CPT | Mod: HCNC,PO

## 2019-06-19 PROCEDURE — 80061 LIPID PANEL: CPT | Mod: HCNC

## 2019-06-21 ENCOUNTER — PATIENT MESSAGE (OUTPATIENT)
Dept: FAMILY MEDICINE | Facility: CLINIC | Age: 69
End: 2019-06-21

## 2019-06-25 ENCOUNTER — TELEPHONE (OUTPATIENT)
Dept: FAMILY MEDICINE | Facility: CLINIC | Age: 69
End: 2019-06-25

## 2019-06-25 NOTE — TELEPHONE ENCOUNTER
----- Message from Michelle Espinoza sent at 6/25/2019  3:33 PM CDT -----  Contact:  [phone] or [phone]  Patient would like to speak with you about being seen as soon as possible to clean out her ears because she is still having problems since the last visit . Please advise

## 2019-06-30 ENCOUNTER — PATIENT MESSAGE (OUTPATIENT)
Dept: FAMILY MEDICINE | Facility: CLINIC | Age: 69
End: 2019-06-30

## 2019-07-02 ENCOUNTER — OFFICE VISIT (OUTPATIENT)
Dept: FAMILY MEDICINE | Facility: CLINIC | Age: 69
End: 2019-07-02
Payer: MEDICARE

## 2019-07-02 VITALS
WEIGHT: 138.88 LBS | HEIGHT: 62 IN | HEART RATE: 84 BPM | DIASTOLIC BLOOD PRESSURE: 70 MMHG | SYSTOLIC BLOOD PRESSURE: 130 MMHG | OXYGEN SATURATION: 98 % | BODY MASS INDEX: 25.55 KG/M2

## 2019-07-02 DIAGNOSIS — R73.03 PREDIABETES: ICD-10-CM

## 2019-07-02 DIAGNOSIS — I10 ESSENTIAL HYPERTENSION: Primary | ICD-10-CM

## 2019-07-02 DIAGNOSIS — H61.23 BILATERAL IMPACTED CERUMEN: ICD-10-CM

## 2019-07-02 PROCEDURE — 3078F DIAST BP <80 MM HG: CPT | Mod: HCNC,CPTII,S$GLB, | Performed by: FAMILY MEDICINE

## 2019-07-02 PROCEDURE — 1101F PT FALLS ASSESS-DOCD LE1/YR: CPT | Mod: HCNC,CPTII,S$GLB, | Performed by: FAMILY MEDICINE

## 2019-07-02 PROCEDURE — 99999 PR PBB SHADOW E&M-EST. PATIENT-LVL III: ICD-10-PCS | Mod: PBBFAC,HCNC,, | Performed by: FAMILY MEDICINE

## 2019-07-02 PROCEDURE — 1101F PR PT FALLS ASSESS DOC 0-1 FALLS W/OUT INJ PAST YR: ICD-10-PCS | Mod: HCNC,CPTII,S$GLB, | Performed by: FAMILY MEDICINE

## 2019-07-02 PROCEDURE — 99214 OFFICE O/P EST MOD 30 MIN: CPT | Mod: HCNC,S$GLB,, | Performed by: FAMILY MEDICINE

## 2019-07-02 PROCEDURE — 99999 PR PBB SHADOW E&M-EST. PATIENT-LVL III: CPT | Mod: PBBFAC,HCNC,, | Performed by: FAMILY MEDICINE

## 2019-07-02 PROCEDURE — 3075F PR MOST RECENT SYSTOLIC BLOOD PRESS GE 130-139MM HG: ICD-10-PCS | Mod: HCNC,CPTII,S$GLB, | Performed by: FAMILY MEDICINE

## 2019-07-02 PROCEDURE — 3075F SYST BP GE 130 - 139MM HG: CPT | Mod: HCNC,CPTII,S$GLB, | Performed by: FAMILY MEDICINE

## 2019-07-02 PROCEDURE — 99214 PR OFFICE/OUTPT VISIT, EST, LEVL IV, 30-39 MIN: ICD-10-PCS | Mod: HCNC,S$GLB,, | Performed by: FAMILY MEDICINE

## 2019-07-02 PROCEDURE — 3078F PR MOST RECENT DIASTOLIC BLOOD PRESSURE < 80 MM HG: ICD-10-PCS | Mod: HCNC,CPTII,S$GLB, | Performed by: FAMILY MEDICINE

## 2019-07-02 NOTE — PROGRESS NOTES
Subjective:       Patient ID: Ines Pacheco is a 68 y.o. female.    Chief Complaint: Ear Fullness    68 years old female came to the clinic with both ear cerumen buildup for the last couple of weeks.  Pressure today stable.  No Chest pain, palpitation orthopnea or PND.  Patient with slightly elevated A1c.  Patient wants to continue lifestyle changes only.  No metformin right now.    Review of Systems   Constitutional: Negative.    HENT: Negative.    Eyes: Negative.    Respiratory: Negative.    Cardiovascular: Negative.  Negative for chest pain, palpitations and leg swelling.   Gastrointestinal: Negative.    Endocrine: Negative for polydipsia, polyphagia and polyuria.   Genitourinary: Negative.    Musculoskeletal: Negative.    Skin: Negative.    Neurological: Negative.    Psychiatric/Behavioral: Negative.        Objective:      Physical Exam   Constitutional: She is oriented to person, place, and time. She appears well-developed and well-nourished. No distress.   HENT:   Head: Normocephalic and atraumatic.   Ears:    Nose: Nose normal.   Mouth/Throat: Oropharynx is clear and moist. No oropharyngeal exudate.   Eyes: Pupils are equal, round, and reactive to light. Conjunctivae and EOM are normal. Right eye exhibits no discharge. Left eye exhibits no discharge. No scleral icterus.   Neck: Normal range of motion. Neck supple. No JVD present. No tracheal deviation present. No thyromegaly present.   Cardiovascular: Normal rate, regular rhythm, normal heart sounds and intact distal pulses. Exam reveals no gallop and no friction rub.   No murmur heard.  Pulmonary/Chest: Effort normal and breath sounds normal. No stridor. No respiratory distress. She has no wheezes. She has no rales. She exhibits no tenderness.   Abdominal: Soft. Bowel sounds are normal. She exhibits no distension and no mass. There is no tenderness. There is no rebound and no guarding.   Musculoskeletal: Normal range of motion. She exhibits no edema or  tenderness.   Lymphadenopathy:     She has no cervical adenopathy.   Neurological: She is alert and oriented to person, place, and time. She has normal reflexes. No cranial nerve deficit. She exhibits normal muscle tone. Coordination normal.   Skin: Skin is warm and dry. No rash noted. She is not diaphoretic. No erythema. No pallor.   Psychiatric: She has a normal mood and affect. Her behavior is normal. Judgment and thought content normal.       Assessment:       1. Essential hypertension    2. Prediabetes    3. Bilateral impacted cerumen        Plan:         Ines was seen today for ear fullness.    Diagnoses and all orders for this visit:    Essential hypertension  -     CBC auto differential; Future  -     Cancel: Comprehensive metabolic panel; Future  -     Cancel: Lipid panel; Future  -     Comprehensive metabolic panel; Future  -     Lipid panel; Future    Prediabetes  -     Cancel: Comprehensive metabolic panel; Future  -     Cancel: Hemoglobin A1c; Future  -     Comprehensive metabolic panel; Future  -     Lipid panel; Future  -     Hemoglobin A1c; Future    Bilateral impacted cerumen  -     Ear wax removal    Continue monitoring blood pressure at home, low sodium diet.

## 2019-07-05 ENCOUNTER — PES CALL (OUTPATIENT)
Dept: ADMINISTRATIVE | Facility: CLINIC | Age: 69
End: 2019-07-05

## 2019-07-08 ENCOUNTER — OFFICE VISIT (OUTPATIENT)
Dept: FAMILY MEDICINE | Facility: CLINIC | Age: 69
End: 2019-07-08
Attending: FAMILY MEDICINE
Payer: MEDICARE

## 2019-07-08 VITALS
OXYGEN SATURATION: 97 % | BODY MASS INDEX: 25.4 KG/M2 | TEMPERATURE: 98 F | HEART RATE: 78 BPM | WEIGHT: 138 LBS | DIASTOLIC BLOOD PRESSURE: 80 MMHG | SYSTOLIC BLOOD PRESSURE: 152 MMHG | HEIGHT: 62 IN

## 2019-07-08 DIAGNOSIS — H61.23 BILATERAL IMPACTED CERUMEN: Primary | ICD-10-CM

## 2019-07-08 DIAGNOSIS — H91.93 BILATERAL HEARING LOSS, UNSPECIFIED HEARING LOSS TYPE: ICD-10-CM

## 2019-07-08 PROCEDURE — 99999 PR PBB SHADOW E&M-EST. PATIENT-LVL III: ICD-10-PCS | Mod: PBBFAC,HCNC,, | Performed by: FAMILY MEDICINE

## 2019-07-08 PROCEDURE — 1101F PT FALLS ASSESS-DOCD LE1/YR: CPT | Mod: HCNC,CPTII,S$GLB, | Performed by: FAMILY MEDICINE

## 2019-07-08 PROCEDURE — 3077F PR MOST RECENT SYSTOLIC BLOOD PRESSURE >= 140 MM HG: ICD-10-PCS | Mod: HCNC,CPTII,S$GLB, | Performed by: FAMILY MEDICINE

## 2019-07-08 PROCEDURE — 1101F PR PT FALLS ASSESS DOC 0-1 FALLS W/OUT INJ PAST YR: ICD-10-PCS | Mod: HCNC,CPTII,S$GLB, | Performed by: FAMILY MEDICINE

## 2019-07-08 PROCEDURE — 99214 OFFICE O/P EST MOD 30 MIN: CPT | Mod: HCNC,S$GLB,, | Performed by: FAMILY MEDICINE

## 2019-07-08 PROCEDURE — 3077F SYST BP >= 140 MM HG: CPT | Mod: HCNC,CPTII,S$GLB, | Performed by: FAMILY MEDICINE

## 2019-07-08 PROCEDURE — 3079F PR MOST RECENT DIASTOLIC BLOOD PRESSURE 80-89 MM HG: ICD-10-PCS | Mod: HCNC,CPTII,S$GLB, | Performed by: FAMILY MEDICINE

## 2019-07-08 PROCEDURE — 99999 PR PBB SHADOW E&M-EST. PATIENT-LVL III: CPT | Mod: PBBFAC,HCNC,, | Performed by: FAMILY MEDICINE

## 2019-07-08 PROCEDURE — 3079F DIAST BP 80-89 MM HG: CPT | Mod: HCNC,CPTII,S$GLB, | Performed by: FAMILY MEDICINE

## 2019-07-08 PROCEDURE — 99214 PR OFFICE/OUTPT VISIT, EST, LEVL IV, 30-39 MIN: ICD-10-PCS | Mod: HCNC,S$GLB,, | Performed by: FAMILY MEDICINE

## 2019-07-08 NOTE — PATIENT INSTRUCTIONS
Earwax Removal    The ear canal makes earwax from the canals lining. The ears make wax to lubricate and protect the ear canal. The ear canal is the tube that connects the middle ear to the outside of the ear. The wax protects the ear from bacteria, infection, and damage from water or trauma.  The wax that forms in the canal naturally moves toward the outside of the ear and falls out. In some cases, the ear may make too much wax. If the wax causes problems or keeps the healthcare provider from seeing into the ear, the extra wax may be removed.  Too much wax can affect your hearing. It can cause itching. In rare cases, it can be painful. Earwax should not be removed unless it is causing a problem. You should not stick objects into your ear to remove wax unless told to do so by your healthcare provider.  Healthcare providers can remove earwax safely. It is important to stay still during the procedure to avoid damage to the ear canal. But removing earwax generally doesnt hurt. You will not usually need anesthesia or pain medicine when the provider removes the earwax.  A number of conditions lead to earwax buildup. These include some skin problems, a narrow ear canal, or ears that make too much earwax. Using cotton swabs in the canal pushes earwax deeper into the ear and contributes to the buildup of earwax.  Home care  · The healthcare provider may recommend mineral oil or an over-the-counter eardrop to use at home to soften the earwax. Use these products only if the provider recommends them. Use these products only if the provider recommends them. Carefully follow the instructions given.  · Dont use mineral oil or OTC eardrops if you might have an ear infection or a ruptured eardrum. Tell your healthcare provider right away if you have diabetes or an immune disorder.  · Dont use cotton swabs in your ears. Cotton swabs may push wax deeper into the ear canal or damage the eardrum. Use cotton gauze or a wet  washcloth  to gently remove wax on the outside of the ear and around the opening to the ear canal.  · Don't use any probing device or object such as cotton-tipped swabs or anthony pins to clean the inside of your ears.  · Dont use ear candles to clean your ears. Candling can be dangerous. It can burn the ear canal. It can also make the condition worse instead of better.  · Dont use cold water to rinse the ear. This will make you dizzy. If your provider tells you to rinse your ear, use only warm water or follow his or her instructions.  · Check the ear for signs of infection or irritation listed below under When to seek medical advice.  Steps for using eardrops  1. Warm the medicine bottle by rubbing it between your hands for a few minutes.  2. Lie down on your side, with the affected ear up.  3. Place the recommended number of drops in the ear. Wet a cotton ball with the medicine. Gently put the cotton ball into the ear opening.  Follow-up care  Follow up with your healthcare provider, or as directed.  When to seek medical advice  Call the provider right away if you have:  · Ear pain that gets worse  · Fever of 100.4F°F (38°C) or higher, or as directed by your healthcare provider  · Worsening wax buildup  · Severe pain, dizziness, or nausea  · Bleeding from the ear  · Hearing problems  · Signs of irritation from the eardrops, such as burning, stinging, or swelling and tenderness  · Foul-smelling fluid draining from the ear  · Swelling, redness, or tenderness of the outer ear  · Headache, neck pain, or stiff neck  Date Last Reviewed: 3/22/2015  © 7034-1067 [employer]. [address] [place] [address], [place] [address]. All rights reserved. This information is not intended as a substitute for professional medical care. Always follow your healthcare professional's instructions.

## 2019-07-08 NOTE — PROGRESS NOTES
Subjective:       Patient ID: Ines Pacheco is a 68 y.o. female.    Chief Complaint: Otalgia (left x 1week) and Dizziness    68 yr old  female with HTN, HLD presents today for urgent care visit complaining of left earache and fullness. She has hearing loss as well. She c/o mild dizzy spell but it is much better now. No other symptoms of URI. Details as follows -        Otalgia    There is pain in the left ear. This is a new problem. The current episode started 1 to 4 weeks ago. The problem occurs constantly. The problem has been gradually worsening. There has been no fever. The pain is at a severity of 3/10. The pain is mild. Associated symptoms include hearing loss. Pertinent negatives include no abdominal pain, coughing, ear discharge, headaches, rash, rhinorrhea, sore throat or vomiting. She has tried ear drops for the symptoms. The treatment provided no relief. There is no history of a chronic ear infection, hearing loss or a tympanostomy tube.     Review of Systems   Constitutional: Negative.  Negative for activity change, diaphoresis and unexpected weight change.   HENT: Positive for ear pain and hearing loss. Negative for congestion, ear discharge, rhinorrhea, sore throat and voice change.    Eyes: Negative.  Negative for pain, discharge and visual disturbance.   Respiratory: Negative.  Negative for cough, chest tightness, shortness of breath and wheezing.    Cardiovascular: Negative.  Negative for chest pain.   Gastrointestinal: Negative.  Negative for abdominal distention, abdominal pain, anal bleeding, constipation, nausea and vomiting.   Endocrine: Negative.  Negative for cold intolerance, polydipsia and polyuria.   Genitourinary: Negative.  Negative for decreased urine volume, difficulty urinating, dysuria, frequency, menstrual problem and vaginal pain.   Musculoskeletal: Negative.  Negative for arthralgias, gait problem and myalgias.   Skin: Negative.  Negative for color change, pallor,  rash and wound.   Allergic/Immunologic: Negative.  Negative for environmental allergies and immunocompromised state.   Neurological: Negative.  Negative for dizziness, tremors, seizures, speech difficulty and headaches.   Hematological: Negative.  Negative for adenopathy. Does not bruise/bleed easily.   Psychiatric/Behavioral: Negative.  Negative for agitation, confusion, decreased concentration, hallucinations, self-injury and suicidal ideas. The patient is not nervous/anxious.        PMH/PSH/FH/SH/MED/ALLERGY reviewed    Objective:       Vitals:    07/08/19 1702   BP: (!) 152/80   Pulse: 78   Temp: 98.3 °F (36.8 °C)       Physical Exam   Constitutional: She is oriented to person, place, and time. She appears well-developed and well-nourished. No distress.   HENT:   Head: Normocephalic and atraumatic.   Nose: Nose normal.   Mouth/Throat: Oropharynx is clear and moist. No oropharyngeal exudate.   B/L CERUMEN IMPACTION   Eyes: Pupils are equal, round, and reactive to light. Conjunctivae and EOM are normal. Right eye exhibits no discharge. Left eye exhibits no discharge. No scleral icterus.   Neck: Normal range of motion. Neck supple. No JVD present. No tracheal deviation present. No thyromegaly present.   Cardiovascular: Normal rate, regular rhythm, normal heart sounds and intact distal pulses. Exam reveals no gallop and no friction rub.   No murmur heard.  Pulmonary/Chest: Effort normal and breath sounds normal. No stridor. She has no wheezes. She has no rales. She exhibits no tenderness.   Abdominal: Soft. Bowel sounds are normal. She exhibits no distension and no mass. There is no tenderness. There is no rebound and no guarding. No hernia.   Musculoskeletal: Normal range of motion. She exhibits no edema or tenderness.   Lymphadenopathy:     She has no cervical adenopathy.   Neurological: She is alert and oriented to person, place, and time. She has normal reflexes. She displays normal reflexes. No cranial nerve  deficit. She exhibits normal muscle tone. Coordination normal.   Skin: Skin is warm and dry. No rash noted. She is not diaphoretic. No erythema. No pallor.   Psychiatric: She has a normal mood and affect. Her behavior is normal. Judgment and thought content normal.       Assessment:       1. Bilateral impacted cerumen    2. Bilateral hearing loss, unspecified hearing loss type        Plan:         .Ines was seen today for otalgia and dizziness.    Diagnoses and all orders for this visit:    Bilateral impacted cerumen    Bilateral hearing loss, unspecified hearing loss type      B/L CERUMEN IMPACTION/HEARING LOSS  -ADVISED DEBROX EAR DROPS AND RETURN IN 10 DAYS FOR REMOVAL    Spent adequate time in obtaining history and explaining differentials    40 minutes spent during this visit of which greater than 50% devoted to face-face counseling and coordination of care regarding diagnosis and management plan    RTC PRN WORSENING

## 2019-07-10 ENCOUNTER — OFFICE VISIT (OUTPATIENT)
Dept: OPTOMETRY | Facility: CLINIC | Age: 69
End: 2019-07-10
Payer: MEDICARE

## 2019-07-10 DIAGNOSIS — H11.31 CONJUNCTIVAL HEMORRHAGE, RIGHT: Primary | ICD-10-CM

## 2019-07-10 DIAGNOSIS — H52.203 ASTIGMATISM OF BOTH EYES WITH PRESBYOPIA: ICD-10-CM

## 2019-07-10 DIAGNOSIS — Z13.5 GLAUCOMA SCREENING: ICD-10-CM

## 2019-07-10 DIAGNOSIS — H52.4 ASTIGMATISM OF BOTH EYES WITH PRESBYOPIA: ICD-10-CM

## 2019-07-10 PROCEDURE — 92014 PR EYE EXAM, EST PATIENT,COMPREHESV: ICD-10-PCS | Mod: HCNC,S$GLB,, | Performed by: OPTOMETRIST

## 2019-07-10 PROCEDURE — 99999 PR PBB SHADOW E&M-EST. PATIENT-LVL II: ICD-10-PCS | Mod: PBBFAC,HCNC,, | Performed by: OPTOMETRIST

## 2019-07-10 PROCEDURE — 92015 DETERMINE REFRACTIVE STATE: CPT | Mod: HCNC,S$GLB,, | Performed by: OPTOMETRIST

## 2019-07-10 PROCEDURE — 92014 COMPRE OPH EXAM EST PT 1/>: CPT | Mod: HCNC,S$GLB,, | Performed by: OPTOMETRIST

## 2019-07-10 PROCEDURE — 99999 PR PBB SHADOW E&M-EST. PATIENT-LVL II: CPT | Mod: PBBFAC,HCNC,, | Performed by: OPTOMETRIST

## 2019-07-10 PROCEDURE — 92015 PR REFRACTION: ICD-10-PCS | Mod: HCNC,S$GLB,, | Performed by: OPTOMETRIST

## 2019-07-10 NOTE — PROGRESS NOTES
HPI     DLS: 6/18/18  Pt  states the pt woke up yesterday morning with her right eye red,   pt states she had some pain in it last night but none today. No discharge   for the right eye.   No f/f  Refresh gtts once daily    Last edited by Gatito Arora, OD on 7/10/2019  9:30 AM. (History)        ROS     Positive for: Eyes (cat surgery OU)    Negative for: Constitutional, Gastrointestinal, Neurological, Skin,   Genitourinary, Musculoskeletal, HENT, Endocrine, Cardiovascular,   Respiratory, Psychiatric, Allergic/Imm, Heme/Lymph    Last edited by Gatito Arora, OD on 7/10/2019  9:30 AM. (History)        Assessment /Plan     For exam results, see Encounter Report.    Conjunctival hemorrhage, right    Glaucoma screening    Astigmatism of both eyes with presbyopia      1. Mild pco sp pciol OU--pt happy w spex  2. Dry eye sx--advised SYS COMPLETE ATs QID  3. Subconjunctival Heme OD.  Without foreign body noted.  Pt reassurance given about resolution.  Advised ATs QID     PLAN:    rtc 1 yr

## 2019-07-18 ENCOUNTER — OFFICE VISIT (OUTPATIENT)
Dept: FAMILY MEDICINE | Facility: CLINIC | Age: 69
End: 2019-07-18
Payer: MEDICARE

## 2019-07-18 VITALS
HEART RATE: 91 BPM | BODY MASS INDEX: 25.15 KG/M2 | OXYGEN SATURATION: 99 % | DIASTOLIC BLOOD PRESSURE: 78 MMHG | WEIGHT: 136.69 LBS | HEIGHT: 62 IN | SYSTOLIC BLOOD PRESSURE: 120 MMHG

## 2019-07-18 DIAGNOSIS — I10 ESSENTIAL HYPERTENSION: ICD-10-CM

## 2019-07-18 DIAGNOSIS — H61.22 IMPACTED CERUMEN OF LEFT EAR: ICD-10-CM

## 2019-07-18 DIAGNOSIS — H91.93 BILATERAL HEARING LOSS, UNSPECIFIED HEARING LOSS TYPE: Primary | ICD-10-CM

## 2019-07-18 PROCEDURE — 3074F PR MOST RECENT SYSTOLIC BLOOD PRESSURE < 130 MM HG: ICD-10-PCS | Mod: HCNC,CPTII,S$GLB, | Performed by: FAMILY MEDICINE

## 2019-07-18 PROCEDURE — 3078F PR MOST RECENT DIASTOLIC BLOOD PRESSURE < 80 MM HG: ICD-10-PCS | Mod: HCNC,CPTII,S$GLB, | Performed by: FAMILY MEDICINE

## 2019-07-18 PROCEDURE — 3078F DIAST BP <80 MM HG: CPT | Mod: HCNC,CPTII,S$GLB, | Performed by: FAMILY MEDICINE

## 2019-07-18 PROCEDURE — 3074F SYST BP LT 130 MM HG: CPT | Mod: HCNC,CPTII,S$GLB, | Performed by: FAMILY MEDICINE

## 2019-07-18 PROCEDURE — 1101F PR PT FALLS ASSESS DOC 0-1 FALLS W/OUT INJ PAST YR: ICD-10-PCS | Mod: HCNC,CPTII,S$GLB, | Performed by: FAMILY MEDICINE

## 2019-07-18 PROCEDURE — 99999 PR PBB SHADOW E&M-EST. PATIENT-LVL III: CPT | Mod: PBBFAC,HCNC,, | Performed by: FAMILY MEDICINE

## 2019-07-18 PROCEDURE — 99214 PR OFFICE/OUTPT VISIT, EST, LEVL IV, 30-39 MIN: ICD-10-PCS | Mod: HCNC,S$GLB,, | Performed by: FAMILY MEDICINE

## 2019-07-18 PROCEDURE — 1101F PT FALLS ASSESS-DOCD LE1/YR: CPT | Mod: HCNC,CPTII,S$GLB, | Performed by: FAMILY MEDICINE

## 2019-07-18 PROCEDURE — 99214 OFFICE O/P EST MOD 30 MIN: CPT | Mod: HCNC,S$GLB,, | Performed by: FAMILY MEDICINE

## 2019-07-18 PROCEDURE — 99999 PR PBB SHADOW E&M-EST. PATIENT-LVL III: ICD-10-PCS | Mod: PBBFAC,HCNC,, | Performed by: FAMILY MEDICINE

## 2019-07-18 NOTE — PROGRESS NOTES
Subjective:       Patient ID: Ines Pacheco is a 68 y.o. female.    Chief Complaint: Ear Fullness    68 years old female came to the clinic with left ear cerumen impacted for the last week.  Patient was using debrox with no significant improvement.  Blood pressure today stable.  No chest pain, palpitation, orthopnea or PND.  Patient reports hearing loss.    Review of Systems   Constitutional: Negative.    HENT: Positive for hearing loss.    Eyes: Negative.    Respiratory: Negative.    Cardiovascular: Negative.    Gastrointestinal: Negative.    Genitourinary: Negative.    Musculoskeletal: Negative.    Skin: Negative.    Neurological: Negative.    Psychiatric/Behavioral: Negative.        Objective:      Physical Exam   Constitutional: She is oriented to person, place, and time. She appears well-developed and well-nourished. No distress.   HENT:   Head: Normocephalic and atraumatic.   Right Ear: External ear normal.   Ears:    Nose: Nose normal.   Mouth/Throat: Oropharynx is clear and moist. No oropharyngeal exudate.   Eyes: Pupils are equal, round, and reactive to light. Conjunctivae and EOM are normal. Right eye exhibits no discharge. Left eye exhibits no discharge. No scleral icterus.   Neck: Normal range of motion. Neck supple. No JVD present. No tracheal deviation present. No thyromegaly present.   Cardiovascular: Normal rate, regular rhythm, normal heart sounds and intact distal pulses. Exam reveals no gallop and no friction rub.   No murmur heard.  Pulmonary/Chest: Effort normal and breath sounds normal. No stridor. No respiratory distress. She has no wheezes. She has no rales. She exhibits no tenderness.   Abdominal: Soft. Bowel sounds are normal. She exhibits no distension and no mass. There is no tenderness. There is no rebound and no guarding.   Musculoskeletal: Normal range of motion. She exhibits no edema or tenderness.   Lymphadenopathy:     She has no cervical adenopathy.   Neurological: She is  alert and oriented to person, place, and time. She has normal reflexes. No cranial nerve deficit. She exhibits normal muscle tone. Coordination normal.   Skin: Skin is warm and dry. No rash noted. She is not diaphoretic. No erythema. No pallor.   Psychiatric: She has a normal mood and affect. Her behavior is normal. Judgment and thought content normal.       Assessment:       1. Bilateral hearing loss, unspecified hearing loss type    2. Impacted cerumen of left ear    3. Essential hypertension        Plan:         Ines was seen today for ear fullness.    Diagnoses and all orders for this visit:    Bilateral hearing loss, unspecified hearing loss type  -     Ambulatory Referral to ENT    Impacted cerumen of left ear  -     Ambulatory Referral to ENT    Essential hypertension    Continue monitoring blood pressure at home, low sodium diet.

## 2019-07-22 ENCOUNTER — TELEPHONE (OUTPATIENT)
Dept: FAMILY MEDICINE | Facility: CLINIC | Age: 69
End: 2019-07-22

## 2019-07-22 DIAGNOSIS — H91.90 HEARING LOSS, UNSPECIFIED HEARING LOSS TYPE, UNSPECIFIED LATERALITY: Primary | ICD-10-CM

## 2019-07-22 NOTE — TELEPHONE ENCOUNTER
----- Message from Justine Simon sent at 7/20/2019 12:47 PM CDT -----  LISY    Please be so kind to put in an orders for audiology for pt. Pt has an appt for July 26, 2019.       Thank you!

## 2019-07-26 ENCOUNTER — CLINICAL SUPPORT (OUTPATIENT)
Dept: AUDIOLOGY | Facility: CLINIC | Age: 69
End: 2019-07-26
Payer: MEDICARE

## 2019-07-26 ENCOUNTER — OFFICE VISIT (OUTPATIENT)
Dept: OTOLARYNGOLOGY | Facility: CLINIC | Age: 69
End: 2019-07-26
Payer: MEDICARE

## 2019-07-26 VITALS
WEIGHT: 136 LBS | DIASTOLIC BLOOD PRESSURE: 85 MMHG | BODY MASS INDEX: 24.87 KG/M2 | SYSTOLIC BLOOD PRESSURE: 149 MMHG | HEART RATE: 84 BPM

## 2019-07-26 DIAGNOSIS — H90.3 SENSORINEURAL HEARING LOSS (SNHL) OF BOTH EARS: ICD-10-CM

## 2019-07-26 DIAGNOSIS — H91.90 HEARING LOSS, UNSPECIFIED HEARING LOSS TYPE, UNSPECIFIED LATERALITY: ICD-10-CM

## 2019-07-26 DIAGNOSIS — H90.3 SENSORINEURAL HEARING LOSS, BILATERAL: Primary | ICD-10-CM

## 2019-07-26 DIAGNOSIS — H61.23 BILATERAL IMPACTED CERUMEN: Primary | ICD-10-CM

## 2019-07-26 PROCEDURE — 99203 PR OFFICE/OUTPT VISIT, NEW, LEVL III, 30-44 MIN: ICD-10-PCS | Mod: 25,HCNC,S$GLB, | Performed by: NURSE PRACTITIONER

## 2019-07-26 PROCEDURE — 99999 PR PBB SHADOW E&M-EST. PATIENT-LVL III: CPT | Mod: PBBFAC,HCNC,, | Performed by: NURSE PRACTITIONER

## 2019-07-26 PROCEDURE — 3077F SYST BP >= 140 MM HG: CPT | Mod: HCNC,CPTII,S$GLB, | Performed by: NURSE PRACTITIONER

## 2019-07-26 PROCEDURE — 3079F PR MOST RECENT DIASTOLIC BLOOD PRESSURE 80-89 MM HG: ICD-10-PCS | Mod: HCNC,CPTII,S$GLB, | Performed by: NURSE PRACTITIONER

## 2019-07-26 PROCEDURE — 3077F PR MOST RECENT SYSTOLIC BLOOD PRESSURE >= 140 MM HG: ICD-10-PCS | Mod: HCNC,CPTII,S$GLB, | Performed by: NURSE PRACTITIONER

## 2019-07-26 PROCEDURE — G0268 REMOVAL OF IMPACTED WAX MD: HCPCS | Mod: HCNC,S$GLB,, | Performed by: NURSE PRACTITIONER

## 2019-07-26 PROCEDURE — 1101F PT FALLS ASSESS-DOCD LE1/YR: CPT | Mod: HCNC,CPTII,S$GLB, | Performed by: NURSE PRACTITIONER

## 2019-07-26 PROCEDURE — 99203 OFFICE O/P NEW LOW 30 MIN: CPT | Mod: 25,HCNC,S$GLB, | Performed by: NURSE PRACTITIONER

## 2019-07-26 PROCEDURE — 3079F DIAST BP 80-89 MM HG: CPT | Mod: HCNC,CPTII,S$GLB, | Performed by: NURSE PRACTITIONER

## 2019-07-26 PROCEDURE — 92553 PR AUDIOMETRY, AIR & BONE: ICD-10-PCS | Mod: HCNC,S$GLB,, | Performed by: AUDIOLOGIST

## 2019-07-26 PROCEDURE — 99999 PR PBB SHADOW E&M-EST. PATIENT-LVL I: CPT | Mod: PBBFAC,HCNC,,

## 2019-07-26 PROCEDURE — 99999 PR PBB SHADOW E&M-EST. PATIENT-LVL III: ICD-10-PCS | Mod: PBBFAC,HCNC,, | Performed by: NURSE PRACTITIONER

## 2019-07-26 PROCEDURE — 92553 AUDIOMETRY AIR & BONE: CPT | Mod: HCNC,S$GLB,, | Performed by: AUDIOLOGIST

## 2019-07-26 PROCEDURE — 92567 PR TYMPA2METRY: ICD-10-PCS | Mod: HCNC,S$GLB,, | Performed by: AUDIOLOGIST

## 2019-07-26 PROCEDURE — 99999 PR PBB SHADOW E&M-EST. PATIENT-LVL I: ICD-10-PCS | Mod: PBBFAC,HCNC,,

## 2019-07-26 PROCEDURE — G0268 EAR CERUMEN REMOVAL: ICD-10-PCS | Mod: HCNC,S$GLB,, | Performed by: NURSE PRACTITIONER

## 2019-07-26 PROCEDURE — 92567 TYMPANOMETRY: CPT | Mod: HCNC,S$GLB,, | Performed by: AUDIOLOGIST

## 2019-07-26 PROCEDURE — 1101F PR PT FALLS ASSESS DOC 0-1 FALLS W/OUT INJ PAST YR: ICD-10-PCS | Mod: HCNC,CPTII,S$GLB, | Performed by: NURSE PRACTITIONER

## 2019-07-26 NOTE — LETTER
July 26, 2019      Jose Fang MD  [address] [facility]  [place] [address]           [facility] - Otorhinolaryngology  [address]  [facility] [address]  Phone: [phone]  Fax: [phone]          Patient: Ines Pacheco   MR Number: [id number]   YOB: 1950   Date of Visit: 7/26/2019       Dear Dr. Jose Fang:    Thank you for referring Ines Pacheco to me for evaluation. Attached you will find relevant portions of my assessment and plan of care.    If you have questions, please do not hesitate to call me. I look forward to following Ines Pacheco along with you.    Sincerely,    Arlin Galindo, NP    Enclosure  CC:  No Recipients    If you would like to receive this communication electronically, please contact [email or web address] or [phone] to request more information on [employer] access.    For providers and/or their staff who would like to refer a patient to [facility], please contact us through our one-stop-shop provider referral line, [facility], at [phone].    If you feel you have received this communication in error or would no longer like to receive these types of communications, please e-mail [email or web address]

## 2019-07-26 NOTE — PROCEDURES
Ear Cerumen Removal  Date/Time: 7/26/2019 10:25 AM  Performed by: Arlin Galindo NP  Authorized by: Arlin Galindo NP     Location details:  Both ears  Procedure type: curette    Cerumen  Removal Results:  Cerumen completely removed  Patient tolerance:  Patient tolerated the procedure well with no immediate complications     Procedure Note:    The patient was brought to the minor procedure room and placed under the operating microscope of the left ear canal which was cleaned of ceruminous debris. Using a combination of suction, curettes and cup forceps the patient's cerumen impaction was removed. The tympanic membrane was evaluated and was unremarkable. The patient tolerated the procedure well. There were no complications.  Procedure Note:    Patient was brought to the minor procedure room and using the operating microscope of the right ear canal which was cleaned of ceruminous debris. There was a significant cerumen impaction.  Using a combination of suction, curettes and cup forceps the patient's cerumen impaction was removed. Tympanic membrane intact. Pt tolerated well. There were no complications.

## 2019-07-26 NOTE — PROGRESS NOTES
Ines Pacheco was seen today for a hearing evaluation.     Pure tone audiometry revealed a mild - moderate SNHL, AU  Could not test for speech due to language barrier  Tympanometry revealed Type A, bilaterally      Recommendations:  1. Otologic Evaluation  2. Annual Audiogram or repeat audiogram as needed  3. Hearing Protection

## 2019-07-26 NOTE — PROGRESS NOTES
Subjective:      Ines Pacheco is a 68 y.o. female who was referred to me by Dr. Jose Pena-Mo* in consultation for hearing loss.    Mrs. Pacheco reports decreased hearing in both ears for the past several years. She denies tinnitus or dizziness. She reports having lots of wax in her ears that she would like to be removed. She denies using anything to clean ears. There is not a family history of hearing loss at a young age.  There is not a prior history of ear surgery.  There is not a prior history of ear infections .  She denies a history of significant noise exposure.  She does not wear hearing aids currently.  She has not had a hearing test recently.  ETDQ score 4.4 today.      Past Medical History  She has a past medical history of Arthritis, Cataract, Hyperlipidemia, and Hypertension.    Past Surgical History  She has a past surgical history that includes Hysterectomy; Eye surgery; Cataract extraction w/  intraocular lens implant (Right, 03/06/2017); and Cataract extraction w/  intraocular lens implant (Left, 04/24/2017).    Family History  Her family history includes Heart attack in her mother.    Social History  She reports that she has never smoked. She has never used smokeless tobacco. She reports that she does not drink alcohol.    Allergies  She has No Known Allergies.    Medications  She has a current medication list which includes the following prescription(s): amlodipine-benazepril 10-20mg, atorvastatin, and calcium carbonate-vit d3-min.    Review of Systems   Constitutional: Negative for chills, fever and unexpected weight change.   HENT: Positive for hearing loss. Negative for congestion, ear discharge, ear pain, facial swelling, postnasal drip, sinus pressure, sore throat, tinnitus and trouble swallowing.         Ear pressure   Eyes: Negative for pain and visual disturbance.   Respiratory: Negative for apnea and shortness of breath.    Cardiovascular: Negative for chest pain and  palpitations.        High blood pressure   Gastrointestinal: Negative for abdominal pain and nausea.   Endocrine: Negative for cold intolerance and heat intolerance.   Musculoskeletal: Positive for arthralgias. Negative for joint swelling and neck stiffness.   Skin: Negative for color change and rash.   Neurological: Negative for dizziness, facial asymmetry and headaches.   Hematological: Negative for adenopathy. Does not bruise/bleed easily.   Psychiatric/Behavioral: Negative for agitation. The patient is not nervous/anxious.           Objective:     BP (!) 149/85   Pulse 84   Wt 61.7 kg (136 lb)   BMI 24.87 kg/m²      Constitutional:   She is oriented to person, place, and time. Vital signs are normal. She appears well-developed and well-nourished. She appears alert.     Head:  Normocephalic and atraumatic.     Ears:    Right Ear: No lacerations. No drainage, swelling or tenderness. No foreign bodies. No mastoid tenderness. Tympanic membrane is not injected, not scarred, not perforated, not erythematous, not retracted and not bulging. Tympanic membrane mobility is normal. No middle ear effusion. No hemotympanum. Decreased hearing is noted.   Left Ear: No lacerations. No drainage, swelling or tenderness. No foreign bodies. No mastoid tenderness. Tympanic membrane is not injected, not scarred, not perforated, not erythematous, not retracted and not bulging. Tympanic membrane mobility is normal.  No middle ear effusion. No hemotympanum. Decreased hearing is noted.     Nose:  Nose normal including turbinates, nasal mucosa, sinuses and nasal septum.     Neck:  Neck normal without thyromegaly masses, asymmetry, normal tracheal structure, crepitus, and tenderness and no adenopathy.     Cardiovascular:   Normal heart sounds and normal pulses.      Pulmonary/Chest:   Effort normal and breath sounds normal.     Psychiatric:   She has a normal mood and affect.     Neurological:   She is alert and oriented to person,  place, and time. No cranial nerve deficit.       Procedure    Cerumen removal performed.  See procedure note.      Data Reviewed    WBC (K/uL)   Date Value   06/19/2019 6.00     Platelets (K/uL)   Date Value   06/19/2019 241      Creatinine (mg/dL)   Date Value   06/19/2019 0.7     TSH (uIU/mL)   Date Value   06/19/2019 3.591     Glucose (mg/dL)   Date Value   06/19/2019 98     Hemoglobin A1C (%)   Date Value   06/19/2019 5.8 (H)       I independently reviewed the tracings of the complete audiometric evaluation performed today.  I reviewed the audiogram with the patient as well.  Pertinent findings include binaural sloping HF sensorineural hearing loss with normal tymps.         Assessment:     1. Bilateral impacted cerumen    2. Hearing loss, unspecified hearing loss type, unspecified laterality    3. Sensorineural hearing loss (SNHL) of both ears         Plan:     I had a long discussion with the patient and her  regarding her condition and the further workup and management options.    Cerumen removed under microscope.  She has age related SNHL.  I do not recommend hearing aids at this time.  Follow up in one year with audiogram.    Follow up in about 1 year (around 7/26/2020), or if symptoms worsen or fail to improve.

## 2019-07-29 ENCOUNTER — PATIENT MESSAGE (OUTPATIENT)
Dept: ADMINISTRATIVE | Facility: OTHER | Age: 69
End: 2019-07-29

## 2019-08-14 ENCOUNTER — HOSPITAL ENCOUNTER (OUTPATIENT)
Dept: RADIOLOGY | Facility: HOSPITAL | Age: 69
Discharge: HOME | End: 2019-08-14
Attending: FAMILY MEDICINE
Payer: MEDICARE

## 2019-08-14 DIAGNOSIS — Z12.31 ENCOUNTER FOR SCREENING MAMMOGRAM FOR MALIGNANT NEOPLASM OF BREAST: ICD-10-CM

## 2019-08-14 PROCEDURE — 77067 SCR MAMMO BI INCL CAD: CPT | Mod: TC,HCNC,PO

## 2019-08-14 PROCEDURE — 77067 MAMMO DIGITAL SCREENING BILAT WITH TOMOSYNTHESIS_CAD: ICD-10-PCS | Mod: 26,HCNC,, | Performed by: RADIOLOGY

## 2019-08-14 PROCEDURE — 77063 BREAST TOMOSYNTHESIS BI: CPT | Mod: 26,HCNC,, | Performed by: RADIOLOGY

## 2019-08-14 PROCEDURE — 77067 SCR MAMMO BI INCL CAD: CPT | Mod: 26,HCNC,, | Performed by: RADIOLOGY

## 2019-08-14 PROCEDURE — 77063 MAMMO DIGITAL SCREENING BILAT WITH TOMOSYNTHESIS_CAD: ICD-10-PCS | Mod: 26,HCNC,, | Performed by: RADIOLOGY

## 2019-08-21 ENCOUNTER — PES CALL (OUTPATIENT)
Dept: ADMINISTRATIVE | Facility: CLINIC | Age: 69
End: 2019-08-21

## 2019-10-16 ENCOUNTER — TELEPHONE (OUTPATIENT)
Dept: ORTHOPEDICS | Facility: CLINIC | Age: 69
End: 2019-10-16

## 2019-10-18 ENCOUNTER — TELEPHONE (OUTPATIENT)
Dept: ORTHOPEDICS | Facility: CLINIC | Age: 69
End: 2019-10-18

## 2019-10-18 NOTE — TELEPHONE ENCOUNTER
Returned call to  in reference to call about his wife's back pain. I explained to him that Dr Post was a orthopedic surgeon that doesn't treat the back. He said that he was going to call her primary care Dr to see what she would recommend.

## 2019-10-31 ENCOUNTER — PATIENT OUTREACH (OUTPATIENT)
Dept: ADMINISTRATIVE | Facility: HOSPITAL | Age: 69
End: 2019-10-31

## 2019-11-11 ENCOUNTER — PATIENT OUTREACH (OUTPATIENT)
Dept: ADMINISTRATIVE | Facility: HOSPITAL | Age: 69
End: 2019-11-11

## 2019-11-19 ENCOUNTER — LAB VISIT (OUTPATIENT)
Dept: LAB | Facility: HOSPITAL | Age: 69
End: 2019-11-19
Attending: FAMILY MEDICINE
Payer: MEDICARE

## 2019-11-19 DIAGNOSIS — Z12.11 SCREEN FOR COLON CANCER: ICD-10-CM

## 2019-11-19 DIAGNOSIS — Z00.00 ANNUAL PHYSICAL EXAM: ICD-10-CM

## 2019-11-19 PROCEDURE — 82274 ASSAY TEST FOR BLOOD FECAL: CPT | Mod: HCNC

## 2019-11-20 LAB — HEMOCCULT STL QL IA: NEGATIVE

## 2019-12-30 ENCOUNTER — LAB VISIT (OUTPATIENT)
Dept: LAB | Facility: HOSPITAL | Age: 69
End: 2019-12-30
Attending: FAMILY MEDICINE
Payer: MEDICARE

## 2019-12-30 DIAGNOSIS — I10 ESSENTIAL HYPERTENSION: ICD-10-CM

## 2019-12-30 DIAGNOSIS — R73.03 PREDIABETES: ICD-10-CM

## 2019-12-30 LAB
ALBUMIN SERPL BCP-MCNC: 4 G/DL (ref 3.5–5.2)
ALP SERPL-CCNC: 126 U/L (ref 55–135)
ALT SERPL W/O P-5'-P-CCNC: 17 U/L (ref 10–44)
ANION GAP SERPL CALC-SCNC: 6 MMOL/L (ref 8–16)
AST SERPL-CCNC: 17 U/L (ref 10–40)
BASOPHILS # BLD AUTO: 0.04 K/UL (ref 0–0.2)
BASOPHILS NFR BLD: 0.7 % (ref 0–1.9)
BILIRUB SERPL-MCNC: 0.8 MG/DL (ref 0.1–1)
BUN SERPL-MCNC: 12 MG/DL (ref 8–23)
CALCIUM SERPL-MCNC: 9.2 MG/DL (ref 8.7–10.5)
CHLORIDE SERPL-SCNC: 108 MMOL/L (ref 95–110)
CHOLEST SERPL-MCNC: 157 MG/DL (ref 120–199)
CHOLEST/HDLC SERPL: 3.1 {RATIO} (ref 2–5)
CO2 SERPL-SCNC: 29 MMOL/L (ref 23–29)
CREAT SERPL-MCNC: 0.7 MG/DL (ref 0.5–1.4)
DIFFERENTIAL METHOD: NORMAL
EOSINOPHIL # BLD AUTO: 0.2 K/UL (ref 0–0.5)
EOSINOPHIL NFR BLD: 3.1 % (ref 0–8)
ERYTHROCYTE [DISTWIDTH] IN BLOOD BY AUTOMATED COUNT: 13.2 % (ref 11.5–14.5)
EST. GFR  (AFRICAN AMERICAN): >60 ML/MIN/1.73 M^2
EST. GFR  (NON AFRICAN AMERICAN): >60 ML/MIN/1.73 M^2
ESTIMATED AVG GLUCOSE: 120 MG/DL (ref 68–131)
GLUCOSE SERPL-MCNC: 96 MG/DL (ref 70–110)
HBA1C MFR BLD HPLC: 5.8 % (ref 4–5.6)
HCT VFR BLD AUTO: 39.3 % (ref 37–48.5)
HDLC SERPL-MCNC: 51 MG/DL (ref 40–75)
HDLC SERPL: 32.5 % (ref 20–50)
HGB BLD-MCNC: 12.9 G/DL (ref 12–16)
IMM GRANULOCYTES # BLD AUTO: 0 K/UL (ref 0–0.04)
IMM GRANULOCYTES NFR BLD AUTO: 0 % (ref 0–0.5)
LDLC SERPL CALC-MCNC: 93.4 MG/DL (ref 63–159)
LYMPHOCYTES # BLD AUTO: 2 K/UL (ref 1–4.8)
LYMPHOCYTES NFR BLD: 34.1 % (ref 18–48)
MCH RBC QN AUTO: 30.2 PG (ref 27–31)
MCHC RBC AUTO-ENTMCNC: 32.8 G/DL (ref 32–36)
MCV RBC AUTO: 92 FL (ref 82–98)
MONOCYTES # BLD AUTO: 0.5 K/UL (ref 0.3–1)
MONOCYTES NFR BLD: 8.4 % (ref 4–15)
NEUTROPHILS # BLD AUTO: 3.1 K/UL (ref 1.8–7.7)
NEUTROPHILS NFR BLD: 53.7 % (ref 38–73)
NONHDLC SERPL-MCNC: 106 MG/DL
NRBC BLD-RTO: 0 /100 WBC
PLATELET # BLD AUTO: 211 K/UL (ref 150–350)
PMV BLD AUTO: 10.8 FL (ref 9.2–12.9)
POTASSIUM SERPL-SCNC: 4.2 MMOL/L (ref 3.5–5.1)
PROT SERPL-MCNC: 6.6 G/DL (ref 6–8.4)
RBC # BLD AUTO: 4.27 M/UL (ref 4–5.4)
SODIUM SERPL-SCNC: 143 MMOL/L (ref 136–145)
TRIGL SERPL-MCNC: 63 MG/DL (ref 30–150)
WBC # BLD AUTO: 5.83 K/UL (ref 3.9–12.7)

## 2019-12-30 PROCEDURE — 36415 COLL VENOUS BLD VENIPUNCTURE: CPT | Mod: HCNC,PO

## 2019-12-30 PROCEDURE — 83036 HEMOGLOBIN GLYCOSYLATED A1C: CPT | Mod: HCNC

## 2019-12-30 PROCEDURE — 85025 COMPLETE CBC W/AUTO DIFF WBC: CPT | Mod: HCNC

## 2019-12-30 PROCEDURE — 80061 LIPID PANEL: CPT | Mod: HCNC

## 2019-12-30 PROCEDURE — 80053 COMPREHEN METABOLIC PANEL: CPT | Mod: HCNC

## 2020-01-03 ENCOUNTER — OFFICE VISIT (OUTPATIENT)
Dept: FAMILY MEDICINE | Facility: CLINIC | Age: 70
End: 2020-01-03
Payer: MEDICARE

## 2020-01-03 VITALS
WEIGHT: 136.44 LBS | DIASTOLIC BLOOD PRESSURE: 70 MMHG | BODY MASS INDEX: 25.11 KG/M2 | OXYGEN SATURATION: 98 % | SYSTOLIC BLOOD PRESSURE: 138 MMHG | HEART RATE: 81 BPM | HEIGHT: 62 IN

## 2020-01-03 DIAGNOSIS — I10 ESSENTIAL HYPERTENSION: Primary | ICD-10-CM

## 2020-01-03 DIAGNOSIS — E78.5 HYPERLIPIDEMIA, UNSPECIFIED HYPERLIPIDEMIA TYPE: ICD-10-CM

## 2020-01-03 DIAGNOSIS — R73.03 PREDIABETES: ICD-10-CM

## 2020-01-03 DIAGNOSIS — Z23 NEED FOR INFLUENZA VACCINATION: ICD-10-CM

## 2020-01-03 PROCEDURE — 3075F SYST BP GE 130 - 139MM HG: CPT | Mod: CPTII,S$GLB,, | Performed by: FAMILY MEDICINE

## 2020-01-03 PROCEDURE — 3078F DIAST BP <80 MM HG: CPT | Mod: CPTII,S$GLB,, | Performed by: FAMILY MEDICINE

## 2020-01-03 PROCEDURE — 99214 OFFICE O/P EST MOD 30 MIN: CPT | Mod: 25,S$GLB,, | Performed by: FAMILY MEDICINE

## 2020-01-03 PROCEDURE — 1159F PR MEDICATION LIST DOCUMENTED IN MEDICAL RECORD: ICD-10-PCS | Mod: S$GLB,,, | Performed by: FAMILY MEDICINE

## 2020-01-03 PROCEDURE — 1126F AMNT PAIN NOTED NONE PRSNT: CPT | Mod: S$GLB,,, | Performed by: FAMILY MEDICINE

## 2020-01-03 PROCEDURE — 3075F PR MOST RECENT SYSTOLIC BLOOD PRESS GE 130-139MM HG: ICD-10-PCS | Mod: CPTII,S$GLB,, | Performed by: FAMILY MEDICINE

## 2020-01-03 PROCEDURE — 1101F PR PT FALLS ASSESS DOC 0-1 FALLS W/OUT INJ PAST YR: ICD-10-PCS | Mod: CPTII,S$GLB,, | Performed by: FAMILY MEDICINE

## 2020-01-03 PROCEDURE — 99214 PR OFFICE/OUTPT VISIT, EST, LEVL IV, 30-39 MIN: ICD-10-PCS | Mod: 25,S$GLB,, | Performed by: FAMILY MEDICINE

## 2020-01-03 PROCEDURE — 99999 PR PBB SHADOW E&M-EST. PATIENT-LVL III: CPT | Mod: PBBFAC,,, | Performed by: FAMILY MEDICINE

## 2020-01-03 PROCEDURE — 90662 IIV NO PRSV INCREASED AG IM: CPT | Mod: S$GLB,,, | Performed by: FAMILY MEDICINE

## 2020-01-03 PROCEDURE — 90662 FLU VACCINE - HIGH DOSE (65+) PRESERVATIVE FREE IM: ICD-10-PCS | Mod: S$GLB,,, | Performed by: FAMILY MEDICINE

## 2020-01-03 PROCEDURE — G0008 FLU VACCINE - HIGH DOSE (65+) PRESERVATIVE FREE IM: ICD-10-PCS | Mod: S$GLB,,, | Performed by: FAMILY MEDICINE

## 2020-01-03 PROCEDURE — 99999 PR PBB SHADOW E&M-EST. PATIENT-LVL III: ICD-10-PCS | Mod: PBBFAC,,, | Performed by: FAMILY MEDICINE

## 2020-01-03 PROCEDURE — G0008 ADMIN INFLUENZA VIRUS VAC: HCPCS | Mod: S$GLB,,, | Performed by: FAMILY MEDICINE

## 2020-01-03 PROCEDURE — 3078F PR MOST RECENT DIASTOLIC BLOOD PRESSURE < 80 MM HG: ICD-10-PCS | Mod: CPTII,S$GLB,, | Performed by: FAMILY MEDICINE

## 2020-01-03 PROCEDURE — 1101F PT FALLS ASSESS-DOCD LE1/YR: CPT | Mod: CPTII,S$GLB,, | Performed by: FAMILY MEDICINE

## 2020-01-03 PROCEDURE — 1126F PR PAIN SEVERITY QUANTIFIED, NO PAIN PRESENT: ICD-10-PCS | Mod: S$GLB,,, | Performed by: FAMILY MEDICINE

## 2020-01-03 PROCEDURE — 1159F MED LIST DOCD IN RCRD: CPT | Mod: S$GLB,,, | Performed by: FAMILY MEDICINE

## 2020-01-03 RX ORDER — AMLODIPINE AND BENAZEPRIL HYDROCHLORIDE 10; 20 MG/1; MG/1
1 CAPSULE ORAL DAILY
Qty: 90 CAPSULE | Refills: 3 | Status: SHIPPED | OUTPATIENT
Start: 2020-01-03 | End: 2020-10-05 | Stop reason: SDUPTHER

## 2020-01-03 RX ORDER — ATORVASTATIN CALCIUM 10 MG/1
10 TABLET, FILM COATED ORAL DAILY
Qty: 90 TABLET | Refills: 3 | Status: SHIPPED | OUTPATIENT
Start: 2020-01-03 | End: 2020-10-05 | Stop reason: SDUPTHER

## 2020-01-03 NOTE — PROGRESS NOTES
Subjective:       Patient ID: Ines Pacheco is a 69 y.o. female.    Chief Complaint: Follow-up    69 years old female came to the clinic for blood pressure check.  Repeat blood pressure was normal.  No chest pain, palpitation, orthopnea or PND.  Patient with stable blood sugar for the last 3 months.  Last A1c was 5.8.  Cholesterol was normal.  Patient due for her influenza shot.    Review of Systems   Constitutional: Negative.    HENT: Negative.    Eyes: Negative.    Respiratory: Negative.    Cardiovascular: Negative.    Gastrointestinal: Negative.    Genitourinary: Negative.    Musculoskeletal: Negative.    Skin: Negative.    Neurological: Negative.    Psychiatric/Behavioral: Negative.        Objective:      Physical Exam   Constitutional: She is oriented to person, place, and time. She appears well-developed and well-nourished. No distress.   HENT:   Head: Normocephalic and atraumatic.   Right Ear: External ear normal.   Left Ear: External ear normal.   Nose: Nose normal.   Mouth/Throat: Oropharynx is clear and moist. No oropharyngeal exudate.   Eyes: Pupils are equal, round, and reactive to light. Conjunctivae and EOM are normal. Right eye exhibits no discharge. Left eye exhibits no discharge. No scleral icterus.   Neck: Normal range of motion. Neck supple. No JVD present. No tracheal deviation present. No thyromegaly present.   Cardiovascular: Normal rate, regular rhythm, normal heart sounds and intact distal pulses. Exam reveals no gallop and no friction rub.   No murmur heard.  Pulmonary/Chest: Effort normal and breath sounds normal. No stridor. No respiratory distress. She has no wheezes. She has no rales. She exhibits no tenderness.   Abdominal: Soft. Bowel sounds are normal. She exhibits no distension and no mass. There is no tenderness. There is no rebound and no guarding.   Musculoskeletal: Normal range of motion. She exhibits no edema or tenderness.   Lymphadenopathy:     She has no cervical  adenopathy.   Neurological: She is alert and oriented to person, place, and time. She has normal reflexes. No cranial nerve deficit. She exhibits normal muscle tone. Coordination normal.   Skin: Skin is warm and dry. No rash noted. She is not diaphoretic. No erythema. No pallor.   Psychiatric: She has a normal mood and affect. Her behavior is normal. Judgment and thought content normal.       Assessment:       1. Essential hypertension    2. Prediabetes    3. Hyperlipidemia, unspecified hyperlipidemia type    4. Need for influenza vaccination        Plan:         Ines was seen today for follow-up.    Diagnoses and all orders for this visit:    Essential hypertension  -     Comprehensive metabolic panel; Future  -     Lipid panel; Future  -     TSH; Future  -     CBC auto differential; Future  -     amlodipine-benazepril 10-20mg (LOTREL) 10-20 mg per capsule; Take 1 capsule by mouth once daily.    Prediabetes  -     Comprehensive metabolic panel; Future  -     Lipid panel; Future  -     Hemoglobin A1c; Future    Hyperlipidemia, unspecified hyperlipidemia type  -     Comprehensive metabolic panel; Future  -     Lipid panel; Future  -     atorvastatin (LIPITOR) 10 MG tablet; Take 1 tablet (10 mg total) by mouth once daily.    Need for influenza vaccination  -     Influenza - High Dose (65+) (PF) (IM)

## 2020-02-26 ENCOUNTER — OFFICE VISIT (OUTPATIENT)
Dept: INTERNAL MEDICINE | Facility: CLINIC | Age: 70
End: 2020-02-26
Payer: MEDICARE

## 2020-02-26 VITALS
HEIGHT: 61 IN | SYSTOLIC BLOOD PRESSURE: 120 MMHG | TEMPERATURE: 99 F | WEIGHT: 135.13 LBS | BODY MASS INDEX: 25.51 KG/M2 | RESPIRATION RATE: 16 BRPM | HEART RATE: 76 BPM | DIASTOLIC BLOOD PRESSURE: 64 MMHG

## 2020-02-26 DIAGNOSIS — R42 DIZZINESS: Primary | ICD-10-CM

## 2020-02-26 PROCEDURE — 93010 ELECTROCARDIOGRAM REPORT: CPT | Mod: S$GLB,,, | Performed by: INTERNAL MEDICINE

## 2020-02-26 PROCEDURE — 93005 ELECTROCARDIOGRAM TRACING: CPT | Mod: S$GLB,,, | Performed by: HOSPITALIST

## 2020-02-26 PROCEDURE — 1159F PR MEDICATION LIST DOCUMENTED IN MEDICAL RECORD: ICD-10-PCS | Mod: S$GLB,,, | Performed by: HOSPITALIST

## 2020-02-26 PROCEDURE — 99213 PR OFFICE/OUTPT VISIT, EST, LEVL III, 20-29 MIN: ICD-10-PCS | Mod: S$GLB,,, | Performed by: HOSPITALIST

## 2020-02-26 PROCEDURE — 1126F PR PAIN SEVERITY QUANTIFIED, NO PAIN PRESENT: ICD-10-PCS | Mod: S$GLB,,, | Performed by: HOSPITALIST

## 2020-02-26 PROCEDURE — 1159F MED LIST DOCD IN RCRD: CPT | Mod: S$GLB,,, | Performed by: HOSPITALIST

## 2020-02-26 PROCEDURE — 93005 EKG 12-LEAD: ICD-10-PCS | Mod: S$GLB,,, | Performed by: HOSPITALIST

## 2020-02-26 PROCEDURE — 1101F PR PT FALLS ASSESS DOC 0-1 FALLS W/OUT INJ PAST YR: ICD-10-PCS | Mod: CPTII,S$GLB,, | Performed by: HOSPITALIST

## 2020-02-26 PROCEDURE — 3078F DIAST BP <80 MM HG: CPT | Mod: CPTII,S$GLB,, | Performed by: HOSPITALIST

## 2020-02-26 PROCEDURE — 3074F PR MOST RECENT SYSTOLIC BLOOD PRESSURE < 130 MM HG: ICD-10-PCS | Mod: CPTII,S$GLB,, | Performed by: HOSPITALIST

## 2020-02-26 PROCEDURE — 3074F SYST BP LT 130 MM HG: CPT | Mod: CPTII,S$GLB,, | Performed by: HOSPITALIST

## 2020-02-26 PROCEDURE — 93010 EKG 12-LEAD: ICD-10-PCS | Mod: S$GLB,,, | Performed by: INTERNAL MEDICINE

## 2020-02-26 PROCEDURE — 99213 OFFICE O/P EST LOW 20 MIN: CPT | Mod: S$GLB,,, | Performed by: HOSPITALIST

## 2020-02-26 PROCEDURE — 99999 PR PBB SHADOW E&M-EST. PATIENT-LVL III: CPT | Mod: PBBFAC,,, | Performed by: HOSPITALIST

## 2020-02-26 PROCEDURE — 3078F PR MOST RECENT DIASTOLIC BLOOD PRESSURE < 80 MM HG: ICD-10-PCS | Mod: CPTII,S$GLB,, | Performed by: HOSPITALIST

## 2020-02-26 PROCEDURE — 1101F PT FALLS ASSESS-DOCD LE1/YR: CPT | Mod: CPTII,S$GLB,, | Performed by: HOSPITALIST

## 2020-02-26 PROCEDURE — 1126F AMNT PAIN NOTED NONE PRSNT: CPT | Mod: S$GLB,,, | Performed by: HOSPITALIST

## 2020-02-26 PROCEDURE — 99999 PR PBB SHADOW E&M-EST. PATIENT-LVL III: ICD-10-PCS | Mod: PBBFAC,,, | Performed by: HOSPITALIST

## 2020-02-26 NOTE — PROGRESS NOTES
"Subjective:     @Patient ID: Ines Pacheco is a 69 y.o. female.    Chief Complaint: Dizziness    HPI    70 yo F presents for urgent evaluation with dizziness x - 2-3 months  Pt has pmhx of htn (on amlodipine-benazepril)   Reports now more frequent and feels off-balance   No recent URI   No ear pain  Reports had ear wax issues last year.   Last episode last night, when watching the news. Occurs at anytime. Only lasts for a few seconds   No new medications. Only taking vitamins     Review of Systems   Constitutional: Negative for chills and fever.   HENT: Negative for congestion and sore throat.    Eyes: Negative for pain and visual disturbance.   Respiratory: Negative for cough and shortness of breath.    Cardiovascular: Negative for chest pain and leg swelling.   Gastrointestinal: Negative for abdominal pain, nausea and vomiting.   Genitourinary: Negative for difficulty urinating and dysuria.   Musculoskeletal: Positive for arthralgias and back pain.        Chronic   Neurological: Positive for dizziness. Negative for weakness and headaches.   Psychiatric/Behavioral: Negative for agitation and confusion.     Past medical history, surgical history, and family medical history reviewed and updated as appropriate.    Medications and allergies reviewed.     Objective:     Vitals:    02/26/20 1525   BP: 120/64   BP Location: Right arm   Patient Position: Sitting   BP Method: Medium (Manual)   Pulse: 76   Resp: 16   Temp: 98.6 °F (37 °C)   TempSrc: Oral   Weight: 61.3 kg (135 lb 2.3 oz)   Height: 5' 1" (1.549 m)     Body mass index is 25.53 kg/m².  Physical Exam   Constitutional: She is oriented to person, place, and time. She appears well-developed and well-nourished. No distress.   HENT:   Head: Normocephalic and atraumatic.   Right Ear: External ear normal.   Left Ear: External ear normal.   Mouth/Throat: Oropharynx is clear and moist. No oropharyngeal exudate.   Cerumen b/l    Eyes: Pupils are equal, round, and " reactive to light. Conjunctivae and EOM are normal. Right eye exhibits no discharge. Left eye exhibits no discharge.   Neck: Normal range of motion. Neck supple.   Cardiovascular: Normal rate, regular rhythm and intact distal pulses. Exam reveals no friction rub.   No murmur heard.  Pulmonary/Chest: Effort normal and breath sounds normal.   Abdominal: Soft. Bowel sounds are normal. She exhibits no distension. There is no tenderness. There is no guarding.   Musculoskeletal: Normal range of motion. She exhibits no edema.   Neurological: She is alert and oriented to person, place, and time. No cranial nerve deficit. Coordination normal.   Skin: Skin is warm and dry.   Psychiatric: She has a normal mood and affect. Her behavior is normal.   Vitals reviewed.      Lab Results   Component Value Date    WBC 5.83 12/30/2019    HGB 12.9 12/30/2019    HCT 39.3 12/30/2019     12/30/2019    CHOL 157 12/30/2019    TRIG 63 12/30/2019    HDL 51 12/30/2019    ALT 17 12/30/2019    AST 17 12/30/2019     12/30/2019    K 4.2 12/30/2019     12/30/2019    CREATININE 0.7 12/30/2019    BUN 12 12/30/2019    CO2 29 12/30/2019    TSH 3.591 06/19/2019    HGBA1C 5.8 (H) 12/30/2019       Assessment:     1. Dizziness      Plan:   Ines was seen today for dizziness.    Diagnoses and all orders for this visit:    Dizziness  - Unclear etiology. Will check EKG. ENT for further evaluation  -     Ambulatory referral/consult to ENT; Future  -     IN OFFICE EKG 12-LEAD (to [facility])          Follow up if symptoms worsen or fail to improve.    Elena Mcbride MD  Internal Medicine    2/26/2020

## 2020-04-30 ENCOUNTER — PATIENT MESSAGE (OUTPATIENT)
Dept: FAMILY MEDICINE | Facility: CLINIC | Age: 70
End: 2020-04-30

## 2020-06-02 ENCOUNTER — PATIENT OUTREACH (OUTPATIENT)
Dept: ADMINISTRATIVE | Facility: OTHER | Age: 70
End: 2020-06-02

## 2020-06-03 ENCOUNTER — OFFICE VISIT (OUTPATIENT)
Dept: CARDIOLOGY | Facility: CLINIC | Age: 70
End: 2020-06-03
Payer: MEDICARE

## 2020-06-03 VITALS
SYSTOLIC BLOOD PRESSURE: 150 MMHG | DIASTOLIC BLOOD PRESSURE: 74 MMHG | BODY MASS INDEX: 24.52 KG/M2 | HEIGHT: 62 IN | WEIGHT: 133.25 LBS | HEART RATE: 75 BPM

## 2020-06-03 DIAGNOSIS — E78.5 HYPERLIPIDEMIA, UNSPECIFIED HYPERLIPIDEMIA TYPE: ICD-10-CM

## 2020-06-03 DIAGNOSIS — I10 ESSENTIAL HYPERTENSION: Primary | ICD-10-CM

## 2020-06-03 PROCEDURE — 1125F PR PAIN SEVERITY QUANTIFIED, PAIN PRESENT: ICD-10-PCS | Mod: S$GLB,,, | Performed by: INTERNAL MEDICINE

## 2020-06-03 PROCEDURE — 99213 PR OFFICE/OUTPT VISIT, EST, LEVL III, 20-29 MIN: ICD-10-PCS | Mod: S$GLB,,, | Performed by: INTERNAL MEDICINE

## 2020-06-03 PROCEDURE — 99999 PR PBB SHADOW E&M-EST. PATIENT-LVL III: ICD-10-PCS | Mod: PBBFAC,,, | Performed by: INTERNAL MEDICINE

## 2020-06-03 PROCEDURE — 99499 RISK ADDL DX/OHS AUDIT: ICD-10-PCS | Mod: S$GLB,,, | Performed by: INTERNAL MEDICINE

## 2020-06-03 PROCEDURE — 99999 PR PBB SHADOW E&M-EST. PATIENT-LVL III: CPT | Mod: PBBFAC,,, | Performed by: INTERNAL MEDICINE

## 2020-06-03 PROCEDURE — 3077F SYST BP >= 140 MM HG: CPT | Mod: CPTII,S$GLB,, | Performed by: INTERNAL MEDICINE

## 2020-06-03 PROCEDURE — 1101F PR PT FALLS ASSESS DOC 0-1 FALLS W/OUT INJ PAST YR: ICD-10-PCS | Mod: CPTII,S$GLB,, | Performed by: INTERNAL MEDICINE

## 2020-06-03 PROCEDURE — 1159F PR MEDICATION LIST DOCUMENTED IN MEDICAL RECORD: ICD-10-PCS | Mod: S$GLB,,, | Performed by: INTERNAL MEDICINE

## 2020-06-03 PROCEDURE — 1125F AMNT PAIN NOTED PAIN PRSNT: CPT | Mod: S$GLB,,, | Performed by: INTERNAL MEDICINE

## 2020-06-03 PROCEDURE — 99213 OFFICE O/P EST LOW 20 MIN: CPT | Mod: S$GLB,,, | Performed by: INTERNAL MEDICINE

## 2020-06-03 PROCEDURE — 1101F PT FALLS ASSESS-DOCD LE1/YR: CPT | Mod: CPTII,S$GLB,, | Performed by: INTERNAL MEDICINE

## 2020-06-03 PROCEDURE — 1159F MED LIST DOCD IN RCRD: CPT | Mod: S$GLB,,, | Performed by: INTERNAL MEDICINE

## 2020-06-03 PROCEDURE — 99499 UNLISTED E&M SERVICE: CPT | Mod: S$GLB,,, | Performed by: INTERNAL MEDICINE

## 2020-06-03 PROCEDURE — 3078F DIAST BP <80 MM HG: CPT | Mod: CPTII,S$GLB,, | Performed by: INTERNAL MEDICINE

## 2020-06-03 PROCEDURE — 3078F PR MOST RECENT DIASTOLIC BLOOD PRESSURE < 80 MM HG: ICD-10-PCS | Mod: CPTII,S$GLB,, | Performed by: INTERNAL MEDICINE

## 2020-06-03 PROCEDURE — 3077F PR MOST RECENT SYSTOLIC BLOOD PRESSURE >= 140 MM HG: ICD-10-PCS | Mod: CPTII,S$GLB,, | Performed by: INTERNAL MEDICINE

## 2020-06-03 RX ORDER — HYDROCHLOROTHIAZIDE 12.5 MG/1
12.5 TABLET ORAL DAILY
Qty: 30 TABLET | Refills: 11 | Status: SHIPPED | OUTPATIENT
Start: 2020-06-03 | End: 2020-10-05 | Stop reason: SDUPTHER

## 2020-06-03 NOTE — PROGRESS NOTES
Subjective:   Patient ID:  Ines Pacheco is a 69 y.o. female who presents for follow up of Hypertension      HPI: Routine 16 month f/u.  She is doing well with no new symptoms or cardiovascular complaints and no change in exercise capacity.  She denies chest discomfort, WESTBROOK, palpitations, PND/orthopnea, lightheadedness (except for two very brief instances) and syncope.    Watches Na+ intake scrupulously.    Takes medicines in the evening.    No F/C/cough/diarrhea/anosmia.        2019 HPI: Routine 15 month f/u.  She is doing well with no new symptoms or cardiovascular complaints and no change in exercise capacity.  She denies chest discomfort, WESTBROOK, palpitations, PND/orthopnea, lightheadedness and syncope.     She hasn't taken her medicines yet today.     Not exercising but doesn't sit still at home.     She's watching her Na+ intake.     2017 HPI: Back for routine f/u.  Did not get set up with digital HTN.  Very brief palpitations once.  Very brief lightheadedness once without coming close to losing consciousness (this happened in [place]).  She then says she had an episode where she passed out in [place] at a  where it was very hot and she had not been eating or drinking.       No angina or new WESTBROOK.  No significant palpitations.  No PND/orthopnea.     Oct 2016 HPI: Back for 3 month f/u.  Stopped taking HCTZ because it made her vomit.  BPs have been good at home but she did not yet enroll in digital HTN.     She denies chest discomfort, WESTBROOK, palpitations, PND/orthopnea, lightheadedness and syncope.     Stress level is better though she still has some from stress from her son who is bed bound (he was born with hydrocephalus).     2016 HPI: Very pleasant woman who previously saw Dr. Arrieta. She's here because her BP has been higher than usual in the past couple of days. SBPs in the 150s-160s. She denies chest discomfort, WESTBROOK, palpitations, PND/orthopnea, lightheadedness and syncope. She has  a little stress in her life - concerns about her son and daughter     Nov 2014 HPI (Meenakshi): 63 y.o. female from [facility] with HTN and HLD who comes for follow up of hypertension and dyslipidemia. She last saw Dr. Escalante in 12/2013. She is doing well and denies syncope, lightheadedness, chest discomfort, shortness of breath (rest or dyspnea on exertion), palpitations, PND/orthopnea, or lower extremity swelling. She stopped her lipitor her PCP and her lipid values recently were worse. Her BP has been well controlled.     Patient Active Problem List   Diagnosis    Hyperlipidemia    Hypertension    Generalized osteoarthritis    AR (allergic rhinitis)    Cough    Onychomycosis due to dermatophyte    Nuclear sclerotic cataract of left eye    Post-operative state       Current Outpatient Medications   Medication Sig    amlodipine-benazepril 10-20mg (LOTREL) 10-20 mg per capsule Take 1 capsule by mouth once daily.    atorvastatin (LIPITOR) 10 MG tablet Take 1 tablet (10 mg total) by mouth once daily.    calcium carbonate-vit D3-min 600 mg calcium- 400 unit Tab Take 1 tablet by mouth 2 (two) times daily.     No current facility-administered medications for this visit.        Review of Systems   Constitution: Negative.   HENT: Negative.    Eyes: Negative.    Cardiovascular: Negative.  Negative for chest pain, dyspnea on exertion, near-syncope, orthopnea and palpitations.   Respiratory: Negative.  Negative for cough and shortness of breath.    Endocrine: Negative.    Hematologic/Lymphatic: Negative.    Skin: Negative.    Musculoskeletal: Negative.    Gastrointestinal: Negative.    Genitourinary: Negative.    Neurological: Negative.    Psychiatric/Behavioral: Negative.      Objective:   Physical Exam   Constitutional: She is oriented to person, place, and time. She appears well-developed and well-nourished.   HENT:   Head: Normocephalic and atraumatic.   Mouth/Throat: Oropharynx is clear and moist.   Eyes:  Conjunctivae and EOM are normal. No scleral icterus.   Neck: Normal range of motion. Neck supple. No JVD present.   Cardiovascular: Normal rate, regular rhythm, normal heart sounds and intact distal pulses. Exam reveals no gallop and no friction rub.   No murmur heard.  Pulmonary/Chest: Effort normal and breath sounds normal. She has no wheezes. She has no rales.   Abdominal: Soft. Bowel sounds are normal. She exhibits no distension. There is no tenderness.   Musculoskeletal: Normal range of motion. She exhibits tenderness (Mild tenderness and swelling of the right thumb (MCP) and first finger (PIP)). She exhibits no edema.   Neurological: She is alert and oriented to person, place, and time.   Skin: Skin is warm and dry. No rash noted. No erythema.   Psychiatric: She has a normal mood and affect. Her behavior is normal. Judgment and thought content normal.   Vitals reviewed.      Lab Results   Component Value Date    WBC 5.83 12/30/2019    HGB 12.9 12/30/2019    HCT 39.3 12/30/2019    MCV 92 12/30/2019     12/30/2019         Chemistry        Component Value Date/Time     12/30/2019 0756    K 4.2 12/30/2019 0756     12/30/2019 0756    CO2 29 12/30/2019 0756    BUN 12 12/30/2019 0756    CREATININE 0.7 12/30/2019 0756    GLU 96 12/30/2019 0756        Component Value Date/Time    CALCIUM 9.2 12/30/2019 0756    ALKPHOS 126 12/30/2019 0756    AST 17 12/30/2019 0756    ALT 17 12/30/2019 0756    BILITOT 0.8 12/30/2019 0756    ESTGFRAFRICA >60.0 12/30/2019 0756    EGFRNONAA >60.0 12/30/2019 0756            Lab Results   Component Value Date    CHOL 157 12/30/2019    CHOL 181 06/19/2019    CHOL 148 02/26/2018     Lab Results   Component Value Date    HDL 51 12/30/2019    HDL 45 06/19/2019    HDL 46 02/26/2018     Lab Results   Component Value Date    LDLCALC 93.4 12/30/2019    LDLCALC 119.4 06/19/2019    LDLCALC 93.4 02/26/2018     Lab Results   Component Value Date    TRIG 63 12/30/2019    TRIG 83  06/19/2019    TRIG 43 02/26/2018     Lab Results   Component Value Date    CHOLHDL 32.5 12/30/2019    CHOLHDL 24.9 06/19/2019    CHOLHDL 31.1 02/26/2018       Lab Results   Component Value Date    TSH 3.591 06/19/2019       Lab Results   Component Value Date    HGBA1C 5.8 (H) 12/30/2019       Assessment:     1. Essential hypertension    2. Hyperlipidemia, unspecified hyperlipidemia type        Plan:     Add HCTZ 12.5mg daily.  Continue Lotrel 10/20.    Digital HTN attempt again.    Diet/exercise goals reinforced.    Hand washing and social distancing stressed.    F/U 6 months

## 2020-06-04 ENCOUNTER — TELEPHONE (OUTPATIENT)
Dept: RHEUMATOLOGY | Facility: CLINIC | Age: 70
End: 2020-06-04

## 2020-06-10 ENCOUNTER — PATIENT OUTREACH (OUTPATIENT)
Dept: ADMINISTRATIVE | Facility: OTHER | Age: 70
End: 2020-06-10

## 2020-06-11 ENCOUNTER — LAB VISIT (OUTPATIENT)
Dept: LAB | Facility: HOSPITAL | Age: 70
End: 2020-06-11
Attending: INTERNAL MEDICINE
Payer: MEDICARE

## 2020-06-11 ENCOUNTER — OFFICE VISIT (OUTPATIENT)
Dept: RHEUMATOLOGY | Facility: CLINIC | Age: 70
End: 2020-06-11
Payer: MEDICARE

## 2020-06-11 VITALS
TEMPERATURE: 98 F | SYSTOLIC BLOOD PRESSURE: 132 MMHG | HEART RATE: 83 BPM | BODY MASS INDEX: 25.32 KG/M2 | DIASTOLIC BLOOD PRESSURE: 78 MMHG | HEIGHT: 62 IN | WEIGHT: 137.56 LBS

## 2020-06-11 DIAGNOSIS — M54.6 CHRONIC BILATERAL THORACIC BACK PAIN: ICD-10-CM

## 2020-06-11 DIAGNOSIS — G89.29 CHRONIC BILATERAL THORACIC BACK PAIN: ICD-10-CM

## 2020-06-11 DIAGNOSIS — M54.50 BILATERAL LOW BACK PAIN WITHOUT SCIATICA, UNSPECIFIED CHRONICITY: ICD-10-CM

## 2020-06-11 DIAGNOSIS — M25.50 POLYARTHRALGIA: Primary | ICD-10-CM

## 2020-06-11 DIAGNOSIS — M25.50 POLYARTHRALGIA: ICD-10-CM

## 2020-06-11 DIAGNOSIS — M15.4 EROSIVE OSTEOARTHRITIS OF HANDS, BILATERAL: ICD-10-CM

## 2020-06-11 PROCEDURE — 1159F MED LIST DOCD IN RCRD: CPT | Mod: S$GLB,,, | Performed by: INTERNAL MEDICINE

## 2020-06-11 PROCEDURE — 3075F SYST BP GE 130 - 139MM HG: CPT | Mod: CPTII,S$GLB,, | Performed by: INTERNAL MEDICINE

## 2020-06-11 PROCEDURE — 99205 OFFICE O/P NEW HI 60 MIN: CPT | Mod: S$GLB,,, | Performed by: INTERNAL MEDICINE

## 2020-06-11 PROCEDURE — 1101F PR PT FALLS ASSESS DOC 0-1 FALLS W/OUT INJ PAST YR: ICD-10-PCS | Mod: CPTII,S$GLB,, | Performed by: INTERNAL MEDICINE

## 2020-06-11 PROCEDURE — 3078F DIAST BP <80 MM HG: CPT | Mod: CPTII,S$GLB,, | Performed by: INTERNAL MEDICINE

## 2020-06-11 PROCEDURE — 99999 PR PBB SHADOW E&M-EST. PATIENT-LVL V: ICD-10-PCS | Mod: PBBFAC,,, | Performed by: INTERNAL MEDICINE

## 2020-06-11 PROCEDURE — 86480 TB TEST CELL IMMUN MEASURE: CPT

## 2020-06-11 PROCEDURE — 3078F PR MOST RECENT DIASTOLIC BLOOD PRESSURE < 80 MM HG: ICD-10-PCS | Mod: CPTII,S$GLB,, | Performed by: INTERNAL MEDICINE

## 2020-06-11 PROCEDURE — 1159F PR MEDICATION LIST DOCUMENTED IN MEDICAL RECORD: ICD-10-PCS | Mod: S$GLB,,, | Performed by: INTERNAL MEDICINE

## 2020-06-11 PROCEDURE — 3075F PR MOST RECENT SYSTOLIC BLOOD PRESS GE 130-139MM HG: ICD-10-PCS | Mod: CPTII,S$GLB,, | Performed by: INTERNAL MEDICINE

## 2020-06-11 PROCEDURE — 1101F PT FALLS ASSESS-DOCD LE1/YR: CPT | Mod: CPTII,S$GLB,, | Performed by: INTERNAL MEDICINE

## 2020-06-11 PROCEDURE — 99205 PR OFFICE/OUTPT VISIT, NEW, LEVL V, 60-74 MIN: ICD-10-PCS | Mod: S$GLB,,, | Performed by: INTERNAL MEDICINE

## 2020-06-11 PROCEDURE — 99999 PR PBB SHADOW E&M-EST. PATIENT-LVL V: CPT | Mod: PBBFAC,,, | Performed by: INTERNAL MEDICINE

## 2020-06-11 PROCEDURE — 1125F PR PAIN SEVERITY QUANTIFIED, PAIN PRESENT: ICD-10-PCS | Mod: S$GLB,,, | Performed by: INTERNAL MEDICINE

## 2020-06-11 PROCEDURE — 1125F AMNT PAIN NOTED PAIN PRSNT: CPT | Mod: S$GLB,,, | Performed by: INTERNAL MEDICINE

## 2020-06-11 RX ORDER — PREDNISONE 20 MG/1
20 TABLET ORAL DAILY
Qty: 10 TABLET | Refills: 0 | Status: SHIPPED | OUTPATIENT
Start: 2020-06-11 | End: 2020-10-05

## 2020-06-11 ASSESSMENT — ROUTINE ASSESSMENT OF PATIENT INDEX DATA (RAPID3)
PATIENT GLOBAL ASSESSMENT SCORE: 5
FATIGUE SCORE: 0
PAIN SCORE: 8
TOTAL RAPID3 SCORE: 5.11
PSYCHOLOGICAL DISTRESS SCORE: 0
MDHAQ FUNCTION SCORE: .7
AM STIFFNESS SCORE: 0, NO

## 2020-06-11 NOTE — PROGRESS NOTES
Subjective:       Patient ID: Ines Pacheco is a 69 y.o. female.    Chief Complaint: Disease Management    HPI 69 year old F with PMH of HTN, cataract, HL, OA here for evaluation. Reports she has been pain in shoulders, neck, mid back, low back for many years.   For the last days, she felt swelling in left thumb.  She has pain in both hands with associated swelling for 3 months.  Reports some discomfort with making a fist. She has trouble opening jars. Pain level is as high as 10/10, aching and non-radiating. She took tylenol arthritis with improvement.  Denies oral ulcers, fevers, rashes, pleurisy, or photosensitivity.       Past Medical History:   Diagnosis Date    Arthritis     Cataract     Hyperlipidemia     Hypertension        Review of Systems   Constitutional: Negative for activity change, appetite change, chills, diaphoresis, fatigue, fever and unexpected weight change.   HENT: Negative for congestion, ear discharge, ear pain, facial swelling, mouth sores, sinus pressure, sneezing, sore throat, tinnitus and trouble swallowing.    Eyes: Negative for photophobia, pain, discharge, redness, itching and visual disturbance.   Respiratory: Negative for apnea, cough, chest tightness, shortness of breath, wheezing and stridor.    Cardiovascular: Negative for chest pain and leg swelling.   Gastrointestinal: Negative for abdominal distention, abdominal pain, anal bleeding, blood in stool, constipation, diarrhea and nausea.   Endocrine: Negative for cold intolerance and heat intolerance.   Genitourinary: Negative for difficulty urinating, dysuria and genital sores.   Musculoskeletal: Positive for arthralgias and joint swelling. Negative for back pain, gait problem, myalgias, neck pain and neck stiffness.   Skin: Negative for color change, pallor, rash and wound.   Neurological: Negative for dizziness, seizures, light-headedness, numbness and headaches.   Hematological: Negative for adenopathy. Does not  "bruise/bleed easily.   Psychiatric/Behavioral: Negative for sleep disturbance. The patient is not nervous/anxious.          Objective:   /78   Pulse 83   Temp 98.1 °F (36.7 °C)   Ht 5' 2" (1.575 m)   Wt 62.4 kg (137 lb 9.1 oz)   BMI 25.16 kg/m²      Physical Exam   Constitutional: She is oriented to person, place, and time and well-developed, well-nourished, and in no distress. No distress.   HENT:   Head: Normocephalic and atraumatic.   Eyes: Conjunctivae and EOM are normal. Pupils are equal, round, and reactive to light.   Neck: Normal range of motion. Neck supple. No JVD present. No tracheal deviation present. No thyromegaly present.   Cardiovascular: Normal rate and regular rhythm.  Exam reveals no gallop and no friction rub.    No murmur heard.  Pulmonary/Chest: Effort normal and breath sounds normal. No stridor. No respiratory distress. She has no wheezes. She has no rales. She exhibits no tenderness.   Abdominal: Soft. Bowel sounds are normal. She exhibits no distension and no mass. There is no abdominal tenderness. There is no rebound and no guarding.   Lymphadenopathy:     She has no cervical adenopathy.   Neurological: She is oriented to person, place, and time. Gait normal.   Skin: Skin is warm and dry.     Musculoskeletal: Normal range of motion. No tenderness, deformity or edema.         mild synovitis in pips      Labs: reviewed    No data to display     Assessment:     69 year old F with PMH of HTN, cataract, HL, OA here for evaluation of joint pain.  She has some synovitis on exam, so would like to do additional studies.  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  She reports also diffuse back pain so will set her up in spine clinic.    No diagnosis found.        Plan:       Problem List Items Addressed This Visit     None        Labs  xrays  Handout on erosive OA and plaquenil   One hour of face to face contact spent with patient.  PT/OT for hands        *      "

## 2020-06-11 NOTE — PROGRESS NOTES
Rapid3 Question Responses and Scores 6/10/2020   MDHAQ Score 0.7   Psychologic Score 0   Pain Score 8   When you awakened in the morning OVER THE LAST WEEK, did you feel stiff? No   Fatigue Score 0   Global Health Score 5   RAPID3 Score 5.11

## 2020-06-12 LAB
GAMMA INTERFERON BACKGROUND BLD IA-ACNC: 0.03 IU/ML
M TB IFN-G CD4+ BCKGRND COR BLD-ACNC: 0 IU/ML
MITOGEN IGNF BCKGRD COR BLD-ACNC: 7.43 IU/ML
TB GOLD PLUS: NEGATIVE
TB2 - NIL: 0 IU/ML

## 2020-06-15 ENCOUNTER — HOSPITAL ENCOUNTER (OUTPATIENT)
Dept: RADIOLOGY | Facility: HOSPITAL | Age: 70
Discharge: HOME | End: 2020-06-15
Attending: INTERNAL MEDICINE
Payer: MEDICARE

## 2020-06-15 DIAGNOSIS — M25.50 POLYARTHRALGIA: ICD-10-CM

## 2020-06-15 PROCEDURE — 77077 JOINT SURVEY SINGLE VIEW: CPT | Mod: TC,PO

## 2020-06-15 PROCEDURE — 77077 XR ARTHRITIS SURVEY: ICD-10-PCS | Mod: 26,,, | Performed by: RADIOLOGY

## 2020-06-15 PROCEDURE — 77077 JOINT SURVEY SINGLE VIEW: CPT | Mod: 26,,, | Performed by: RADIOLOGY

## 2020-06-19 ENCOUNTER — CLINICAL SUPPORT (OUTPATIENT)
Dept: REHABILITATION | Facility: HOSPITAL | Age: 70
End: 2020-06-19
Attending: INTERNAL MEDICINE
Payer: MEDICARE

## 2020-06-19 DIAGNOSIS — M15.4 EROSIVE OSTEOARTHRITIS OF HANDS, BILATERAL: ICD-10-CM

## 2020-06-19 PROCEDURE — 97110 THERAPEUTIC EXERCISES: CPT | Mod: PO

## 2020-06-19 PROCEDURE — 97010 HOT OR COLD PACKS THERAPY: CPT | Mod: PO

## 2020-06-19 PROCEDURE — 97165 OT EVAL LOW COMPLEX 30 MIN: CPT | Mod: PO

## 2020-06-19 NOTE — PLAN OF CARE
[facility] Occupational Therapy  Initial Evaluation     Date: 6/19/2020  Patient: Ines Pacheco  Chart Number: [id number]  Referring Physician: Nancy Tucker MD  Therapy Diagnosis:   1. Erosive osteoarthritis of hands, bilateral  Ambulatory referral/consult to Physical/Occupational Therapy       Medical Diagnosis: M15.4 (ICD-10-CM) - Erosive osteoarthritis of hands, bilateral  Physician Orders: Eval/tx  Evaluation Date: 6/19/2020  Plan of Care Certification Date: 9/19/2020  Authorization Period: 7/2/2020  Date of Return to MD: VICTORIA    Visit #: 1 of 1  Time In: 2:00 PM  Time Out: 2:45 PM  Total Billable Time: 15 min    Precautions: Standard     Subjective     Involved Side: Bilateral  Dominant Side: Right  Date of Onset: 2-3 mos  History of Current Condition: Pt w/ gradual onset bilateral hand pain, no trauma  Imaging: Arthritic changes  Previous Therapy: None    Patient's Goals for Therapy: Decrease pain    Pain:  Functional Pain Scale Rating 0-10:   2/10 on average  2/10 at best  7/10 at worst  Location: Throughout both hands  Description: Aching and Throbbing  Aggravating Factors: No particular triggers  Easing Factors: rest    Previous Level of function Independent w/ ADL's    Current Level of Function Difficulty opening containers    Occupation:  Retired    Past Medical History/Physical Systems Review:   Ines Pacheco  has a past medical history of Arthritis, Cataract, Hyperlipidemia, and Hypertension.    Ines Pacheco  has a past surgical history that includes Hysterectomy; Eye surgery; Cataract extraction w/  intraocular lens implant (Right, 03/06/2017); Cataract extraction w/  intraocular lens implant (Left, 04/24/2017); and Oophorectomy.    Ines has a current medication list which includes the following prescription(s): amlodipine-benazepril 10-20mg, atorvastatin, calcium carbonate-vit d3-min, hydrochlorothiazide, and prednisone.    Review of patient's allergies  indicates:  No Known Allergies       Objective     Mental status: alert    Observation:   Arthritic changes throughout both hands    Sensation:   WNL  Range of Motion:   WFL all joints     Strength: (ABEL Dynamometer in psi.)      6/19/2020 6/19/2020    Left Right   Rung II 15 27       Pinch Strength (Measured in psi)     6/19/2020 6/19/2020    Left Right   Key Pinch 3  6    3pt Pinch 4  5        Treatment     Treatment Time In: 2:20 PM  Treatment Time Out: 2:45 PM  Total Treatment time separate from Evaluation time:25 min    Ines received the following supervised modalities after being cleared for contradictions for 10 minutes:   -Moist heat both hands    Ines performed therapeutic exercises for 15 minutes bilaterally including:  -TGE's 10  -Thumb MP/IP blocking, lifts, opposition, RA/PA, circles 10    Home Exercise Program/Education:  Issued HEP (see patient instructions in EMR) and educated on modality use for pain management . Exercises were reviewed and Ines was able to demonstrate them prior to the end of the session.   Pt received a written copy of exercises to perform at home. Ines demonstrated good  understanding of the education provided.  Pt was advised to perform these exercises free of pain, and to stop performing them if pain occurs.    Patient/Family Education: role of OT, goals for OT, scheduling/cancellations - pt verbalized understanding. Discussed insurance limitations with patient.    Additional Education provided: Use of heat prior to HEP      Assessment     Ines Pacheco is a 69 y.o. female presents with limitations as described in problem list. Patient can benefit from Occupational Therapy services for Iontophoresis, ultrasound, moist heat, therapeutic exercises, home exercise program provied with written instructions, ice and strengthening and orthotics, if deemed necessary . The following goals were discussed with the patient and she is in agreement with them as to be addressed in  the treatment plan.    The patient's rehab potential is Fair.     Anticipated barriers to occupational therapy: Advanced age, degenerative nature of her condition.  Pt has no cultural, educational or language barriers to learning provided.    Profile and History Assessment of Occupational Performance Level of Clinical Decision Making Complexity Score   Occupational Profile:   Ines Pacheco is a 69 y.o. female who is retired Ines Pacheco has difficulty with  ADLs and IADLs as listed previously, which  affecting his/her daily functional abilities.      Comorbidities:    has a past medical history of Arthritis, Cataract, Hyperlipidemia, and Hypertension.    Medical and Therapy History Review:   Brief               Performance Deficits    Physical:  Joint Mobility   Strength  Pinch Strength  Pain    Cognitive:  No Deficits    Psychosocial:    No Deficits     Clinical Decision Making:  low    Assessment Process:  Problem-Focused Assessments    Modification/Need for Assistance:  Not Necessary    Intervention Selection:  Limited Treatment Options       low  Based on PMHX, co morbidities , data from assessments and functional level of assistance required with task and clinical presentation directly impacting function.         Goals:    LTG's (8 weeks):  1)   Increase  strength 10 lbs. to grasp cooking pot handle.  2)   Increase lat pinch 3 psis for opening bottles.  3)   Decrease complaints of pain to  4 out of 10 at worst to increase functional hand use for ADL/work/leisure activities.  4)   Pt will return to near to prior level of function for ADLs and household management reporting I or Mod I with ADLs (dressing, feeding, grooming, toileting).     STG's (4 weeks)  1)   Patient to be IND with HEP and modalities for pain/edema managment.  2)   Increase  strength 5 lbs. to improve functional grasp for ADLs/work/leisure activities.   3)   Increase lat pinch 2 psi's to increase independence with  button and FM Coordination.   4)   Patient to be IND wiht Orthotic use, wear and care precautions.   5)   Decrease complaints of pain to  5 out of 10 at worst to increase functional hand use for ADL/work/leisure activities.          Plan     Pt to be treated by Occupational Therapy 1 times per week for 8 weeks during the certification period from 6/19/2020 to 9/19/2020 to achieve the established goals.     Treatment to include: Fluidotherapy, US 3 mhz, Therapeutic exercises/activities. and Strengthening, as well as any other treatments deemed necessary based on the patient's needs or progress.     RADHA Barbour  OTR/L, CHT  Occupational therapist, Certified Hand Therapist

## 2020-06-19 NOTE — PATIENT INSTRUCTIONS
Tendon Glides         Start at position A and move through each position slowly attempting to achieve full glide.  A-E is ONE repetition.     Complete 10 reps 3 times per day.     MP Flexion (Active Blocked)        Using other hand to brace base of thumb, bend as far as possible with tip joint held straight.  Repeat 10-30 times. Do 3 sessions per day.                IP Flexion (Active Blocked)        Brace thumb below tip joint. Bend joint as far as possible.  Repeat 10-30 times. Do 3 sessions per day.    MP Flexion (Active)        Bend thumb to touch base of little finger, keeping tip joint straight.  Repeat 10-30 times. Do 3 sessions per day.    Composite Extension (Active)        Bring thumb up and out in hitchhiker position. Repeat 10-30 times. Do 3 sessions per day.    MP Extension (Active)        With palm on table, lift thumb up. Relax and lower thumb.  Repeat 10-30 times. Do 3 sessions per day.    Radial Adduction/Abduction (Active)        Move thumb out to side. Move back alongside index finger.  Repeat 10-30 times. Do 3 sessions per day.       Palmar Adduction/Abduction (Active)        Move thumb down, away from palm. Move back to rest along palm.  Repeat 10-30 times. Do 3 sessions per day.    Composite Movement Circumduction (Active)        Make circles with thumb clockwise and counter-clockwise.  Repeat 10-30 times each direction. Do 3 sessions per day.

## 2020-06-30 ENCOUNTER — LAB VISIT (OUTPATIENT)
Dept: LAB | Facility: HOSPITAL | Age: 70
End: 2020-06-30
Attending: FAMILY MEDICINE
Payer: MEDICARE

## 2020-06-30 DIAGNOSIS — R73.03 PREDIABETES: ICD-10-CM

## 2020-06-30 DIAGNOSIS — E78.5 HYPERLIPIDEMIA, UNSPECIFIED HYPERLIPIDEMIA TYPE: ICD-10-CM

## 2020-06-30 DIAGNOSIS — I10 ESSENTIAL HYPERTENSION: ICD-10-CM

## 2020-06-30 LAB
ALBUMIN SERPL BCP-MCNC: 4.2 G/DL (ref 3.5–5.2)
ALP SERPL-CCNC: 121 U/L (ref 55–135)
ALT SERPL W/O P-5'-P-CCNC: 15 U/L (ref 10–44)
ANION GAP SERPL CALC-SCNC: 8 MMOL/L (ref 8–16)
AST SERPL-CCNC: 16 U/L (ref 10–40)
BASOPHILS # BLD AUTO: 0.04 K/UL (ref 0–0.2)
BASOPHILS NFR BLD: 0.7 % (ref 0–1.9)
BILIRUB SERPL-MCNC: 0.8 MG/DL (ref 0.1–1)
BUN SERPL-MCNC: 10 MG/DL (ref 8–23)
CALCIUM SERPL-MCNC: 9.6 MG/DL (ref 8.7–10.5)
CHLORIDE SERPL-SCNC: 106 MMOL/L (ref 95–110)
CHOLEST SERPL-MCNC: 194 MG/DL (ref 120–199)
CHOLEST/HDLC SERPL: 4 {RATIO} (ref 2–5)
CO2 SERPL-SCNC: 28 MMOL/L (ref 23–29)
CREAT SERPL-MCNC: 0.7 MG/DL (ref 0.5–1.4)
DIFFERENTIAL METHOD: NORMAL
EOSINOPHIL # BLD AUTO: 0.2 K/UL (ref 0–0.5)
EOSINOPHIL NFR BLD: 2.4 % (ref 0–8)
ERYTHROCYTE [DISTWIDTH] IN BLOOD BY AUTOMATED COUNT: 13.3 % (ref 11.5–14.5)
EST. GFR  (AFRICAN AMERICAN): >60 ML/MIN/1.73 M^2
EST. GFR  (NON AFRICAN AMERICAN): >60 ML/MIN/1.73 M^2
ESTIMATED AVG GLUCOSE: 117 MG/DL (ref 68–131)
GLUCOSE SERPL-MCNC: 109 MG/DL (ref 70–110)
HBA1C MFR BLD HPLC: 5.7 % (ref 4–5.6)
HCT VFR BLD AUTO: 41.7 % (ref 37–48.5)
HDLC SERPL-MCNC: 48 MG/DL (ref 40–75)
HDLC SERPL: 24.7 % (ref 20–50)
HGB BLD-MCNC: 13.4 G/DL (ref 12–16)
IMM GRANULOCYTES # BLD AUTO: 0.01 K/UL (ref 0–0.04)
IMM GRANULOCYTES NFR BLD AUTO: 0.2 % (ref 0–0.5)
LDLC SERPL CALC-MCNC: 126.2 MG/DL (ref 63–159)
LYMPHOCYTES # BLD AUTO: 2.9 K/UL (ref 1–4.8)
LYMPHOCYTES NFR BLD: 47.8 % (ref 18–48)
MCH RBC QN AUTO: 30 PG (ref 27–31)
MCHC RBC AUTO-ENTMCNC: 32.1 G/DL (ref 32–36)
MCV RBC AUTO: 94 FL (ref 82–98)
MONOCYTES # BLD AUTO: 0.6 K/UL (ref 0.3–1)
MONOCYTES NFR BLD: 9.5 % (ref 4–15)
NEUTROPHILS # BLD AUTO: 2.4 K/UL (ref 1.8–7.7)
NEUTROPHILS NFR BLD: 39.4 % (ref 38–73)
NONHDLC SERPL-MCNC: 146 MG/DL
NRBC BLD-RTO: 0 /100 WBC
PLATELET # BLD AUTO: 221 K/UL (ref 150–350)
PMV BLD AUTO: 11.3 FL (ref 9.2–12.9)
POTASSIUM SERPL-SCNC: 4 MMOL/L (ref 3.5–5.1)
PROT SERPL-MCNC: 6.8 G/DL (ref 6–8.4)
RBC # BLD AUTO: 4.46 M/UL (ref 4–5.4)
SODIUM SERPL-SCNC: 142 MMOL/L (ref 136–145)
T4 FREE SERPL-MCNC: 0.93 NG/DL (ref 0.71–1.51)
TRIGL SERPL-MCNC: 99 MG/DL (ref 30–150)
TSH SERPL DL<=0.005 MIU/L-ACNC: 4.21 UIU/ML (ref 0.4–4)
WBC # BLD AUTO: 6.13 K/UL (ref 3.9–12.7)

## 2020-06-30 PROCEDURE — 85025 COMPLETE CBC W/AUTO DIFF WBC: CPT

## 2020-06-30 PROCEDURE — 83036 HEMOGLOBIN GLYCOSYLATED A1C: CPT

## 2020-06-30 PROCEDURE — 36415 COLL VENOUS BLD VENIPUNCTURE: CPT | Mod: PO

## 2020-06-30 PROCEDURE — 80061 LIPID PANEL: CPT

## 2020-06-30 PROCEDURE — 84443 ASSAY THYROID STIM HORMONE: CPT

## 2020-06-30 PROCEDURE — 84439 ASSAY OF FREE THYROXINE: CPT

## 2020-06-30 PROCEDURE — 80053 COMPREHEN METABOLIC PANEL: CPT

## 2020-07-01 ENCOUNTER — CLINICAL SUPPORT (OUTPATIENT)
Dept: REHABILITATION | Facility: HOSPITAL | Age: 70
End: 2020-07-01
Attending: INTERNAL MEDICINE
Payer: MEDICARE

## 2020-07-01 DIAGNOSIS — M79.641 PAIN IN BOTH HANDS: ICD-10-CM

## 2020-07-01 DIAGNOSIS — M79.642 PAIN IN BOTH HANDS: ICD-10-CM

## 2020-07-01 PROCEDURE — 97010 HOT OR COLD PACKS THERAPY: CPT | Mod: PO

## 2020-07-01 PROCEDURE — 97110 THERAPEUTIC EXERCISES: CPT | Mod: PO

## 2020-07-01 NOTE — PROGRESS NOTES
Occupational Therapy Daily Treatment Note     Date: 7/1/2020  Name: Ines Pacheco  Clinic Number: [id number]    Therapy Diagnosis:   Encounter Diagnosis   Name Primary?    Pain in both hands      Physician: Nancy Tucker MD    Medical Diagnosis: M15.4 (ICD-10-CM) - Erosive osteoarthritis of hands, bilateral  Physician Orders: Eval/tx  Evaluation Date: 6/19/2020  Plan of Care Certification Date: 9/19/2020  Authorization Period: 7/2/2020  Date of Return to MD: VICTORIA     Visit #: 2 of 6  Time In: 2:00 PM  Time Out: 2:45 PM  Total Billable Time: 15 min     Precautions: Standard     Subjective     Pt reports: Increased pain and edema right IF PIP, less pain in the thumb  Response to previous treatment:Rod well    Pain: 7/10  Location: Right IF PIP      Objective     Ines received the following supervised modalities after being cleared for contradictions for 10 minutes:   -MH to both hands    Ines performed therapeutic exercises for 30 minutes including:  -AROM Thumb all planes 10 ea left  -TGE's 10 right  -Isospheres 3' both  -Coins 1 container both    Home Exercises and Education Provided     Education provided:   - Right IF PIP ext gutter, left short thumb spica  - Progress towards goals     Written Home Exercises Provided: Patient instructed to cont prior HEP.  Exercises were reviewed and Ines was able to demonstrate them prior to the end of the session.  Ines demonstrated good  understanding of the HEP provided.   .   See EMR under Patient Instructions for exercises provided prior visit.        Assessment     Pt would continue to benefit from skilled OT to maximize BUE function.     Ines is not progressing  towards her goals and there are no updates to goals at this time. Pt prognosis is Fair.     Pt will continue to benefit from skilled outpatient occupational therapy to address the deficits listed in the problem list on initial evaluation provide pt/family education and to maximize pt's level of  independence in the home and community environment.       Pt's spiritual, cultural and educational needs considered and pt agreeable to plan of care and goals.    Goals:  LTG's (8 weeks):  1)   Increase  strength 10 lbs. to grasp cooking pot handle.  2)   Increase lat pinch 3 psis for opening bottles.  3)   Decrease complaints of pain to  4 out of 10 at worst to increase functional hand use for ADL/work/leisure activities.  4)   Pt will return to near to prior level of function for ADLs and household management reporting I or Mod I with ADLs (dressing, feeding, grooming, toileting).      STG's (4 weeks)  1)   Patient to be IND with HEP and modalities for pain/edema managment.  2)   Increase  strength 5 lbs. to improve functional grasp for ADLs/work/leisure activities.   3)   Increase lat pinch 2 psi's to increase independence with button and FM Coordination.   4)   Patient to be IND wiht Orthotic use, wear and care precautions.   5)   Decrease complaints of pain to  5 out of 10 at worst to increase functional hand use for ADL/work/leisure activities.    Plan   Cont OT to address above goals.        RADHA Barbour OTR/L, CHT

## 2020-07-02 ENCOUNTER — PATIENT OUTREACH (OUTPATIENT)
Dept: ADMINISTRATIVE | Facility: OTHER | Age: 70
End: 2020-07-02

## 2020-10-05 ENCOUNTER — OFFICE VISIT (OUTPATIENT)
Dept: FAMILY MEDICINE | Facility: CLINIC | Age: 70
End: 2020-10-05
Payer: MEDICARE

## 2020-10-05 VITALS
TEMPERATURE: 98 F | WEIGHT: 130.31 LBS | OXYGEN SATURATION: 97 % | BODY MASS INDEX: 23.98 KG/M2 | DIASTOLIC BLOOD PRESSURE: 88 MMHG | HEART RATE: 77 BPM | HEIGHT: 62 IN | SYSTOLIC BLOOD PRESSURE: 138 MMHG

## 2020-10-05 DIAGNOSIS — H61.20 EXCESSIVE CERUMEN IN EAR CANAL, UNSPECIFIED LATERALITY: ICD-10-CM

## 2020-10-05 DIAGNOSIS — E03.9 HYPOTHYROIDISM, UNSPECIFIED TYPE: ICD-10-CM

## 2020-10-05 DIAGNOSIS — R73.03 PREDIABETES: ICD-10-CM

## 2020-10-05 DIAGNOSIS — E78.5 DYSLIPIDEMIA: ICD-10-CM

## 2020-10-05 DIAGNOSIS — E78.5 HYPERLIPIDEMIA, UNSPECIFIED HYPERLIPIDEMIA TYPE: ICD-10-CM

## 2020-10-05 DIAGNOSIS — Z78.0 ASYMPTOMATIC MENOPAUSE: ICD-10-CM

## 2020-10-05 DIAGNOSIS — J11.1 FLU: ICD-10-CM

## 2020-10-05 DIAGNOSIS — I10 ESSENTIAL HYPERTENSION: Primary | ICD-10-CM

## 2020-10-05 DIAGNOSIS — D17.22 LIPOMA OF LEFT UPPER EXTREMITY: ICD-10-CM

## 2020-10-05 PROCEDURE — 3075F SYST BP GE 130 - 139MM HG: CPT | Mod: CPTII,S$GLB,, | Performed by: FAMILY MEDICINE

## 2020-10-05 PROCEDURE — 1101F PR PT FALLS ASSESS DOC 0-1 FALLS W/OUT INJ PAST YR: ICD-10-PCS | Mod: CPTII,S$GLB,, | Performed by: FAMILY MEDICINE

## 2020-10-05 PROCEDURE — 1159F MED LIST DOCD IN RCRD: CPT | Mod: S$GLB,,, | Performed by: FAMILY MEDICINE

## 2020-10-05 PROCEDURE — 3079F PR MOST RECENT DIASTOLIC BLOOD PRESSURE 80-89 MM HG: ICD-10-PCS | Mod: CPTII,S$GLB,, | Performed by: FAMILY MEDICINE

## 2020-10-05 PROCEDURE — 99214 OFFICE O/P EST MOD 30 MIN: CPT | Mod: 25,S$GLB,, | Performed by: FAMILY MEDICINE

## 2020-10-05 PROCEDURE — 3008F BODY MASS INDEX DOCD: CPT | Mod: CPTII,S$GLB,, | Performed by: FAMILY MEDICINE

## 2020-10-05 PROCEDURE — 3008F PR BODY MASS INDEX (BMI) DOCUMENTED: ICD-10-PCS | Mod: CPTII,S$GLB,, | Performed by: FAMILY MEDICINE

## 2020-10-05 PROCEDURE — 99214 PR OFFICE/OUTPT VISIT, EST, LEVL IV, 30-39 MIN: ICD-10-PCS | Mod: 25,S$GLB,, | Performed by: FAMILY MEDICINE

## 2020-10-05 PROCEDURE — 1101F PT FALLS ASSESS-DOCD LE1/YR: CPT | Mod: CPTII,S$GLB,, | Performed by: FAMILY MEDICINE

## 2020-10-05 PROCEDURE — 99999 PR PBB SHADOW E&M-EST. PATIENT-LVL IV: ICD-10-PCS | Mod: PBBFAC,,, | Performed by: FAMILY MEDICINE

## 2020-10-05 PROCEDURE — 3075F PR MOST RECENT SYSTOLIC BLOOD PRESS GE 130-139MM HG: ICD-10-PCS | Mod: CPTII,S$GLB,, | Performed by: FAMILY MEDICINE

## 2020-10-05 PROCEDURE — G0008 ADMIN INFLUENZA VIRUS VAC: HCPCS | Mod: S$GLB,,, | Performed by: FAMILY MEDICINE

## 2020-10-05 PROCEDURE — 1126F AMNT PAIN NOTED NONE PRSNT: CPT | Mod: S$GLB,,, | Performed by: FAMILY MEDICINE

## 2020-10-05 PROCEDURE — 1126F PR PAIN SEVERITY QUANTIFIED, NO PAIN PRESENT: ICD-10-PCS | Mod: S$GLB,,, | Performed by: FAMILY MEDICINE

## 2020-10-05 PROCEDURE — 99999 PR PBB SHADOW E&M-EST. PATIENT-LVL IV: CPT | Mod: PBBFAC,,, | Performed by: FAMILY MEDICINE

## 2020-10-05 PROCEDURE — 90694 FLU VACCINE - QUADRIVALENT - ADJUVANTED: ICD-10-PCS | Mod: S$GLB,,, | Performed by: FAMILY MEDICINE

## 2020-10-05 PROCEDURE — 3079F DIAST BP 80-89 MM HG: CPT | Mod: CPTII,S$GLB,, | Performed by: FAMILY MEDICINE

## 2020-10-05 PROCEDURE — 90694 VACC AIIV4 NO PRSRV 0.5ML IM: CPT | Mod: S$GLB,,, | Performed by: FAMILY MEDICINE

## 2020-10-05 PROCEDURE — G0008 FLU VACCINE - QUADRIVALENT - ADJUVANTED: ICD-10-PCS | Mod: S$GLB,,, | Performed by: FAMILY MEDICINE

## 2020-10-05 PROCEDURE — 1159F PR MEDICATION LIST DOCUMENTED IN MEDICAL RECORD: ICD-10-PCS | Mod: S$GLB,,, | Performed by: FAMILY MEDICINE

## 2020-10-05 RX ORDER — AMLODIPINE AND BENAZEPRIL HYDROCHLORIDE 10; 20 MG/1; MG/1
1 CAPSULE ORAL DAILY
Qty: 90 CAPSULE | Refills: 3 | Status: SHIPPED | OUTPATIENT
Start: 2020-10-05 | End: 2021-03-26 | Stop reason: SDUPTHER

## 2020-10-05 RX ORDER — HYDROCHLOROTHIAZIDE 12.5 MG/1
12.5 TABLET ORAL DAILY
Qty: 30 TABLET | Refills: 11 | Status: SHIPPED | OUTPATIENT
Start: 2020-10-05 | End: 2021-03-26 | Stop reason: SDUPTHER

## 2020-10-05 RX ORDER — ATORVASTATIN CALCIUM 10 MG/1
10 TABLET, FILM COATED ORAL DAILY
Qty: 90 TABLET | Refills: 3 | Status: SHIPPED | OUTPATIENT
Start: 2020-10-05 | End: 2021-01-10

## 2020-10-05 NOTE — PROGRESS NOTES
Subjective:       Patient ID: Nasima Pacheco is a 70 y.o. female.    Chief Complaint: Follow-up and Hypertension    70 years old female who came to the clinic for blood pressure check.  Blood pressure today stable .  No chest pain, palpitation, orthopnea or PND.  Patient previously diagnosed with prediabetes.  No polyuria, polydipsia or polyphagia.  Last thyroid test was slightly elevated.  Patient also with left arm lipoma for the last couple of weeks.  Patient did not want surgical intervention right now.  Last cholesterol was normal.  Patient is requesting evaluation by ENT for chronic cerumen buildup.    Review of Systems   Constitutional: Negative.    HENT: Negative.    Eyes: Negative.    Respiratory: Negative.    Cardiovascular: Negative for chest pain, palpitations, leg swelling and claudication.   Gastrointestinal: Negative.    Endocrine: Negative for polydipsia, polyphagia and polyuria.   Genitourinary: Negative.    Musculoskeletal: Negative.    Neurological: Negative.    Psychiatric/Behavioral: Negative.          Objective:      Physical Exam  Constitutional:       General: She is not in acute distress.     Appearance: She is well-developed. She is not diaphoretic.   HENT:      Head: Normocephalic and atraumatic.      Right Ear: External ear normal.      Left Ear: External ear normal.      Nose: Nose normal.      Mouth/Throat:      Pharynx: No oropharyngeal exudate.   Eyes:      General: No scleral icterus.        Right eye: No discharge.         Left eye: No discharge.      Conjunctiva/sclera: Conjunctivae normal.      Pupils: Pupils are equal, round, and reactive to light.   Neck:      Musculoskeletal: Normal range of motion and neck supple.      Thyroid: No thyromegaly.      Vascular: No JVD.      Trachea: No tracheal deviation.   Cardiovascular:      Rate and Rhythm: Normal rate and regular rhythm.      Heart sounds: Normal heart sounds. No murmur. No friction rub. No gallop.    Pulmonary:       Effort: Pulmonary effort is normal. No respiratory distress.      Breath sounds: Normal breath sounds. No stridor. No wheezing or rales.   Chest:      Chest wall: No tenderness.   Abdominal:      General: Bowel sounds are normal. There is no distension.      Palpations: Abdomen is soft. There is no mass.      Tenderness: There is no abdominal tenderness. There is no guarding or rebound.   Musculoskeletal: Normal range of motion.         General: No tenderness.   Lymphadenopathy:      Cervical: No cervical adenopathy.   Skin:     General: Skin is warm and dry.      Coloration: Skin is not pale.      Findings: No erythema or rash.   Neurological:      Mental Status: She is alert and oriented to person, place, and time.      Cranial Nerves: No cranial nerve deficit.      Motor: No abnormal muscle tone.      Coordination: Coordination normal.      Deep Tendon Reflexes: Reflexes are normal and symmetric.   Psychiatric:         Behavior: Behavior normal.         Thought Content: Thought content normal.         Judgment: Judgment normal.         Assessment:       1. Essential hypertension    2. Dyslipidemia    3. Excessive cerumen in ear canal, unspecified laterality    4. Flu    5. Asymptomatic menopause    6. Prediabetes    7. Hypothyroidism, unspecified type    8. Hyperlipidemia, unspecified hyperlipidemia type    9. Lipoma of left upper extremity        Plan:         Nasima was seen today for follow-up and hypertension.    Diagnoses and all orders for this visit:    Essential hypertension  -     Comprehensive Metabolic Panel; Future  -     TSH; Future  -     T4, Free; Future  -     T3; Future  -     Lipid Panel; Future  -     amlodipine-benazepril 10-20mg (LOTREL) 10-20 mg per capsule; Take 1 capsule by mouth once daily.  -     hydroCHLOROthiazide (HYDRODIURIL) 12.5 MG Tab; Take 1 tablet (12.5 mg total) by mouth once daily.    Dyslipidemia  -     TSH; Future  -     atorvastatin (LIPITOR) 10 MG tablet; Take 1 tablet  (10 mg total) by mouth once daily.    Excessive cerumen in ear canal, unspecified laterality  -     Ambulatory referral/consult to ENT; Future    Flu  -     Influenza (FLUAD) - Quadrivalent (Adjuvanted) *Preferred* (65+) (PF)    Asymptomatic menopause  -     DXA Bone Density Spine And Hip; Future    Prediabetes  -     Hemoglobin A1C; Future  -     Comprehensive Metabolic Panel; Future    Hypothyroidism, unspecified type  -     TSH; Future  -     T4, Free; Future  -     T3; Future    Hyperlipidemia, unspecified hyperlipidemia type  -     atorvastatin (LIPITOR) 10 MG tablet; Take 1 tablet (10 mg total) by mouth once daily.    Lipoma of left upper extremity    Continue monitoring blood sugar at home,ADA diet.  Continue monitoring blood pressure at home, low sodium diet.

## 2020-10-08 ENCOUNTER — LAB VISIT (OUTPATIENT)
Dept: LAB | Facility: HOSPITAL | Age: 70
End: 2020-10-08
Attending: FAMILY MEDICINE
Payer: MEDICARE

## 2020-10-08 DIAGNOSIS — E78.5 DYSLIPIDEMIA: ICD-10-CM

## 2020-10-08 DIAGNOSIS — E03.9 HYPOTHYROIDISM, UNSPECIFIED TYPE: ICD-10-CM

## 2020-10-08 DIAGNOSIS — R73.03 PREDIABETES: ICD-10-CM

## 2020-10-08 DIAGNOSIS — I10 ESSENTIAL HYPERTENSION: ICD-10-CM

## 2020-10-08 LAB
ALBUMIN SERPL BCP-MCNC: 4.4 G/DL (ref 3.5–5.2)
ALP SERPL-CCNC: 109 U/L (ref 55–135)
ALT SERPL W/O P-5'-P-CCNC: 15 U/L (ref 10–44)
ANION GAP SERPL CALC-SCNC: 14 MMOL/L (ref 8–16)
AST SERPL-CCNC: 16 U/L (ref 10–40)
BILIRUB SERPL-MCNC: 0.9 MG/DL (ref 0.1–1)
BUN SERPL-MCNC: 11 MG/DL (ref 8–23)
CALCIUM SERPL-MCNC: 9.4 MG/DL (ref 8.7–10.5)
CHLORIDE SERPL-SCNC: 105 MMOL/L (ref 95–110)
CHOLEST SERPL-MCNC: 145 MG/DL (ref 120–199)
CHOLEST/HDLC SERPL: 3.2 {RATIO} (ref 2–5)
CO2 SERPL-SCNC: 24 MMOL/L (ref 23–29)
CREAT SERPL-MCNC: 0.7 MG/DL (ref 0.5–1.4)
EST. GFR  (AFRICAN AMERICAN): >60 ML/MIN/1.73 M^2
EST. GFR  (NON AFRICAN AMERICAN): >60 ML/MIN/1.73 M^2
GLUCOSE SERPL-MCNC: 98 MG/DL (ref 70–110)
HDLC SERPL-MCNC: 46 MG/DL (ref 40–75)
HDLC SERPL: 31.7 % (ref 20–50)
LDLC SERPL CALC-MCNC: 82.6 MG/DL (ref 63–159)
NONHDLC SERPL-MCNC: 99 MG/DL
POTASSIUM SERPL-SCNC: 3.7 MMOL/L (ref 3.5–5.1)
PROT SERPL-MCNC: 6.9 G/DL (ref 6–8.4)
SODIUM SERPL-SCNC: 143 MMOL/L (ref 136–145)
T3 SERPL-MCNC: 89 NG/DL (ref 60–180)
T4 FREE SERPL-MCNC: 1.01 NG/DL (ref 0.71–1.51)
TRIGL SERPL-MCNC: 82 MG/DL (ref 30–150)
TSH SERPL DL<=0.005 MIU/L-ACNC: 2.48 UIU/ML (ref 0.4–4)

## 2020-10-08 PROCEDURE — 36415 COLL VENOUS BLD VENIPUNCTURE: CPT | Mod: PO

## 2020-10-08 PROCEDURE — 80053 COMPREHEN METABOLIC PANEL: CPT

## 2020-10-08 PROCEDURE — 83036 HEMOGLOBIN GLYCOSYLATED A1C: CPT

## 2020-10-08 PROCEDURE — 84439 ASSAY OF FREE THYROXINE: CPT

## 2020-10-08 PROCEDURE — 84443 ASSAY THYROID STIM HORMONE: CPT

## 2020-10-08 PROCEDURE — 80061 LIPID PANEL: CPT

## 2020-10-08 PROCEDURE — 84480 ASSAY TRIIODOTHYRONINE (T3): CPT

## 2020-10-09 LAB
ESTIMATED AVG GLUCOSE: 120 MG/DL (ref 68–131)
HBA1C MFR BLD HPLC: 5.8 % (ref 4–5.6)

## 2020-10-23 ENCOUNTER — TELEPHONE (OUTPATIENT)
Dept: FAMILY MEDICINE | Facility: CLINIC | Age: 70
End: 2020-10-23

## 2020-10-30 ENCOUNTER — PATIENT MESSAGE (OUTPATIENT)
Dept: ADMINISTRATIVE | Facility: HOSPITAL | Age: 70
End: 2020-10-30

## 2020-11-05 ENCOUNTER — TELEPHONE (OUTPATIENT)
Dept: FAMILY MEDICINE | Facility: CLINIC | Age: 70
End: 2020-11-05

## 2020-11-05 DIAGNOSIS — Z12.31 ENCOUNTER FOR SCREENING MAMMOGRAM FOR MALIGNANT NEOPLASM OF BREAST: Primary | ICD-10-CM

## 2020-11-05 NOTE — TELEPHONE ENCOUNTER
----- Message from Clary Lewis MA sent at 11/5/2020  2:05 PM CST -----  Regarding: Please advise    ----- Message -----  From: Morelia Angeles  Sent: 11/5/2020   1:02 PM CST  To: Leoncio ARREDONDO Staff  Subject: Toyin [phone]                             Caller is requesting to schedule their annual screening mammogram appointment. Order is not listed in Epic.  Please enter order and contact patient to schedule.

## 2020-11-10 ENCOUNTER — PATIENT OUTREACH (OUTPATIENT)
Dept: ADMINISTRATIVE | Facility: OTHER | Age: 70
End: 2020-11-10

## 2020-11-10 NOTE — PROGRESS NOTES
Updates were requested from care everywhere.  Chart was reviewed for overdue Proactive [facility] Encounters (ANALY) topics (CRS, Breast Cancer Screening, Eye exam)  Health Maintenance has been updated.  LINKS not responding.

## 2020-11-24 ENCOUNTER — PES CALL (OUTPATIENT)
Dept: ADMINISTRATIVE | Facility: CLINIC | Age: 70
End: 2020-11-24

## 2020-11-28 DIAGNOSIS — Z12.11 COLON CANCER SCREENING: ICD-10-CM

## 2020-11-30 ENCOUNTER — OFFICE VISIT (OUTPATIENT)
Dept: OTOLARYNGOLOGY | Facility: CLINIC | Age: 70
End: 2020-11-30
Payer: MEDICARE

## 2020-11-30 VITALS — WEIGHT: 132.06 LBS | BODY MASS INDEX: 24.15 KG/M2

## 2020-11-30 DIAGNOSIS — H61.23 BILATERAL IMPACTED CERUMEN: Primary | ICD-10-CM

## 2020-11-30 DIAGNOSIS — H61.20 EXCESSIVE CERUMEN IN EAR CANAL, UNSPECIFIED LATERALITY: ICD-10-CM

## 2020-11-30 PROCEDURE — 1101F PT FALLS ASSESS-DOCD LE1/YR: CPT | Mod: CPTII,S$GLB,, | Performed by: OTOLARYNGOLOGY

## 2020-11-30 PROCEDURE — 99499 NO LOS: ICD-10-PCS | Mod: S$GLB,,, | Performed by: OTOLARYNGOLOGY

## 2020-11-30 PROCEDURE — 3008F BODY MASS INDEX DOCD: CPT | Mod: CPTII,S$GLB,, | Performed by: OTOLARYNGOLOGY

## 2020-11-30 PROCEDURE — 69210 REMOVE IMPACTED EAR WAX UNI: CPT | Mod: S$GLB,,, | Performed by: OTOLARYNGOLOGY

## 2020-11-30 PROCEDURE — 99999 PR PBB SHADOW E&M-EST. PATIENT-LVL III: CPT | Mod: PBBFAC,,, | Performed by: OTOLARYNGOLOGY

## 2020-11-30 PROCEDURE — 1126F PR PAIN SEVERITY QUANTIFIED, NO PAIN PRESENT: ICD-10-PCS | Mod: S$GLB,,, | Performed by: OTOLARYNGOLOGY

## 2020-11-30 PROCEDURE — 3288F PR FALLS RISK ASSESSMENT DOCUMENTED: ICD-10-PCS | Mod: CPTII,S$GLB,, | Performed by: OTOLARYNGOLOGY

## 2020-11-30 PROCEDURE — 1159F MED LIST DOCD IN RCRD: CPT | Mod: S$GLB,,, | Performed by: OTOLARYNGOLOGY

## 2020-11-30 PROCEDURE — 99499 UNLISTED E&M SERVICE: CPT | Mod: S$GLB,,, | Performed by: OTOLARYNGOLOGY

## 2020-11-30 PROCEDURE — 1159F PR MEDICATION LIST DOCUMENTED IN MEDICAL RECORD: ICD-10-PCS | Mod: S$GLB,,, | Performed by: OTOLARYNGOLOGY

## 2020-11-30 PROCEDURE — 1101F PR PT FALLS ASSESS DOC 0-1 FALLS W/OUT INJ PAST YR: ICD-10-PCS | Mod: CPTII,S$GLB,, | Performed by: OTOLARYNGOLOGY

## 2020-11-30 PROCEDURE — 3288F FALL RISK ASSESSMENT DOCD: CPT | Mod: CPTII,S$GLB,, | Performed by: OTOLARYNGOLOGY

## 2020-11-30 PROCEDURE — 3008F PR BODY MASS INDEX (BMI) DOCUMENTED: ICD-10-PCS | Mod: CPTII,S$GLB,, | Performed by: OTOLARYNGOLOGY

## 2020-11-30 PROCEDURE — 69210 PR REMOVAL IMPACTED CERUMEN REQUIRING INSTRUMENTATION, UNILATERAL: ICD-10-PCS | Mod: S$GLB,,, | Performed by: OTOLARYNGOLOGY

## 2020-11-30 PROCEDURE — 1126F AMNT PAIN NOTED NONE PRSNT: CPT | Mod: S$GLB,,, | Performed by: OTOLARYNGOLOGY

## 2020-11-30 PROCEDURE — 99999 PR PBB SHADOW E&M-EST. PATIENT-LVL III: ICD-10-PCS | Mod: PBBFAC,,, | Performed by: OTOLARYNGOLOGY

## 2020-11-30 NOTE — LETTER
November 30, 2020      Jose Fang MD  [address] [facility] [address]           [facility]  [facility]  [address]  [facility] [address]  Phone: [phone]  Fax: [phone]          Patient: Nasima Pacheco   MR Number: [id number]   YOB: 1950   Date of Visit: 11/30/2020       Dear Dr. Jose Fang:    Thank you for referring Nasima Pacheco to me for evaluation. Attached you will find relevant portions of my assessment and plan of care.    If you have questions, please do not hesitate to call me. I look forward to following Nasima Pacheco along with you.    Sincerely,    Antonio Pollard MD    Enclosure  CC:  No Recipients    If you would like to receive this communication electronically, please contact [email or web address] or [phone] to request more information on [employer] Direct [employer] access.    For providers and/or their staff who would like to refer a patient to [facility], please contact us through our one-stop-shop provider referral line, [facility], at [phone].    If you feel you have received this communication in error or would no longer like to receive these types of communications, please e-mail [email or web address]

## 2020-11-30 NOTE — PROGRESS NOTES
Has B CI.    L>>R.    Procedure Note:    Patient was brought to the minor procedure room and using the operating microscope the right ear canal  was cleaned of ceruminous debris. There was a significant cerumen impaction.  The forceps and suction were both used to perform this. Tympanic membrane intact. Pt tolerated well. There were no complications.    Procedure Note:    The patient was brought to the minor procedure room and placed under the operating microscope. Using a combination of suction, curettes and cup forceps the patient's cerumen impaction was removed. The tympanic membrane was evaluated and was unremarkable. The patient tolerated the procedure well. There were no complications.    P: RTC prn.          Answers for HPI/ROS submitted by the patient on 11/30/2020   None of these: Yes  None of these : Yes  Snoring?: Yes  None of these : Yes  None of these: Yes  None of these: Yes  back pain: Yes  None of these : Yes  None of these: Yes  None of these : Yes  None of these: Yes  None of these: Yes  None of these: Yes

## 2020-12-07 ENCOUNTER — HOSPITAL ENCOUNTER (OUTPATIENT)
Dept: RADIOLOGY | Facility: HOSPITAL | Age: 70
Discharge: HOME | End: 2020-12-07
Attending: FAMILY MEDICINE
Payer: MEDICARE

## 2020-12-07 DIAGNOSIS — Z12.31 ENCOUNTER FOR SCREENING MAMMOGRAM FOR MALIGNANT NEOPLASM OF BREAST: ICD-10-CM

## 2020-12-07 PROCEDURE — 77063 MAMMO DIGITAL SCREENING BILAT WITH TOMO: ICD-10-PCS | Mod: 26,,, | Performed by: RADIOLOGY

## 2020-12-07 PROCEDURE — 77067 MAMMO DIGITAL SCREENING BILAT WITH TOMO: ICD-10-PCS | Mod: 26,,, | Performed by: RADIOLOGY

## 2020-12-07 PROCEDURE — 77067 SCR MAMMO BI INCL CAD: CPT | Mod: 26,,, | Performed by: RADIOLOGY

## 2020-12-07 PROCEDURE — 77067 SCR MAMMO BI INCL CAD: CPT | Mod: TC,PO

## 2020-12-07 PROCEDURE — 77063 BREAST TOMOSYNTHESIS BI: CPT | Mod: 26,,, | Performed by: RADIOLOGY

## 2020-12-11 ENCOUNTER — PATIENT MESSAGE (OUTPATIENT)
Dept: FAMILY MEDICINE | Facility: CLINIC | Age: 70
End: 2020-12-11

## 2020-12-15 ENCOUNTER — PATIENT OUTREACH (OUTPATIENT)
Dept: ADMINISTRATIVE | Facility: OTHER | Age: 70
End: 2020-12-15

## 2020-12-16 NOTE — PROGRESS NOTES
Requested updates within Care Everywhere.  Patient's chart was reviewed for overdue ANALY topics.  Immunizations reconciled.

## 2020-12-17 ENCOUNTER — OFFICE VISIT (OUTPATIENT)
Dept: CARDIOLOGY | Facility: CLINIC | Age: 70
End: 2020-12-17
Payer: MEDICARE

## 2020-12-17 VITALS
SYSTOLIC BLOOD PRESSURE: 144 MMHG | BODY MASS INDEX: 23.74 KG/M2 | DIASTOLIC BLOOD PRESSURE: 68 MMHG | HEIGHT: 62 IN | HEART RATE: 75 BPM | WEIGHT: 129 LBS

## 2020-12-17 DIAGNOSIS — I10 ESSENTIAL HYPERTENSION: Primary | ICD-10-CM

## 2020-12-17 DIAGNOSIS — E78.5 HYPERLIPIDEMIA, UNSPECIFIED HYPERLIPIDEMIA TYPE: ICD-10-CM

## 2020-12-17 PROCEDURE — 3008F PR BODY MASS INDEX (BMI) DOCUMENTED: ICD-10-PCS | Mod: CPTII,S$GLB,, | Performed by: INTERNAL MEDICINE

## 2020-12-17 PROCEDURE — 99499 RISK ADDL DX/OHS AUDIT: ICD-10-PCS | Mod: S$GLB,,, | Performed by: INTERNAL MEDICINE

## 2020-12-17 PROCEDURE — 99213 OFFICE O/P EST LOW 20 MIN: CPT | Mod: S$GLB,,, | Performed by: INTERNAL MEDICINE

## 2020-12-17 PROCEDURE — 99213 PR OFFICE/OUTPT VISIT, EST, LEVL III, 20-29 MIN: ICD-10-PCS | Mod: S$GLB,,, | Performed by: INTERNAL MEDICINE

## 2020-12-17 PROCEDURE — 3077F SYST BP >= 140 MM HG: CPT | Mod: CPTII,S$GLB,, | Performed by: INTERNAL MEDICINE

## 2020-12-17 PROCEDURE — 1126F AMNT PAIN NOTED NONE PRSNT: CPT | Mod: S$GLB,,, | Performed by: INTERNAL MEDICINE

## 2020-12-17 PROCEDURE — 99999 PR PBB SHADOW E&M-EST. PATIENT-LVL III: ICD-10-PCS | Mod: PBBFAC,,, | Performed by: INTERNAL MEDICINE

## 2020-12-17 PROCEDURE — 1159F PR MEDICATION LIST DOCUMENTED IN MEDICAL RECORD: ICD-10-PCS | Mod: S$GLB,,, | Performed by: INTERNAL MEDICINE

## 2020-12-17 PROCEDURE — 3078F PR MOST RECENT DIASTOLIC BLOOD PRESSURE < 80 MM HG: ICD-10-PCS | Mod: CPTII,S$GLB,, | Performed by: INTERNAL MEDICINE

## 2020-12-17 PROCEDURE — 1126F PR PAIN SEVERITY QUANTIFIED, NO PAIN PRESENT: ICD-10-PCS | Mod: S$GLB,,, | Performed by: INTERNAL MEDICINE

## 2020-12-17 PROCEDURE — 99499 UNLISTED E&M SERVICE: CPT | Mod: S$GLB,,, | Performed by: INTERNAL MEDICINE

## 2020-12-17 PROCEDURE — 3078F DIAST BP <80 MM HG: CPT | Mod: CPTII,S$GLB,, | Performed by: INTERNAL MEDICINE

## 2020-12-17 PROCEDURE — 99999 PR PBB SHADOW E&M-EST. PATIENT-LVL III: CPT | Mod: PBBFAC,,, | Performed by: INTERNAL MEDICINE

## 2020-12-17 PROCEDURE — 3008F BODY MASS INDEX DOCD: CPT | Mod: CPTII,S$GLB,, | Performed by: INTERNAL MEDICINE

## 2020-12-17 PROCEDURE — 3077F PR MOST RECENT SYSTOLIC BLOOD PRESSURE >= 140 MM HG: ICD-10-PCS | Mod: CPTII,S$GLB,, | Performed by: INTERNAL MEDICINE

## 2020-12-17 PROCEDURE — 1159F MED LIST DOCD IN RCRD: CPT | Mod: S$GLB,,, | Performed by: INTERNAL MEDICINE

## 2020-12-17 NOTE — PROGRESS NOTES
Subjective:   Patient ID:  Nasima Pacheco is a 70 y.o. female who presents for follow up of Essential hypertension (6 month f/u )      HPI: Back for 6 month f/u after adding HCTZ 12.5.  She is doing well with no new symptoms or cardiovascular complaints and no change in exercise capacity.  He denies chest discomfort, WESTBROOK, palpitations, PND/orthopnea, and syncope.    She states that she has rare episodes of lightheadedness.  SBPs at home tend to be in the 130s.    2020 HPI: Routine 16 month f/u.  She is doing well with no new symptoms or cardiovascular complaints and no change in exercise capacity.  She denies chest discomfort, WESTBROOK, palpitations, PND/orthopnea, lightheadedness (except for two very brief instances) and syncope.     Watches Na+ intake scrupulously.     Takes medicines in the evening.     No F/C/cough/diarrhea/anosmia.           2019 HPI: Routine 15 month f/u.  She is doing well with no new symptoms or cardiovascular complaints and no change in exercise capacity.  She denies chest discomfort, WESTBROOK, palpitations, PND/orthopnea, lightheadedness and syncope.     She hasn't taken her medicines yet today.     Not exercising but doesn't sit still at home.     She's watching her Na+ intake.     2017 HPI: Back for routine f/u.  Did not get set up with digital HTN.  Very brief palpitations once.  Very brief lightheadedness once without coming close to losing consciousness (this happened in [place]).  She then says she had an episode where she passed out in [place] at a  where it was very hot and she had not been eating or drinking.       No angina or new WESTBROOK.  No significant palpitations.  No PND/orthopnea.     Oct 2016 HPI: Back for 3 month f/u.  Stopped taking HCTZ because it made her vomit.  BPs have been good at home but she did not yet enroll in digital HTN.     She denies chest discomfort, WESTBROOK, palpitations, PND/orthopnea, lightheadedness and syncope.     Stress level is better  though she still has some from stress from her son who is bed bound (he was born with hydrocephalus).     July 2016 HPI: Very pleasant woman who previously saw Dr. Arrieta. She's here because her BP has been higher than usual in the past couple of days. SBPs in the 150s-160s. She denies chest discomfort, WESTBROOK, palpitations, PND/orthopnea, lightheadedness and syncope. She has a little stress in her life - concerns about her son and daughter     Nov 2014 HPI (Meenakshi): 63 y.o. female from [facility] with HTN and HLD who comes for follow up of hypertension and dyslipidemia. She last saw Dr. Escalante in 12/2013. She is doing well and denies syncope, lightheadedness, chest discomfort, shortness of breath (rest or dyspnea on exertion), palpitations, PND/orthopnea, or lower extremity swelling. She stopped her lipitor her PCP and her lipid values recently were worse. Her BP has been well controlled.     Patient Active Problem List   Diagnosis    Hyperlipidemia    Hypertension    Generalized osteoarthritis    AR (allergic rhinitis)    Cough    Onychomycosis due to dermatophyte    Nuclear sclerotic cataract of left eye    Post-operative state    Pain in both hands       Current Outpatient Medications   Medication Sig    amlodipine-benazepril 10-20mg (LOTREL) 10-20 mg per capsule Take 1 capsule by mouth once daily.    atorvastatin (LIPITOR) 10 MG tablet Take 1 tablet (10 mg total) by mouth once daily.    calcium carbonate-vit D3-min 600 mg calcium- 400 unit Tab Take 1 tablet by mouth 2 (two) times daily.    hydroCHLOROthiazide (HYDRODIURIL) 12.5 MG Tab Take 1 tablet (12.5 mg total) by mouth once daily.     No current facility-administered medications for this visit.        Review of Systems   Constitution: Negative.   HENT: Negative.    Eyes: Negative.    Cardiovascular: Negative.  Negative for chest pain, dyspnea on exertion, near-syncope, orthopnea and palpitations.   Respiratory: Negative.  Negative for cough and  shortness of breath.    Endocrine: Negative.    Hematologic/Lymphatic: Negative.    Skin: Negative.    Musculoskeletal: Negative.    Gastrointestinal: Negative.    Genitourinary: Negative.    Neurological: Negative.    Psychiatric/Behavioral: Negative.      Objective:   Physical Exam   Constitutional: She is oriented to person, place, and time. She appears well-developed and well-nourished.   HENT:   Head: Normocephalic and atraumatic.   Mouth/Throat: Oropharynx is clear and moist.   Eyes: Conjunctivae and EOM are normal. No scleral icterus.   Neck: Normal range of motion. Neck supple. No JVD present.   Cardiovascular: Normal rate, regular rhythm, normal heart sounds and intact distal pulses. Exam reveals no gallop and no friction rub.   No murmur heard.  Pulmonary/Chest: Effort normal and breath sounds normal. She has no wheezes. She has no rales.   Abdominal: Soft. Bowel sounds are normal. She exhibits no distension. There is no abdominal tenderness.   Musculoskeletal: Normal range of motion.         General: No edema.   Neurological: She is alert and oriented to person, place, and time.   Skin: Skin is warm and dry. No rash noted. No erythema.   Psychiatric: She has a normal mood and affect. Her behavior is normal. Judgment and thought content normal.   Vitals reviewed.      Lab Results   Component Value Date    WBC 6.13 06/30/2020    HGB 13.4 06/30/2020    HCT 41.7 06/30/2020    MCV 94 06/30/2020     06/30/2020         Chemistry        Component Value Date/Time     10/08/2020 1014    K 3.7 10/08/2020 1014     10/08/2020 1014    CO2 24 10/08/2020 1014    BUN 11 10/08/2020 1014    CREATININE 0.7 10/08/2020 1014    GLU 98 10/08/2020 1014        Component Value Date/Time    CALCIUM 9.4 10/08/2020 1014    ALKPHOS 109 10/08/2020 1014    AST 16 10/08/2020 1014    ALT 15 10/08/2020 1014    BILITOT 0.9 10/08/2020 1014    ESTGFRAFRICA >60.0 10/08/2020 1014    EGFRNONAA >60.0 10/08/2020 1014             Lab Results   Component Value Date    CHOL 145 10/08/2020    CHOL 194 06/30/2020    CHOL 157 12/30/2019     Lab Results   Component Value Date    HDL 46 10/08/2020    HDL 48 06/30/2020    HDL 51 12/30/2019     Lab Results   Component Value Date    LDLCALC 82.6 10/08/2020    LDLCALC 126.2 06/30/2020    LDLCALC 93.4 12/30/2019     Lab Results   Component Value Date    TRIG 82 10/08/2020    TRIG 99 06/30/2020    TRIG 63 12/30/2019     Lab Results   Component Value Date    CHOLHDL 31.7 10/08/2020    CHOLHDL 24.7 06/30/2020    CHOLHDL 32.5 12/30/2019       Lab Results   Component Value Date    TSH 2.482 10/08/2020       Lab Results   Component Value Date    HGBA1C 5.8 (H) 10/08/2020       Assessment:     1. Essential hypertension    2. Hyperlipidemia, unspecified hyperlipidemia type        Plan:

## 2021-01-05 ENCOUNTER — PATIENT MESSAGE (OUTPATIENT)
Dept: ADMINISTRATIVE | Facility: HOSPITAL | Age: 71
End: 2021-01-05

## 2021-01-24 ENCOUNTER — PATIENT MESSAGE (OUTPATIENT)
Dept: ADMINISTRATIVE | Facility: HOSPITAL | Age: 71
End: 2021-01-24

## 2021-01-26 ENCOUNTER — PATIENT OUTREACH (OUTPATIENT)
Dept: ADMINISTRATIVE | Facility: HOSPITAL | Age: 71
End: 2021-01-26

## 2021-02-01 ENCOUNTER — TELEPHONE (OUTPATIENT)
Dept: FAMILY MEDICINE | Facility: CLINIC | Age: 71
End: 2021-02-01

## 2021-02-23 ENCOUNTER — PATIENT MESSAGE (OUTPATIENT)
Dept: RHEUMATOLOGY | Facility: CLINIC | Age: 71
End: 2021-02-23

## 2021-03-12 ENCOUNTER — PATIENT MESSAGE (OUTPATIENT)
Dept: ADMINISTRATIVE | Facility: HOSPITAL | Age: 71
End: 2021-03-12

## 2021-03-12 ENCOUNTER — PATIENT OUTREACH (OUTPATIENT)
Dept: ADMINISTRATIVE | Facility: HOSPITAL | Age: 71
End: 2021-03-12

## 2021-03-15 ENCOUNTER — LAB VISIT (OUTPATIENT)
Dept: LAB | Facility: HOSPITAL | Age: 71
End: 2021-03-15
Attending: FAMILY MEDICINE
Payer: MEDICARE

## 2021-03-15 DIAGNOSIS — Z12.11 COLON CANCER SCREENING: ICD-10-CM

## 2021-03-15 PROCEDURE — 82274 ASSAY TEST FOR BLOOD FECAL: CPT | Performed by: FAMILY MEDICINE

## 2021-03-16 LAB — HEMOCCULT STL QL IA: NEGATIVE

## 2021-03-26 ENCOUNTER — OFFICE VISIT (OUTPATIENT)
Dept: FAMILY MEDICINE | Facility: CLINIC | Age: 71
End: 2021-03-26
Payer: MEDICARE

## 2021-03-26 VITALS
HEART RATE: 86 BPM | OXYGEN SATURATION: 99 % | WEIGHT: 125.25 LBS | SYSTOLIC BLOOD PRESSURE: 120 MMHG | TEMPERATURE: 98 F | HEIGHT: 62 IN | BODY MASS INDEX: 23.05 KG/M2 | DIASTOLIC BLOOD PRESSURE: 70 MMHG

## 2021-03-26 DIAGNOSIS — Z78.0 ASYMPTOMATIC MENOPAUSE: ICD-10-CM

## 2021-03-26 DIAGNOSIS — I10 ESSENTIAL HYPERTENSION: ICD-10-CM

## 2021-03-26 DIAGNOSIS — R63.4 WEIGHT LOSS: ICD-10-CM

## 2021-03-26 DIAGNOSIS — R73.03 PREDIABETES: ICD-10-CM

## 2021-03-26 DIAGNOSIS — E78.5 DYSLIPIDEMIA: ICD-10-CM

## 2021-03-26 DIAGNOSIS — E78.5 HYPERLIPIDEMIA, UNSPECIFIED HYPERLIPIDEMIA TYPE: ICD-10-CM

## 2021-03-26 DIAGNOSIS — Z00.00 ANNUAL PHYSICAL EXAM: Primary | ICD-10-CM

## 2021-03-26 DIAGNOSIS — R10.13 EPIGASTRIC PAIN: ICD-10-CM

## 2021-03-26 PROCEDURE — 3288F PR FALLS RISK ASSESSMENT DOCUMENTED: ICD-10-PCS | Mod: CPTII,S$GLB,, | Performed by: FAMILY MEDICINE

## 2021-03-26 PROCEDURE — 99499 RISK ADDL DX/OHS AUDIT: ICD-10-PCS | Mod: S$GLB,,, | Performed by: FAMILY MEDICINE

## 2021-03-26 PROCEDURE — 1159F PR MEDICATION LIST DOCUMENTED IN MEDICAL RECORD: ICD-10-PCS | Mod: S$GLB,,, | Performed by: FAMILY MEDICINE

## 2021-03-26 PROCEDURE — 99999 PR PBB SHADOW E&M-EST. PATIENT-LVL IV: CPT | Mod: PBBFAC,,, | Performed by: FAMILY MEDICINE

## 2021-03-26 PROCEDURE — 3008F PR BODY MASS INDEX (BMI) DOCUMENTED: ICD-10-PCS | Mod: CPTII,S$GLB,, | Performed by: FAMILY MEDICINE

## 2021-03-26 PROCEDURE — 3074F SYST BP LT 130 MM HG: CPT | Mod: CPTII,S$GLB,, | Performed by: FAMILY MEDICINE

## 2021-03-26 PROCEDURE — 1159F MED LIST DOCD IN RCRD: CPT | Mod: S$GLB,,, | Performed by: FAMILY MEDICINE

## 2021-03-26 PROCEDURE — 1126F AMNT PAIN NOTED NONE PRSNT: CPT | Mod: S$GLB,,, | Performed by: FAMILY MEDICINE

## 2021-03-26 PROCEDURE — 1101F PR PT FALLS ASSESS DOC 0-1 FALLS W/OUT INJ PAST YR: ICD-10-PCS | Mod: CPTII,S$GLB,, | Performed by: FAMILY MEDICINE

## 2021-03-26 PROCEDURE — 99214 PR OFFICE/OUTPT VISIT, EST, LEVL IV, 30-39 MIN: ICD-10-PCS | Mod: S$GLB,,, | Performed by: FAMILY MEDICINE

## 2021-03-26 PROCEDURE — 1126F PR PAIN SEVERITY QUANTIFIED, NO PAIN PRESENT: ICD-10-PCS | Mod: S$GLB,,, | Performed by: FAMILY MEDICINE

## 2021-03-26 PROCEDURE — 99499 UNLISTED E&M SERVICE: CPT | Mod: S$GLB,,, | Performed by: FAMILY MEDICINE

## 2021-03-26 PROCEDURE — 99999 PR PBB SHADOW E&M-EST. PATIENT-LVL IV: ICD-10-PCS | Mod: PBBFAC,,, | Performed by: FAMILY MEDICINE

## 2021-03-26 PROCEDURE — 3288F FALL RISK ASSESSMENT DOCD: CPT | Mod: CPTII,S$GLB,, | Performed by: FAMILY MEDICINE

## 2021-03-26 PROCEDURE — 3074F PR MOST RECENT SYSTOLIC BLOOD PRESSURE < 130 MM HG: ICD-10-PCS | Mod: CPTII,S$GLB,, | Performed by: FAMILY MEDICINE

## 2021-03-26 PROCEDURE — 99214 OFFICE O/P EST MOD 30 MIN: CPT | Mod: S$GLB,,, | Performed by: FAMILY MEDICINE

## 2021-03-26 PROCEDURE — 3078F PR MOST RECENT DIASTOLIC BLOOD PRESSURE < 80 MM HG: ICD-10-PCS | Mod: CPTII,S$GLB,, | Performed by: FAMILY MEDICINE

## 2021-03-26 PROCEDURE — 1101F PT FALLS ASSESS-DOCD LE1/YR: CPT | Mod: CPTII,S$GLB,, | Performed by: FAMILY MEDICINE

## 2021-03-26 PROCEDURE — 3078F DIAST BP <80 MM HG: CPT | Mod: CPTII,S$GLB,, | Performed by: FAMILY MEDICINE

## 2021-03-26 PROCEDURE — 3008F BODY MASS INDEX DOCD: CPT | Mod: CPTII,S$GLB,, | Performed by: FAMILY MEDICINE

## 2021-03-26 RX ORDER — HYDROCHLOROTHIAZIDE 12.5 MG/1
12.5 TABLET ORAL DAILY
Qty: 90 TABLET | Refills: 3 | Status: SHIPPED | OUTPATIENT
Start: 2021-03-26 | End: 2022-03-29 | Stop reason: SDUPTHER

## 2021-03-26 RX ORDER — AMLODIPINE AND BENAZEPRIL HYDROCHLORIDE 10; 20 MG/1; MG/1
1 CAPSULE ORAL DAILY
Qty: 90 CAPSULE | Refills: 3 | Status: SHIPPED | OUTPATIENT
Start: 2021-03-26 | End: 2021-10-13

## 2021-03-26 RX ORDER — ATORVASTATIN CALCIUM 10 MG/1
10 TABLET, FILM COATED ORAL DAILY
Qty: 90 TABLET | Refills: 3 | Status: SHIPPED | OUTPATIENT
Start: 2021-03-26 | End: 2022-03-21

## 2021-04-05 ENCOUNTER — PATIENT MESSAGE (OUTPATIENT)
Dept: FAMILY MEDICINE | Facility: CLINIC | Age: 71
End: 2021-04-05

## 2021-04-05 ENCOUNTER — HOSPITAL ENCOUNTER (OUTPATIENT)
Dept: RADIOLOGY | Facility: HOSPITAL | Age: 71
Discharge: HOME | End: 2021-04-05
Attending: FAMILY MEDICINE
Payer: MEDICARE

## 2021-04-05 DIAGNOSIS — R10.13 EPIGASTRIC PAIN: ICD-10-CM

## 2021-04-05 PROCEDURE — 74177 CT ABD & PELVIS W/CONTRAST: CPT | Mod: 26,,, | Performed by: RADIOLOGY

## 2021-04-05 PROCEDURE — 74177 CT ABD & PELVIS W/CONTRAST: CPT | Mod: TC

## 2021-04-05 PROCEDURE — 25500020 PHARM REV CODE 255: Performed by: FAMILY MEDICINE

## 2021-04-05 PROCEDURE — 74177 CT ABDOMEN PELVIS WITH CONTRAST: ICD-10-PCS | Mod: 26,,, | Performed by: RADIOLOGY

## 2021-04-05 RX ADMIN — IOHEXOL 15 ML: 350 INJECTION, SOLUTION INTRAVENOUS at 02:04

## 2021-04-05 RX ADMIN — IOHEXOL 75 ML: 350 INJECTION, SOLUTION INTRAVENOUS at 02:04

## 2021-04-11 ENCOUNTER — TELEPHONE (OUTPATIENT)
Dept: FAMILY MEDICINE | Facility: CLINIC | Age: 71
End: 2021-04-11

## 2021-04-11 DIAGNOSIS — R10.13 EPIGASTRIC PAIN: ICD-10-CM

## 2021-04-11 DIAGNOSIS — K80.20 GALLSTONES: Primary | ICD-10-CM

## 2021-05-18 ENCOUNTER — TELEPHONE (OUTPATIENT)
Dept: ADMINISTRATIVE | Facility: OTHER | Age: 71
End: 2021-05-18

## 2021-06-02 ENCOUNTER — OFFICE VISIT (OUTPATIENT)
Dept: SURGERY | Facility: CLINIC | Age: 71
End: 2021-06-02
Payer: MEDICARE

## 2021-06-02 VITALS
DIASTOLIC BLOOD PRESSURE: 74 MMHG | WEIGHT: 127.75 LBS | BODY MASS INDEX: 23.51 KG/M2 | SYSTOLIC BLOOD PRESSURE: 143 MMHG | HEIGHT: 62 IN | HEART RATE: 81 BPM

## 2021-06-02 DIAGNOSIS — M15.9 GENERALIZED OSTEOARTHRITIS: ICD-10-CM

## 2021-06-02 DIAGNOSIS — I10 ESSENTIAL HYPERTENSION: ICD-10-CM

## 2021-06-02 DIAGNOSIS — J30.9 ALLERGIC RHINITIS, UNSPECIFIED SEASONALITY, UNSPECIFIED TRIGGER: ICD-10-CM

## 2021-06-02 DIAGNOSIS — K80.20 GALLSTONES: Primary | ICD-10-CM

## 2021-06-02 DIAGNOSIS — E78.5 HYPERLIPIDEMIA, UNSPECIFIED HYPERLIPIDEMIA TYPE: ICD-10-CM

## 2021-06-02 DIAGNOSIS — R10.13 EPIGASTRIC PAIN: ICD-10-CM

## 2021-06-02 PROCEDURE — 3008F PR BODY MASS INDEX (BMI) DOCUMENTED: ICD-10-PCS | Mod: CPTII,S$GLB,, | Performed by: SURGERY

## 2021-06-02 PROCEDURE — 3008F BODY MASS INDEX DOCD: CPT | Mod: CPTII,S$GLB,, | Performed by: SURGERY

## 2021-06-02 PROCEDURE — 1159F PR MEDICATION LIST DOCUMENTED IN MEDICAL RECORD: ICD-10-PCS | Mod: S$GLB,,, | Performed by: SURGERY

## 2021-06-02 PROCEDURE — 1101F PR PT FALLS ASSESS DOC 0-1 FALLS W/OUT INJ PAST YR: ICD-10-PCS | Mod: CPTII,S$GLB,, | Performed by: SURGERY

## 2021-06-02 PROCEDURE — 3288F FALL RISK ASSESSMENT DOCD: CPT | Mod: CPTII,S$GLB,, | Performed by: SURGERY

## 2021-06-02 PROCEDURE — 99203 PR OFFICE/OUTPT VISIT, NEW, LEVL III, 30-44 MIN: ICD-10-PCS | Mod: S$GLB,,, | Performed by: SURGERY

## 2021-06-02 PROCEDURE — 1159F MED LIST DOCD IN RCRD: CPT | Mod: S$GLB,,, | Performed by: SURGERY

## 2021-06-02 PROCEDURE — 1101F PT FALLS ASSESS-DOCD LE1/YR: CPT | Mod: CPTII,S$GLB,, | Performed by: SURGERY

## 2021-06-02 PROCEDURE — 99499 RISK ADDL DX/OHS AUDIT: ICD-10-PCS | Mod: S$GLB,,, | Performed by: SURGERY

## 2021-06-02 PROCEDURE — 1126F PR PAIN SEVERITY QUANTIFIED, NO PAIN PRESENT: ICD-10-PCS | Mod: S$GLB,,, | Performed by: SURGERY

## 2021-06-02 PROCEDURE — 99999 PR PBB SHADOW E&M-EST. PATIENT-LVL III: ICD-10-PCS | Mod: PBBFAC,,, | Performed by: SURGERY

## 2021-06-02 PROCEDURE — 99999 PR PBB SHADOW E&M-EST. PATIENT-LVL III: CPT | Mod: PBBFAC,,, | Performed by: SURGERY

## 2021-06-02 PROCEDURE — 99499 UNLISTED E&M SERVICE: CPT | Mod: S$GLB,,, | Performed by: SURGERY

## 2021-06-02 PROCEDURE — 1126F AMNT PAIN NOTED NONE PRSNT: CPT | Mod: S$GLB,,, | Performed by: SURGERY

## 2021-06-02 PROCEDURE — 3288F PR FALLS RISK ASSESSMENT DOCUMENTED: ICD-10-PCS | Mod: CPTII,S$GLB,, | Performed by: SURGERY

## 2021-06-02 PROCEDURE — 99203 OFFICE O/P NEW LOW 30 MIN: CPT | Mod: S$GLB,,, | Performed by: SURGERY

## 2021-06-14 ENCOUNTER — HOSPITAL ENCOUNTER (OUTPATIENT)
Dept: RADIOLOGY | Facility: CLINIC | Age: 71
Discharge: HOME | End: 2021-06-14
Attending: FAMILY MEDICINE
Payer: MEDICARE

## 2021-06-14 DIAGNOSIS — Z78.0 ASYMPTOMATIC MENOPAUSE: ICD-10-CM

## 2021-06-14 DIAGNOSIS — Z00.00 ANNUAL PHYSICAL EXAM: ICD-10-CM

## 2021-06-14 PROCEDURE — 77080 DEXA BONE DENSITY SPINE HIP: ICD-10-PCS | Mod: 26,,, | Performed by: INTERNAL MEDICINE

## 2021-06-14 PROCEDURE — 77080 DXA BONE DENSITY AXIAL: CPT | Mod: 26,,, | Performed by: INTERNAL MEDICINE

## 2021-06-14 PROCEDURE — 77080 DXA BONE DENSITY AXIAL: CPT | Mod: TC

## 2021-06-20 ENCOUNTER — TELEPHONE (OUTPATIENT)
Dept: FAMILY MEDICINE | Facility: CLINIC | Age: 71
End: 2021-06-20

## 2021-06-20 DIAGNOSIS — M81.0 SENILE OSTEOPOROSIS: Primary | ICD-10-CM

## 2021-06-20 RX ORDER — ALENDRONATE SODIUM 70 MG/1
70 TABLET ORAL
Qty: 12 TABLET | Refills: 3 | Status: SHIPPED | OUTPATIENT
Start: 2021-06-20 | End: 2022-03-29

## 2021-06-30 ENCOUNTER — PATIENT MESSAGE (OUTPATIENT)
Dept: FAMILY MEDICINE | Facility: CLINIC | Age: 71
End: 2021-06-30

## 2021-07-02 ENCOUNTER — TELEPHONE (OUTPATIENT)
Dept: FAMILY MEDICINE | Facility: CLINIC | Age: 71
End: 2021-07-02

## 2021-07-02 DIAGNOSIS — F41.9 ANXIETY: Primary | ICD-10-CM

## 2021-07-02 RX ORDER — ALPRAZOLAM 0.25 MG/1
0.25 TABLET ORAL 2 TIMES DAILY PRN
Qty: 30 TABLET | Refills: 0 | Status: SHIPPED | OUTPATIENT
Start: 2021-07-02 | End: 2021-09-29

## 2021-08-06 ENCOUNTER — LAB VISIT (OUTPATIENT)
Dept: LAB | Facility: HOSPITAL | Age: 71
End: 2021-08-06
Payer: MEDICARE

## 2021-08-06 ENCOUNTER — OFFICE VISIT (OUTPATIENT)
Dept: INTERNAL MEDICINE | Facility: CLINIC | Age: 71
End: 2021-08-06
Payer: MEDICARE

## 2021-08-06 VITALS
SYSTOLIC BLOOD PRESSURE: 142 MMHG | TEMPERATURE: 98 F | BODY MASS INDEX: 24.15 KG/M2 | HEIGHT: 61 IN | OXYGEN SATURATION: 99 % | WEIGHT: 127.88 LBS | HEART RATE: 82 BPM | DIASTOLIC BLOOD PRESSURE: 72 MMHG

## 2021-08-06 DIAGNOSIS — R10.2 SUPRAPUBIC PAIN: ICD-10-CM

## 2021-08-06 DIAGNOSIS — R10.2 SUPRAPUBIC PAIN: Primary | ICD-10-CM

## 2021-08-06 DIAGNOSIS — R31.9 HEMATURIA, UNSPECIFIED TYPE: ICD-10-CM

## 2021-08-06 LAB
ALBUMIN SERPL BCP-MCNC: 4.5 G/DL (ref 3.5–5.2)
ALP SERPL-CCNC: 116 U/L (ref 55–135)
ALT SERPL W/O P-5'-P-CCNC: 12 U/L (ref 10–44)
ANION GAP SERPL CALC-SCNC: 11 MMOL/L (ref 8–16)
AST SERPL-CCNC: 15 U/L (ref 10–40)
BASOPHILS # BLD AUTO: 0.03 K/UL (ref 0–0.2)
BASOPHILS NFR BLD: 0.4 % (ref 0–1.9)
BILIRUB SERPL-MCNC: 1.2 MG/DL (ref 0.1–1)
BILIRUB SERPL-MCNC: NORMAL MG/DL
BLOOD URINE, POC: 250
BUN SERPL-MCNC: 12 MG/DL (ref 8–23)
CALCIUM SERPL-MCNC: 9.9 MG/DL (ref 8.7–10.5)
CHLORIDE SERPL-SCNC: 105 MMOL/L (ref 95–110)
CLARITY, POC UA: CLEAR
CO2 SERPL-SCNC: 26 MMOL/L (ref 23–29)
COLOR, POC UA: NORMAL
CREAT SERPL-MCNC: 0.6 MG/DL (ref 0.5–1.4)
DIFFERENTIAL METHOD: NORMAL
EOSINOPHIL # BLD AUTO: 0.1 K/UL (ref 0–0.5)
EOSINOPHIL NFR BLD: 0.9 % (ref 0–8)
ERYTHROCYTE [DISTWIDTH] IN BLOOD BY AUTOMATED COUNT: 12.9 % (ref 11.5–14.5)
EST. GFR  (AFRICAN AMERICAN): >60 ML/MIN/1.73 M^2
EST. GFR  (NON AFRICAN AMERICAN): >60 ML/MIN/1.73 M^2
GLUCOSE SERPL-MCNC: 105 MG/DL (ref 70–110)
GLUCOSE UR QL STRIP: NORMAL
HCT VFR BLD AUTO: 38.8 % (ref 37–48.5)
HGB BLD-MCNC: 13.3 G/DL (ref 12–16)
IMM GRANULOCYTES # BLD AUTO: 0.03 K/UL (ref 0–0.04)
IMM GRANULOCYTES NFR BLD AUTO: 0.4 % (ref 0–0.5)
KETONES UR QL STRIP: NORMAL
LEUKOCYTE ESTERASE URINE, POC: NORMAL
LIPASE SERPL-CCNC: 15 U/L (ref 4–60)
LYMPHOCYTES # BLD AUTO: 1.8 K/UL (ref 1–4.8)
LYMPHOCYTES NFR BLD: 26.4 % (ref 18–48)
MCH RBC QN AUTO: 30.3 PG (ref 27–31)
MCHC RBC AUTO-ENTMCNC: 34.3 G/DL (ref 32–36)
MCV RBC AUTO: 88 FL (ref 82–98)
MONOCYTES # BLD AUTO: 0.6 K/UL (ref 0.3–1)
MONOCYTES NFR BLD: 8.6 % (ref 4–15)
NEUTROPHILS # BLD AUTO: 4.3 K/UL (ref 1.8–7.7)
NEUTROPHILS NFR BLD: 63.3 % (ref 38–73)
NITRITE, POC UA: NORMAL
NRBC BLD-RTO: 0 /100 WBC
PH, POC UA: 5
PLATELET # BLD AUTO: 219 K/UL (ref 150–450)
PMV BLD AUTO: 11 FL (ref 9.2–12.9)
POTASSIUM SERPL-SCNC: 3.9 MMOL/L (ref 3.5–5.1)
PROT SERPL-MCNC: 7.2 G/DL (ref 6–8.4)
PROTEIN, POC: NORMAL
RBC # BLD AUTO: 4.39 M/UL (ref 4–5.4)
SODIUM SERPL-SCNC: 142 MMOL/L (ref 136–145)
SPECIFIC GRAVITY, POC UA: 1.02
UROBILINOGEN, POC UA: NORMAL
WBC # BLD AUTO: 6.85 K/UL (ref 3.9–12.7)

## 2021-08-06 PROCEDURE — 88112 PR  CYTOPATH, CELL ENHANCE TECH: ICD-10-PCS | Mod: 26,,, | Performed by: PATHOLOGY

## 2021-08-06 PROCEDURE — 88112 CYTOPATH CELL ENHANCE TECH: CPT | Mod: 26,,, | Performed by: PATHOLOGY

## 2021-08-06 PROCEDURE — 3288F PR FALLS RISK ASSESSMENT DOCUMENTED: ICD-10-PCS | Mod: CPTII,S$GLB,, | Performed by: INTERNAL MEDICINE

## 2021-08-06 PROCEDURE — 85025 COMPLETE CBC W/AUTO DIFF WBC: CPT | Performed by: INTERNAL MEDICINE

## 2021-08-06 PROCEDURE — 3078F DIAST BP <80 MM HG: CPT | Mod: CPTII,S$GLB,, | Performed by: INTERNAL MEDICINE

## 2021-08-06 PROCEDURE — 3288F FALL RISK ASSESSMENT DOCD: CPT | Mod: CPTII,S$GLB,, | Performed by: INTERNAL MEDICINE

## 2021-08-06 PROCEDURE — 1101F PR PT FALLS ASSESS DOC 0-1 FALLS W/OUT INJ PAST YR: ICD-10-PCS | Mod: CPTII,S$GLB,, | Performed by: INTERNAL MEDICINE

## 2021-08-06 PROCEDURE — 3077F PR MOST RECENT SYSTOLIC BLOOD PRESSURE >= 140 MM HG: ICD-10-PCS | Mod: CPTII,S$GLB,, | Performed by: INTERNAL MEDICINE

## 2021-08-06 PROCEDURE — 1125F AMNT PAIN NOTED PAIN PRSNT: CPT | Mod: CPTII,S$GLB,, | Performed by: INTERNAL MEDICINE

## 2021-08-06 PROCEDURE — 81002 URINALYSIS NONAUTO W/O SCOPE: CPT | Mod: S$GLB,,, | Performed by: INTERNAL MEDICINE

## 2021-08-06 PROCEDURE — 3044F HG A1C LEVEL LT 7.0%: CPT | Mod: CPTII,S$GLB,, | Performed by: INTERNAL MEDICINE

## 2021-08-06 PROCEDURE — 80053 COMPREHEN METABOLIC PANEL: CPT | Performed by: INTERNAL MEDICINE

## 2021-08-06 PROCEDURE — 99213 PR OFFICE/OUTPT VISIT, EST, LEVL III, 20-29 MIN: ICD-10-PCS | Mod: S$GLB,,, | Performed by: INTERNAL MEDICINE

## 2021-08-06 PROCEDURE — 3078F PR MOST RECENT DIASTOLIC BLOOD PRESSURE < 80 MM HG: ICD-10-PCS | Mod: CPTII,S$GLB,, | Performed by: INTERNAL MEDICINE

## 2021-08-06 PROCEDURE — 1101F PT FALLS ASSESS-DOCD LE1/YR: CPT | Mod: CPTII,S$GLB,, | Performed by: INTERNAL MEDICINE

## 2021-08-06 PROCEDURE — 83690 ASSAY OF LIPASE: CPT | Performed by: INTERNAL MEDICINE

## 2021-08-06 PROCEDURE — 1160F PR REVIEW ALL MEDS BY PRESCRIBER/CLIN PHARMACIST DOCUMENTED: ICD-10-PCS | Mod: CPTII,S$GLB,, | Performed by: INTERNAL MEDICINE

## 2021-08-06 PROCEDURE — 99999 PR PBB SHADOW E&M-EST. PATIENT-LVL IV: ICD-10-PCS | Mod: PBBFAC,,, | Performed by: INTERNAL MEDICINE

## 2021-08-06 PROCEDURE — 81002 POCT URINE DIPSTICK WITHOUT MICROSCOPE: ICD-10-PCS | Mod: S$GLB,,, | Performed by: INTERNAL MEDICINE

## 2021-08-06 PROCEDURE — 99999 PR PBB SHADOW E&M-EST. PATIENT-LVL IV: CPT | Mod: PBBFAC,,, | Performed by: INTERNAL MEDICINE

## 2021-08-06 PROCEDURE — 3008F BODY MASS INDEX DOCD: CPT | Mod: CPTII,S$GLB,, | Performed by: INTERNAL MEDICINE

## 2021-08-06 PROCEDURE — 3077F SYST BP >= 140 MM HG: CPT | Mod: CPTII,S$GLB,, | Performed by: INTERNAL MEDICINE

## 2021-08-06 PROCEDURE — 88112 CYTOPATH CELL ENHANCE TECH: CPT | Performed by: PATHOLOGY

## 2021-08-06 PROCEDURE — 3044F PR MOST RECENT HEMOGLOBIN A1C LEVEL <7.0%: ICD-10-PCS | Mod: CPTII,S$GLB,, | Performed by: INTERNAL MEDICINE

## 2021-08-06 PROCEDURE — 36415 COLL VENOUS BLD VENIPUNCTURE: CPT | Performed by: INTERNAL MEDICINE

## 2021-08-06 PROCEDURE — 1159F MED LIST DOCD IN RCRD: CPT | Mod: CPTII,S$GLB,, | Performed by: INTERNAL MEDICINE

## 2021-08-06 PROCEDURE — 3008F PR BODY MASS INDEX (BMI) DOCUMENTED: ICD-10-PCS | Mod: CPTII,S$GLB,, | Performed by: INTERNAL MEDICINE

## 2021-08-06 PROCEDURE — 1159F PR MEDICATION LIST DOCUMENTED IN MEDICAL RECORD: ICD-10-PCS | Mod: CPTII,S$GLB,, | Performed by: INTERNAL MEDICINE

## 2021-08-06 PROCEDURE — 1125F PR PAIN SEVERITY QUANTIFIED, PAIN PRESENT: ICD-10-PCS | Mod: CPTII,S$GLB,, | Performed by: INTERNAL MEDICINE

## 2021-08-06 PROCEDURE — 99213 OFFICE O/P EST LOW 20 MIN: CPT | Mod: S$GLB,,, | Performed by: INTERNAL MEDICINE

## 2021-08-06 PROCEDURE — 1160F RVW MEDS BY RX/DR IN RCRD: CPT | Mod: CPTII,S$GLB,, | Performed by: INTERNAL MEDICINE

## 2021-08-06 RX ORDER — OXYBUTYNIN CHLORIDE 10 MG/1
10 TABLET, EXTENDED RELEASE ORAL DAILY
Qty: 30 TABLET | Refills: 11 | Status: SHIPPED | OUTPATIENT
Start: 2021-08-06 | End: 2022-02-25

## 2021-08-09 ENCOUNTER — PATIENT OUTREACH (OUTPATIENT)
Dept: ADMINISTRATIVE | Facility: OTHER | Age: 71
End: 2021-08-09

## 2021-08-10 LAB
FINAL PATHOLOGIC DIAGNOSIS: NORMAL
Lab: NORMAL

## 2021-08-11 ENCOUNTER — PATIENT MESSAGE (OUTPATIENT)
Dept: INTERNAL MEDICINE | Facility: CLINIC | Age: 71
End: 2021-08-11

## 2021-08-26 ENCOUNTER — OFFICE VISIT (OUTPATIENT)
Dept: UROLOGY | Facility: CLINIC | Age: 71
End: 2021-08-26
Payer: MEDICARE

## 2021-08-26 VITALS — BODY MASS INDEX: 22.92 KG/M2 | WEIGHT: 124.56 LBS | HEIGHT: 62 IN

## 2021-08-26 DIAGNOSIS — R31.29 MICROSCOPIC HEMATURIA: Primary | ICD-10-CM

## 2021-08-26 DIAGNOSIS — R39.15 URGENCY OF URINATION: ICD-10-CM

## 2021-08-26 DIAGNOSIS — R10.2 SUPRAPUBIC PAIN: ICD-10-CM

## 2021-08-26 LAB
MICROSCOPIC COMMENT: NORMAL
RBC #/AREA URNS AUTO: 2 /HPF (ref 0–4)
SQUAMOUS #/AREA URNS AUTO: 0 /HPF
WBC #/AREA URNS AUTO: 0 /HPF (ref 0–5)

## 2021-08-26 PROCEDURE — 3008F PR BODY MASS INDEX (BMI) DOCUMENTED: ICD-10-PCS | Mod: CPTII,S$GLB,, | Performed by: PHYSICIAN ASSISTANT

## 2021-08-26 PROCEDURE — 99999 PR PBB SHADOW E&M-EST. PATIENT-LVL III: ICD-10-PCS | Mod: PBBFAC,,, | Performed by: PHYSICIAN ASSISTANT

## 2021-08-26 PROCEDURE — 1160F RVW MEDS BY RX/DR IN RCRD: CPT | Mod: CPTII,S$GLB,, | Performed by: PHYSICIAN ASSISTANT

## 2021-08-26 PROCEDURE — 1159F PR MEDICATION LIST DOCUMENTED IN MEDICAL RECORD: ICD-10-PCS | Mod: CPTII,S$GLB,, | Performed by: PHYSICIAN ASSISTANT

## 2021-08-26 PROCEDURE — 51798 PR MEAS,POST-VOID RES,US,NON-IMAGING: ICD-10-PCS | Mod: S$GLB,,, | Performed by: PHYSICIAN ASSISTANT

## 2021-08-26 PROCEDURE — 99203 OFFICE O/P NEW LOW 30 MIN: CPT | Mod: S$GLB,,, | Performed by: PHYSICIAN ASSISTANT

## 2021-08-26 PROCEDURE — 99999 PR PBB SHADOW E&M-EST. PATIENT-LVL III: CPT | Mod: PBBFAC,,, | Performed by: PHYSICIAN ASSISTANT

## 2021-08-26 PROCEDURE — 99203 PR OFFICE/OUTPT VISIT, NEW, LEVL III, 30-44 MIN: ICD-10-PCS | Mod: S$GLB,,, | Performed by: PHYSICIAN ASSISTANT

## 2021-08-26 PROCEDURE — 81001 URINALYSIS AUTO W/SCOPE: CPT | Performed by: PHYSICIAN ASSISTANT

## 2021-08-26 PROCEDURE — 3044F PR MOST RECENT HEMOGLOBIN A1C LEVEL <7.0%: ICD-10-PCS | Mod: CPTII,S$GLB,, | Performed by: PHYSICIAN ASSISTANT

## 2021-08-26 PROCEDURE — 1101F PT FALLS ASSESS-DOCD LE1/YR: CPT | Mod: CPTII,S$GLB,, | Performed by: PHYSICIAN ASSISTANT

## 2021-08-26 PROCEDURE — 3288F PR FALLS RISK ASSESSMENT DOCUMENTED: ICD-10-PCS | Mod: CPTII,S$GLB,, | Performed by: PHYSICIAN ASSISTANT

## 2021-08-26 PROCEDURE — 3044F HG A1C LEVEL LT 7.0%: CPT | Mod: CPTII,S$GLB,, | Performed by: PHYSICIAN ASSISTANT

## 2021-08-26 PROCEDURE — 1160F PR REVIEW ALL MEDS BY PRESCRIBER/CLIN PHARMACIST DOCUMENTED: ICD-10-PCS | Mod: CPTII,S$GLB,, | Performed by: PHYSICIAN ASSISTANT

## 2021-08-26 PROCEDURE — 51798 US URINE CAPACITY MEASURE: CPT | Mod: S$GLB,,, | Performed by: PHYSICIAN ASSISTANT

## 2021-08-26 PROCEDURE — 1159F MED LIST DOCD IN RCRD: CPT | Mod: CPTII,S$GLB,, | Performed by: PHYSICIAN ASSISTANT

## 2021-08-26 PROCEDURE — 3008F BODY MASS INDEX DOCD: CPT | Mod: CPTII,S$GLB,, | Performed by: PHYSICIAN ASSISTANT

## 2021-08-26 PROCEDURE — 1101F PR PT FALLS ASSESS DOC 0-1 FALLS W/OUT INJ PAST YR: ICD-10-PCS | Mod: CPTII,S$GLB,, | Performed by: PHYSICIAN ASSISTANT

## 2021-08-26 PROCEDURE — 3288F FALL RISK ASSESSMENT DOCD: CPT | Mod: CPTII,S$GLB,, | Performed by: PHYSICIAN ASSISTANT

## 2021-09-29 ENCOUNTER — OFFICE VISIT (OUTPATIENT)
Dept: FAMILY MEDICINE | Facility: CLINIC | Age: 71
End: 2021-09-29
Payer: MEDICARE

## 2021-09-29 ENCOUNTER — LAB VISIT (OUTPATIENT)
Dept: LAB | Facility: HOSPITAL | Age: 71
End: 2021-09-29
Attending: FAMILY MEDICINE
Payer: MEDICARE

## 2021-09-29 VITALS
SYSTOLIC BLOOD PRESSURE: 128 MMHG | WEIGHT: 125.69 LBS | BODY MASS INDEX: 23.13 KG/M2 | OXYGEN SATURATION: 98 % | DIASTOLIC BLOOD PRESSURE: 72 MMHG | HEIGHT: 62 IN | HEART RATE: 74 BPM

## 2021-09-29 DIAGNOSIS — I10 ESSENTIAL HYPERTENSION: ICD-10-CM

## 2021-09-29 DIAGNOSIS — E78.5 DYSLIPIDEMIA: ICD-10-CM

## 2021-09-29 DIAGNOSIS — I10 ESSENTIAL HYPERTENSION: Primary | ICD-10-CM

## 2021-09-29 DIAGNOSIS — H81.13 BENIGN PAROXYSMAL POSITIONAL VERTIGO DUE TO BILATERAL VESTIBULAR DISORDER: ICD-10-CM

## 2021-09-29 LAB
ALBUMIN SERPL BCP-MCNC: 4.5 G/DL (ref 3.5–5.2)
ALP SERPL-CCNC: 103 U/L (ref 55–135)
ALT SERPL W/O P-5'-P-CCNC: 15 U/L (ref 10–44)
ANION GAP SERPL CALC-SCNC: 13 MMOL/L (ref 8–16)
AST SERPL-CCNC: 18 U/L (ref 10–40)
BASOPHILS # BLD AUTO: 0.03 K/UL (ref 0–0.2)
BASOPHILS NFR BLD: 0.6 % (ref 0–1.9)
BILIRUB SERPL-MCNC: 1 MG/DL (ref 0.1–1)
BUN SERPL-MCNC: 13 MG/DL (ref 8–23)
CALCIUM SERPL-MCNC: 10 MG/DL (ref 8.7–10.5)
CHLORIDE SERPL-SCNC: 103 MMOL/L (ref 95–110)
CHOLEST SERPL-MCNC: 172 MG/DL (ref 120–199)
CHOLEST/HDLC SERPL: 3.1 {RATIO} (ref 2–5)
CO2 SERPL-SCNC: 26 MMOL/L (ref 23–29)
CREAT SERPL-MCNC: 0.6 MG/DL (ref 0.5–1.4)
DIFFERENTIAL METHOD: NORMAL
EOSINOPHIL # BLD AUTO: 0.1 K/UL (ref 0–0.5)
EOSINOPHIL NFR BLD: 1.9 % (ref 0–8)
ERYTHROCYTE [DISTWIDTH] IN BLOOD BY AUTOMATED COUNT: 13.4 % (ref 11.5–14.5)
EST. GFR  (AFRICAN AMERICAN): >60 ML/MIN/1.73 M^2
EST. GFR  (NON AFRICAN AMERICAN): >60 ML/MIN/1.73 M^2
GLUCOSE SERPL-MCNC: 101 MG/DL (ref 70–110)
HCT VFR BLD AUTO: 40.6 % (ref 37–48.5)
HDLC SERPL-MCNC: 56 MG/DL (ref 40–75)
HDLC SERPL: 32.6 % (ref 20–50)
HGB BLD-MCNC: 14 G/DL (ref 12–16)
IMM GRANULOCYTES # BLD AUTO: 0.01 K/UL (ref 0–0.04)
IMM GRANULOCYTES NFR BLD AUTO: 0.2 % (ref 0–0.5)
LDLC SERPL CALC-MCNC: 101 MG/DL (ref 63–159)
LYMPHOCYTES # BLD AUTO: 1.7 K/UL (ref 1–4.8)
LYMPHOCYTES NFR BLD: 30.8 % (ref 18–48)
MCH RBC QN AUTO: 30.8 PG (ref 27–31)
MCHC RBC AUTO-ENTMCNC: 34.5 G/DL (ref 32–36)
MCV RBC AUTO: 89 FL (ref 82–98)
MONOCYTES # BLD AUTO: 0.4 K/UL (ref 0.3–1)
MONOCYTES NFR BLD: 7.6 % (ref 4–15)
NEUTROPHILS # BLD AUTO: 3.2 K/UL (ref 1.8–7.7)
NEUTROPHILS NFR BLD: 58.9 % (ref 38–73)
NONHDLC SERPL-MCNC: 116 MG/DL
NRBC BLD-RTO: 0 /100 WBC
PLATELET # BLD AUTO: 238 K/UL (ref 150–450)
PMV BLD AUTO: 10.9 FL (ref 9.2–12.9)
POTASSIUM SERPL-SCNC: 3.8 MMOL/L (ref 3.5–5.1)
PROT SERPL-MCNC: 7.1 G/DL (ref 6–8.4)
RBC # BLD AUTO: 4.54 M/UL (ref 4–5.4)
SODIUM SERPL-SCNC: 142 MMOL/L (ref 136–145)
TRIGL SERPL-MCNC: 75 MG/DL (ref 30–150)
TSH SERPL DL<=0.005 MIU/L-ACNC: 2.28 UIU/ML (ref 0.4–4)
WBC # BLD AUTO: 5.36 K/UL (ref 3.9–12.7)

## 2021-09-29 PROCEDURE — 3044F HG A1C LEVEL LT 7.0%: CPT | Mod: CPTII,S$GLB,, | Performed by: FAMILY MEDICINE

## 2021-09-29 PROCEDURE — 3078F DIAST BP <80 MM HG: CPT | Mod: CPTII,S$GLB,, | Performed by: FAMILY MEDICINE

## 2021-09-29 PROCEDURE — 1159F PR MEDICATION LIST DOCUMENTED IN MEDICAL RECORD: ICD-10-PCS | Mod: CPTII,S$GLB,, | Performed by: FAMILY MEDICINE

## 2021-09-29 PROCEDURE — 36415 COLL VENOUS BLD VENIPUNCTURE: CPT | Mod: PO | Performed by: FAMILY MEDICINE

## 2021-09-29 PROCEDURE — 99999 PR PBB SHADOW E&M-EST. PATIENT-LVL III: CPT | Mod: PBBFAC,,, | Performed by: FAMILY MEDICINE

## 2021-09-29 PROCEDURE — 3008F BODY MASS INDEX DOCD: CPT | Mod: CPTII,S$GLB,, | Performed by: FAMILY MEDICINE

## 2021-09-29 PROCEDURE — 85025 COMPLETE CBC W/AUTO DIFF WBC: CPT | Performed by: FAMILY MEDICINE

## 2021-09-29 PROCEDURE — 3074F PR MOST RECENT SYSTOLIC BLOOD PRESSURE < 130 MM HG: ICD-10-PCS | Mod: CPTII,S$GLB,, | Performed by: FAMILY MEDICINE

## 2021-09-29 PROCEDURE — 80061 LIPID PANEL: CPT | Performed by: FAMILY MEDICINE

## 2021-09-29 PROCEDURE — 1101F PT FALLS ASSESS-DOCD LE1/YR: CPT | Mod: CPTII,S$GLB,, | Performed by: FAMILY MEDICINE

## 2021-09-29 PROCEDURE — 1126F AMNT PAIN NOTED NONE PRSNT: CPT | Mod: CPTII,S$GLB,, | Performed by: FAMILY MEDICINE

## 2021-09-29 PROCEDURE — 84443 ASSAY THYROID STIM HORMONE: CPT | Performed by: FAMILY MEDICINE

## 2021-09-29 PROCEDURE — 3078F PR MOST RECENT DIASTOLIC BLOOD PRESSURE < 80 MM HG: ICD-10-PCS | Mod: CPTII,S$GLB,, | Performed by: FAMILY MEDICINE

## 2021-09-29 PROCEDURE — 3074F SYST BP LT 130 MM HG: CPT | Mod: CPTII,S$GLB,, | Performed by: FAMILY MEDICINE

## 2021-09-29 PROCEDURE — 3008F PR BODY MASS INDEX (BMI) DOCUMENTED: ICD-10-PCS | Mod: CPTII,S$GLB,, | Performed by: FAMILY MEDICINE

## 2021-09-29 PROCEDURE — 80053 COMPREHEN METABOLIC PANEL: CPT | Performed by: FAMILY MEDICINE

## 2021-09-29 PROCEDURE — 99214 OFFICE O/P EST MOD 30 MIN: CPT | Mod: S$GLB,,, | Performed by: FAMILY MEDICINE

## 2021-09-29 PROCEDURE — 1160F RVW MEDS BY RX/DR IN RCRD: CPT | Mod: CPTII,S$GLB,, | Performed by: FAMILY MEDICINE

## 2021-09-29 PROCEDURE — 1126F PR PAIN SEVERITY QUANTIFIED, NO PAIN PRESENT: ICD-10-PCS | Mod: CPTII,S$GLB,, | Performed by: FAMILY MEDICINE

## 2021-09-29 PROCEDURE — 4010F PR ACE/ARB THEARPY RXD/TAKEN: ICD-10-PCS | Mod: CPTII,S$GLB,, | Performed by: FAMILY MEDICINE

## 2021-09-29 PROCEDURE — 1101F PR PT FALLS ASSESS DOC 0-1 FALLS W/OUT INJ PAST YR: ICD-10-PCS | Mod: CPTII,S$GLB,, | Performed by: FAMILY MEDICINE

## 2021-09-29 PROCEDURE — 1159F MED LIST DOCD IN RCRD: CPT | Mod: CPTII,S$GLB,, | Performed by: FAMILY MEDICINE

## 2021-09-29 PROCEDURE — 4010F ACE/ARB THERAPY RXD/TAKEN: CPT | Mod: CPTII,S$GLB,, | Performed by: FAMILY MEDICINE

## 2021-09-29 PROCEDURE — 3044F PR MOST RECENT HEMOGLOBIN A1C LEVEL <7.0%: ICD-10-PCS | Mod: CPTII,S$GLB,, | Performed by: FAMILY MEDICINE

## 2021-09-29 PROCEDURE — 99214 PR OFFICE/OUTPT VISIT, EST, LEVL IV, 30-39 MIN: ICD-10-PCS | Mod: S$GLB,,, | Performed by: FAMILY MEDICINE

## 2021-09-29 PROCEDURE — 1160F PR REVIEW ALL MEDS BY PRESCRIBER/CLIN PHARMACIST DOCUMENTED: ICD-10-PCS | Mod: CPTII,S$GLB,, | Performed by: FAMILY MEDICINE

## 2021-09-29 PROCEDURE — 3288F PR FALLS RISK ASSESSMENT DOCUMENTED: ICD-10-PCS | Mod: CPTII,S$GLB,, | Performed by: FAMILY MEDICINE

## 2021-09-29 PROCEDURE — 99999 PR PBB SHADOW E&M-EST. PATIENT-LVL III: ICD-10-PCS | Mod: PBBFAC,,, | Performed by: FAMILY MEDICINE

## 2021-09-29 PROCEDURE — 3288F FALL RISK ASSESSMENT DOCD: CPT | Mod: CPTII,S$GLB,, | Performed by: FAMILY MEDICINE

## 2021-10-04 ENCOUNTER — PATIENT MESSAGE (OUTPATIENT)
Dept: FAMILY MEDICINE | Facility: CLINIC | Age: 71
End: 2021-10-04

## 2021-10-05 ENCOUNTER — TELEPHONE (OUTPATIENT)
Dept: FAMILY MEDICINE | Facility: CLINIC | Age: 71
End: 2021-10-05
Payer: MEDICARE

## 2021-10-05 DIAGNOSIS — R82.90 URINE ABNORMALITY: Primary | ICD-10-CM

## 2021-10-06 ENCOUNTER — LAB VISIT (OUTPATIENT)
Dept: LAB | Facility: HOSPITAL | Age: 71
End: 2021-10-06
Attending: FAMILY MEDICINE
Payer: MEDICARE

## 2021-10-06 DIAGNOSIS — R82.90 URINE ABNORMALITY: ICD-10-CM

## 2021-10-06 LAB
BILIRUB UR QL STRIP: NEGATIVE
CLARITY UR REFRACT.AUTO: CLEAR
COLOR UR AUTO: ABNORMAL
GLUCOSE UR QL STRIP: NEGATIVE
HGB UR QL STRIP: ABNORMAL
KETONES UR QL STRIP: NEGATIVE
LEUKOCYTE ESTERASE UR QL STRIP: NEGATIVE
MICROSCOPIC COMMENT: NORMAL
NITRITE UR QL STRIP: NEGATIVE
PH UR STRIP: 6 [PH] (ref 5–8)
PROT UR QL STRIP: NEGATIVE
RBC #/AREA URNS AUTO: 0 /HPF (ref 0–4)
SP GR UR STRIP: 1.01 (ref 1–1.03)
SQUAMOUS #/AREA URNS AUTO: 0 /HPF
URN SPEC COLLECT METH UR: ABNORMAL
WBC #/AREA URNS AUTO: 1 /HPF (ref 0–5)

## 2021-10-06 PROCEDURE — 87086 URINE CULTURE/COLONY COUNT: CPT | Performed by: FAMILY MEDICINE

## 2021-10-06 PROCEDURE — 81001 URINALYSIS AUTO W/SCOPE: CPT | Performed by: FAMILY MEDICINE

## 2021-10-07 ENCOUNTER — PATIENT MESSAGE (OUTPATIENT)
Dept: FAMILY MEDICINE | Facility: CLINIC | Age: 71
End: 2021-10-07

## 2021-10-07 LAB
BACTERIA UR CULT: NORMAL
BACTERIA UR CULT: NORMAL

## 2021-10-09 ENCOUNTER — TELEPHONE (OUTPATIENT)
Dept: FAMILY MEDICINE | Facility: CLINIC | Age: 71
End: 2021-10-09
Payer: MEDICARE

## 2021-10-09 DIAGNOSIS — R31.9 HEMATURIA, UNSPECIFIED TYPE: Primary | ICD-10-CM

## 2021-10-11 ENCOUNTER — TELEPHONE (OUTPATIENT)
Dept: ADMINISTRATIVE | Facility: OTHER | Age: 71
End: 2021-10-11

## 2021-10-14 ENCOUNTER — TELEPHONE (OUTPATIENT)
Dept: FAMILY MEDICINE | Facility: CLINIC | Age: 71
End: 2021-10-14
Payer: MEDICARE

## 2021-12-06 ENCOUNTER — TELEPHONE (OUTPATIENT)
Dept: FAMILY MEDICINE | Facility: CLINIC | Age: 71
End: 2021-12-06
Payer: MEDICARE

## 2021-12-07 ENCOUNTER — TELEPHONE (OUTPATIENT)
Dept: FAMILY MEDICINE | Facility: CLINIC | Age: 71
End: 2021-12-07
Payer: MEDICARE

## 2021-12-08 ENCOUNTER — CLINICAL SUPPORT (OUTPATIENT)
Dept: FAMILY MEDICINE | Facility: CLINIC | Age: 71
End: 2021-12-08
Payer: MEDICARE

## 2021-12-08 DIAGNOSIS — Z23 NEED FOR INFLUENZA VACCINATION: Primary | ICD-10-CM

## 2021-12-08 PROCEDURE — G0008 FLU VACCINE - QUADRIVALENT - ADJUVANTED: ICD-10-PCS | Mod: S$GLB,,, | Performed by: FAMILY MEDICINE

## 2021-12-08 PROCEDURE — 90694 VACC AIIV4 NO PRSRV 0.5ML IM: CPT | Mod: S$GLB,,, | Performed by: FAMILY MEDICINE

## 2021-12-08 PROCEDURE — 90694 FLU VACCINE - QUADRIVALENT - ADJUVANTED: ICD-10-PCS | Mod: S$GLB,,, | Performed by: FAMILY MEDICINE

## 2021-12-08 PROCEDURE — G0008 ADMIN INFLUENZA VIRUS VAC: HCPCS | Mod: S$GLB,,, | Performed by: FAMILY MEDICINE

## 2021-12-23 ENCOUNTER — PES CALL (OUTPATIENT)
Dept: ADMINISTRATIVE | Facility: CLINIC | Age: 71
End: 2021-12-23
Payer: MEDICARE

## 2021-12-25 ENCOUNTER — PATIENT MESSAGE (OUTPATIENT)
Dept: FAMILY MEDICINE | Facility: CLINIC | Age: 71
End: 2021-12-25
Payer: MEDICARE

## 2021-12-26 ENCOUNTER — OFFICE VISIT (OUTPATIENT)
Dept: URGENT CARE | Facility: CLINIC | Age: 71
End: 2021-12-26
Payer: MEDICARE

## 2021-12-26 VITALS
HEART RATE: 82 BPM | HEIGHT: 62 IN | WEIGHT: 125 LBS | DIASTOLIC BLOOD PRESSURE: 78 MMHG | BODY MASS INDEX: 23 KG/M2 | TEMPERATURE: 99 F | RESPIRATION RATE: 18 BRPM | SYSTOLIC BLOOD PRESSURE: 147 MMHG | OXYGEN SATURATION: 97 %

## 2021-12-26 DIAGNOSIS — Z20.822 ENCOUNTER FOR LABORATORY TESTING FOR COVID-19 VIRUS: Primary | ICD-10-CM

## 2021-12-26 DIAGNOSIS — U07.1 COVID-19 VIRUS INFECTION: ICD-10-CM

## 2021-12-26 DIAGNOSIS — R52 BODY ACHES: ICD-10-CM

## 2021-12-26 DIAGNOSIS — U07.1 COVID-19 VIRUS DETECTED: ICD-10-CM

## 2021-12-26 DIAGNOSIS — R68.83 CHILLS: ICD-10-CM

## 2021-12-26 LAB
CTP QC/QA: YES
SARS-COV-2 RDRP RESP QL NAA+PROBE: POSITIVE

## 2021-12-26 PROCEDURE — 3008F PR BODY MASS INDEX (BMI) DOCUMENTED: ICD-10-PCS | Mod: CPTII,S$GLB,, | Performed by: INTERNAL MEDICINE

## 2021-12-26 PROCEDURE — 99214 OFFICE O/P EST MOD 30 MIN: CPT | Mod: S$GLB,CS,, | Performed by: INTERNAL MEDICINE

## 2021-12-26 PROCEDURE — 3078F PR MOST RECENT DIASTOLIC BLOOD PRESSURE < 80 MM HG: ICD-10-PCS | Mod: CPTII,S$GLB,, | Performed by: INTERNAL MEDICINE

## 2021-12-26 PROCEDURE — 1160F PR REVIEW ALL MEDS BY PRESCRIBER/CLIN PHARMACIST DOCUMENTED: ICD-10-PCS | Mod: CPTII,S$GLB,, | Performed by: INTERNAL MEDICINE

## 2021-12-26 PROCEDURE — U0002 COVID-19 LAB TEST NON-CDC: HCPCS | Mod: QW,CR,S$GLB, | Performed by: INTERNAL MEDICINE

## 2021-12-26 PROCEDURE — 99214 PR OFFICE/OUTPT VISIT, EST, LEVL IV, 30-39 MIN: ICD-10-PCS | Mod: S$GLB,CS,, | Performed by: INTERNAL MEDICINE

## 2021-12-26 PROCEDURE — 1160F RVW MEDS BY RX/DR IN RCRD: CPT | Mod: CPTII,S$GLB,, | Performed by: INTERNAL MEDICINE

## 2021-12-26 PROCEDURE — 4010F ACE/ARB THERAPY RXD/TAKEN: CPT | Mod: CPTII,S$GLB,, | Performed by: INTERNAL MEDICINE

## 2021-12-26 PROCEDURE — 1159F MED LIST DOCD IN RCRD: CPT | Mod: CPTII,S$GLB,, | Performed by: INTERNAL MEDICINE

## 2021-12-26 PROCEDURE — 3077F SYST BP >= 140 MM HG: CPT | Mod: CPTII,S$GLB,, | Performed by: INTERNAL MEDICINE

## 2021-12-26 PROCEDURE — 3044F PR MOST RECENT HEMOGLOBIN A1C LEVEL <7.0%: ICD-10-PCS | Mod: CPTII,S$GLB,, | Performed by: INTERNAL MEDICINE

## 2021-12-26 PROCEDURE — 3077F PR MOST RECENT SYSTOLIC BLOOD PRESSURE >= 140 MM HG: ICD-10-PCS | Mod: CPTII,S$GLB,, | Performed by: INTERNAL MEDICINE

## 2021-12-26 PROCEDURE — U0002: ICD-10-PCS | Mod: QW,CR,S$GLB, | Performed by: INTERNAL MEDICINE

## 2021-12-26 PROCEDURE — 4010F PR ACE/ARB THEARPY RXD/TAKEN: ICD-10-PCS | Mod: CPTII,S$GLB,, | Performed by: INTERNAL MEDICINE

## 2021-12-26 PROCEDURE — 1159F PR MEDICATION LIST DOCUMENTED IN MEDICAL RECORD: ICD-10-PCS | Mod: CPTII,S$GLB,, | Performed by: INTERNAL MEDICINE

## 2021-12-26 PROCEDURE — 3044F HG A1C LEVEL LT 7.0%: CPT | Mod: CPTII,S$GLB,, | Performed by: INTERNAL MEDICINE

## 2021-12-26 PROCEDURE — 3078F DIAST BP <80 MM HG: CPT | Mod: CPTII,S$GLB,, | Performed by: INTERNAL MEDICINE

## 2021-12-26 PROCEDURE — 3008F BODY MASS INDEX DOCD: CPT | Mod: CPTII,S$GLB,, | Performed by: INTERNAL MEDICINE

## 2022-01-02 ENCOUNTER — PATIENT MESSAGE (OUTPATIENT)
Dept: ADMINISTRATIVE | Facility: HOSPITAL | Age: 72
End: 2022-01-02
Payer: MEDICARE

## 2022-02-22 ENCOUNTER — PATIENT MESSAGE (OUTPATIENT)
Dept: RESEARCH | Facility: HOSPITAL | Age: 72
End: 2022-02-22
Payer: MEDICARE

## 2022-02-25 ENCOUNTER — OFFICE VISIT (OUTPATIENT)
Dept: INTERNAL MEDICINE | Facility: CLINIC | Age: 72
End: 2022-02-25
Payer: MEDICARE

## 2022-02-25 VITALS
OXYGEN SATURATION: 100 % | DIASTOLIC BLOOD PRESSURE: 74 MMHG | HEIGHT: 62 IN | HEART RATE: 75 BPM | RESPIRATION RATE: 16 BRPM | SYSTOLIC BLOOD PRESSURE: 130 MMHG | WEIGHT: 129.44 LBS | TEMPERATURE: 98 F | BODY MASS INDEX: 23.82 KG/M2

## 2022-02-25 DIAGNOSIS — H61.23 BILATERAL IMPACTED CERUMEN: ICD-10-CM

## 2022-02-25 DIAGNOSIS — R82.90 ABNORMAL URINE: ICD-10-CM

## 2022-02-25 DIAGNOSIS — R42 VERTIGO: Primary | ICD-10-CM

## 2022-02-25 PROCEDURE — 3288F FALL RISK ASSESSMENT DOCD: CPT | Mod: CPTII,S$GLB,, | Performed by: INTERNAL MEDICINE

## 2022-02-25 PROCEDURE — 3078F PR MOST RECENT DIASTOLIC BLOOD PRESSURE < 80 MM HG: ICD-10-PCS | Mod: CPTII,S$GLB,, | Performed by: INTERNAL MEDICINE

## 2022-02-25 PROCEDURE — 3008F PR BODY MASS INDEX (BMI) DOCUMENTED: ICD-10-PCS | Mod: CPTII,S$GLB,, | Performed by: INTERNAL MEDICINE

## 2022-02-25 PROCEDURE — 3078F DIAST BP <80 MM HG: CPT | Mod: CPTII,S$GLB,, | Performed by: INTERNAL MEDICINE

## 2022-02-25 PROCEDURE — 99999 PR PBB SHADOW E&M-EST. PATIENT-LVL IV: ICD-10-PCS | Mod: PBBFAC,,, | Performed by: INTERNAL MEDICINE

## 2022-02-25 PROCEDURE — 3075F PR MOST RECENT SYSTOLIC BLOOD PRESS GE 130-139MM HG: ICD-10-PCS | Mod: CPTII,S$GLB,, | Performed by: INTERNAL MEDICINE

## 2022-02-25 PROCEDURE — 1101F PR PT FALLS ASSESS DOC 0-1 FALLS W/OUT INJ PAST YR: ICD-10-PCS | Mod: CPTII,S$GLB,, | Performed by: INTERNAL MEDICINE

## 2022-02-25 PROCEDURE — 3288F PR FALLS RISK ASSESSMENT DOCUMENTED: ICD-10-PCS | Mod: CPTII,S$GLB,, | Performed by: INTERNAL MEDICINE

## 2022-02-25 PROCEDURE — 1101F PT FALLS ASSESS-DOCD LE1/YR: CPT | Mod: CPTII,S$GLB,, | Performed by: INTERNAL MEDICINE

## 2022-02-25 PROCEDURE — 1126F PR PAIN SEVERITY QUANTIFIED, NO PAIN PRESENT: ICD-10-PCS | Mod: CPTII,S$GLB,, | Performed by: INTERNAL MEDICINE

## 2022-02-25 PROCEDURE — 99213 PR OFFICE/OUTPT VISIT, EST, LEVL III, 20-29 MIN: ICD-10-PCS | Mod: S$GLB,,, | Performed by: INTERNAL MEDICINE

## 2022-02-25 PROCEDURE — 99999 PR PBB SHADOW E&M-EST. PATIENT-LVL IV: CPT | Mod: PBBFAC,,, | Performed by: INTERNAL MEDICINE

## 2022-02-25 PROCEDURE — 3075F SYST BP GE 130 - 139MM HG: CPT | Mod: CPTII,S$GLB,, | Performed by: INTERNAL MEDICINE

## 2022-02-25 PROCEDURE — 99213 OFFICE O/P EST LOW 20 MIN: CPT | Mod: S$GLB,,, | Performed by: INTERNAL MEDICINE

## 2022-02-25 PROCEDURE — 3008F BODY MASS INDEX DOCD: CPT | Mod: CPTII,S$GLB,, | Performed by: INTERNAL MEDICINE

## 2022-02-25 PROCEDURE — 4010F PR ACE/ARB THEARPY RXD/TAKEN: ICD-10-PCS | Mod: CPTII,S$GLB,, | Performed by: INTERNAL MEDICINE

## 2022-02-25 PROCEDURE — 1126F AMNT PAIN NOTED NONE PRSNT: CPT | Mod: CPTII,S$GLB,, | Performed by: INTERNAL MEDICINE

## 2022-02-25 PROCEDURE — 4010F ACE/ARB THERAPY RXD/TAKEN: CPT | Mod: CPTII,S$GLB,, | Performed by: INTERNAL MEDICINE

## 2022-02-25 RX ORDER — MECLIZINE HCL 12.5 MG 12.5 MG/1
12.5 TABLET ORAL 3 TIMES DAILY PRN
Qty: 30 TABLET | Refills: 0 | Status: SHIPPED | OUTPATIENT
Start: 2022-02-25 | End: 2022-03-29 | Stop reason: SDUPTHER

## 2022-02-25 NOTE — PROGRESS NOTES
History of present illness:  [age] year lady with hypertension is in today with family.  She gives 2-3 week history of recurring episodes of feeling of dizziness describing it is a spinning sensation.  Only last for few seconds.  Not associated with any other symptomatologies such as headache, ear pain, tinnitus, nausea vomiting.  Occurs several times per day.  Denies any focal neurological symptomatologies.  No recent fever chills or body aches.  No URI symptoms.  No hearing changes.    Current medications:  All medications are noted and reviewed.    Review of systems:  Constitutional:  No fever chills.  HEENT:  See HPI.  Respiratory:  No cough shortness of breath.  Cardiovascular:  No chest pain or palpitations and no syncope.  No presyncope no edema.  GI:  No nausea no vomiting no changes in bowel habits.        Physical examination:  Pleasant alert appropriately groomed lady no acute distress.  Vital signs:  All noted and reviewed is normal.  No orthostatic changes.  Eyes:  Sclerae white and nonicteric.  HEENT:  Normocephalic.  Neck supple no masses no thyromegaly.  Excess cerumen in both ear canals.  No mastoid tenderness.  No facial asymmetry or tenderness.  Cardiovascular:  Regular rate rhythm.  No significant murmur.  Carotids full bilaterally bruits.  Lungs:  Clear to auscultation.  Neurologic:  No gross focal motor deficits.  Gait normal.  Negative Romberg.  Heel-toe gait normal.  Mental status:  Alert oriented affect mood all appropriate.      Impression:  This lady appears to be having episodes of very brief vertigo most likely benign in nature.  She does have cerumen impactions bilaterally.    Plan:  Discussed and reassured and advised we expect gradual resolution.  She is given a prescription for meclizine 12.5 mg to use b.i.d. to t.i.d. as needed advise if symptoms do not gradually resolve.  Referral to ENT regarding cerumen impactions.

## 2022-02-28 ENCOUNTER — PATIENT MESSAGE (OUTPATIENT)
Dept: INTERNAL MEDICINE | Facility: CLINIC | Age: 72
End: 2022-02-28
Payer: MEDICARE

## 2022-03-12 DIAGNOSIS — E78.5 HYPERLIPIDEMIA, UNSPECIFIED HYPERLIPIDEMIA TYPE: ICD-10-CM

## 2022-03-12 DIAGNOSIS — E78.5 DYSLIPIDEMIA: ICD-10-CM

## 2022-03-12 NOTE — TELEPHONE ENCOUNTER
No new care gaps identified.  Powered by [employer]. Reference number: [id number].   3/12/2022 5:28:31 AM CST

## 2022-03-21 RX ORDER — ATORVASTATIN CALCIUM 10 MG/1
10 TABLET, FILM COATED ORAL DAILY
Qty: 90 TABLET | Refills: 1 | Status: SHIPPED | OUTPATIENT
Start: 2022-03-21 | End: 2022-06-27

## 2022-03-21 NOTE — TELEPHONE ENCOUNTER
Refill Authorization Note   Nasima Pacheco  is requesting a refill authorization.  Brief Assessment and Rationale for Refill:  Approve     Medication Therapy Plan:       Medication Reconciliation Completed: No   Comments:   --->Care Gap information included below if applicable.   Orders Placed This Encounter    atorvastatin (LIPITOR) 10 MG tablet      Requested Prescriptions   Signed Prescriptions Disp Refills    atorvastatin (LIPITOR) 10 MG tablet 90 tablet 1     Sig: Take 1 tablet (10 mg total) by mouth once daily.       Cardiovascular:  Antilipid - Statins Passed - 3/21/2022 10:59 AM        Passed - Patient is at least 18 years old        Passed - Valid encounter within last 15 months     Recent Visits  Date Type Provider Dept   09/29/21 Office Visit Jose Fang MD [facility]   03/26/21 Office Visit Jose Fang MD [facility]   10/05/20 Office Visit Jose Fang MD [facility]   Showing recent visits within past 720 days and meeting all other requirements  Future Appointments  No visits were found meeting these conditions.  Showing future appointments within next 150 days and meeting all other requirements      Future Appointments              In 1 week Jose Fang MD [facility], [place]    In 2 weeks [facility]    In 1 month Jose Fang MD [facility], [place]    In 1 month MD Facundo Frausto - [address], [place]                Passed - ALT is 131 or below and within 360 days     ALT   Date Value Ref Range Status   09/29/2021 15 10 - 44 U/L Final   08/06/2021 12 10 - 44 U/L Final   03/26/2021 14 10 - 44 U/L Final              Passed - AST is 119 or below and within 360 days     AST   Date Value Ref Range Status   09/29/2021 18 10 - 40 U/L Final   08/06/2021 15 10 - 40 U/L Final   03/26/2021 16 10 - 40 U/L Final               Passed - Total Cholesterol within 360 days     Lab Results   Component Value Date    CHOL 172 09/29/2021    CHOL 176 03/26/2021    CHOL 145 10/08/2020              Passed - LDL within 360 days     LDL Cholesterol   Date Value Ref Range Status   09/29/2021 101.0 63.0 - 159.0 mg/dL Final     Comment:     The National Cholesterol Education Program (NCEP) has set the  following guidelines (reference values) for LDL Cholesterol:  Optimal.......................<130 mg/dL  Borderline High...............130-159 mg/dL  High..........................160-189 mg/dL  Very High.....................>190 mg/dL              Passed - HDL within 360 days     HDL   Date Value Ref Range Status   09/29/2021 56 40 - 75 mg/dL Final     Comment:     The National Cholesterol Education Program (NCEP) has set the  following guidelines (reference values) for HDL Cholesterol:  Low...............<40 mg/dL  Optimal...........>60 mg/dL              Passed - Triglycerides within 360 days     Lab Results   Component Value Date    TRIG 75 09/29/2021    TRIG 98 03/26/2021    TRIG 82 10/08/2020                  Appointments  past 12m or future 3m with PCP    Date Provider   Last Visit   9/29/2021 Jose Fang MD   Next Visit   3/29/2022 Jose Fang MD   ED visits in past 90 days: 0     Note composed:1:11 PM 03/21/2022

## 2022-03-29 ENCOUNTER — OFFICE VISIT (OUTPATIENT)
Dept: FAMILY MEDICINE | Facility: CLINIC | Age: 72
End: 2022-03-29
Payer: MEDICARE

## 2022-03-29 ENCOUNTER — LAB VISIT (OUTPATIENT)
Dept: LAB | Facility: HOSPITAL | Age: 72
End: 2022-03-29
Attending: FAMILY MEDICINE
Payer: MEDICARE

## 2022-03-29 VITALS
HEIGHT: 62 IN | DIASTOLIC BLOOD PRESSURE: 72 MMHG | WEIGHT: 130.31 LBS | SYSTOLIC BLOOD PRESSURE: 118 MMHG | HEART RATE: 76 BPM | OXYGEN SATURATION: 98 % | BODY MASS INDEX: 23.98 KG/M2

## 2022-03-29 DIAGNOSIS — R73.03 PREDIABETES: ICD-10-CM

## 2022-03-29 DIAGNOSIS — M81.0 SENILE OSTEOPOROSIS: ICD-10-CM

## 2022-03-29 DIAGNOSIS — H81.13 BENIGN PAROXYSMAL POSITIONAL VERTIGO DUE TO BILATERAL VESTIBULAR DISORDER: ICD-10-CM

## 2022-03-29 DIAGNOSIS — I10 ESSENTIAL HYPERTENSION: ICD-10-CM

## 2022-03-29 DIAGNOSIS — I10 ESSENTIAL HYPERTENSION: Primary | ICD-10-CM

## 2022-03-29 DIAGNOSIS — R07.89 ATYPICAL CHEST PAIN: ICD-10-CM

## 2022-03-29 LAB
ALBUMIN SERPL BCP-MCNC: 4.5 G/DL (ref 3.5–5.2)
ALP SERPL-CCNC: 115 U/L (ref 55–135)
ALT SERPL W/O P-5'-P-CCNC: 12 U/L (ref 10–44)
ANION GAP SERPL CALC-SCNC: 12 MMOL/L (ref 8–16)
AST SERPL-CCNC: 17 U/L (ref 10–40)
BASOPHILS # BLD AUTO: 0.04 K/UL (ref 0–0.2)
BASOPHILS NFR BLD: 0.8 % (ref 0–1.9)
BILIRUB SERPL-MCNC: 1.1 MG/DL (ref 0.1–1)
BUN SERPL-MCNC: 14 MG/DL (ref 8–23)
CALCIUM SERPL-MCNC: 10.3 MG/DL (ref 8.7–10.5)
CHLORIDE SERPL-SCNC: 104 MMOL/L (ref 95–110)
CHOLEST SERPL-MCNC: 225 MG/DL (ref 120–199)
CHOLEST/HDLC SERPL: 3.9 {RATIO} (ref 2–5)
CO2 SERPL-SCNC: 27 MMOL/L (ref 23–29)
CREAT SERPL-MCNC: 0.7 MG/DL (ref 0.5–1.4)
DIFFERENTIAL METHOD: NORMAL
EOSINOPHIL # BLD AUTO: 0.2 K/UL (ref 0–0.5)
EOSINOPHIL NFR BLD: 3.2 % (ref 0–8)
ERYTHROCYTE [DISTWIDTH] IN BLOOD BY AUTOMATED COUNT: 13.2 % (ref 11.5–14.5)
EST. GFR  (AFRICAN AMERICAN): >60 ML/MIN/1.73 M^2
EST. GFR  (NON AFRICAN AMERICAN): >60 ML/MIN/1.73 M^2
GLUCOSE SERPL-MCNC: 92 MG/DL (ref 70–110)
HCT VFR BLD AUTO: 42.4 % (ref 37–48.5)
HDLC SERPL-MCNC: 57 MG/DL (ref 40–75)
HDLC SERPL: 25.3 % (ref 20–50)
HGB BLD-MCNC: 14.3 G/DL (ref 12–16)
IMM GRANULOCYTES # BLD AUTO: 0.01 K/UL (ref 0–0.04)
IMM GRANULOCYTES NFR BLD AUTO: 0.2 % (ref 0–0.5)
LDLC SERPL CALC-MCNC: 143.8 MG/DL (ref 63–159)
LYMPHOCYTES # BLD AUTO: 1.8 K/UL (ref 1–4.8)
LYMPHOCYTES NFR BLD: 38.5 % (ref 18–48)
MCH RBC QN AUTO: 31 PG (ref 27–31)
MCHC RBC AUTO-ENTMCNC: 33.7 G/DL (ref 32–36)
MCV RBC AUTO: 92 FL (ref 82–98)
MONOCYTES # BLD AUTO: 0.4 K/UL (ref 0.3–1)
MONOCYTES NFR BLD: 9.3 % (ref 4–15)
NEUTROPHILS # BLD AUTO: 2.3 K/UL (ref 1.8–7.7)
NEUTROPHILS NFR BLD: 48 % (ref 38–73)
NONHDLC SERPL-MCNC: 168 MG/DL
NRBC BLD-RTO: 0 /100 WBC
PLATELET # BLD AUTO: 220 K/UL (ref 150–450)
PMV BLD AUTO: 11.3 FL (ref 9.2–12.9)
POTASSIUM SERPL-SCNC: 4.1 MMOL/L (ref 3.5–5.1)
PROT SERPL-MCNC: 7.1 G/DL (ref 6–8.4)
RBC # BLD AUTO: 4.61 M/UL (ref 4–5.4)
SODIUM SERPL-SCNC: 143 MMOL/L (ref 136–145)
TRIGL SERPL-MCNC: 121 MG/DL (ref 30–150)
WBC # BLD AUTO: 4.75 K/UL (ref 3.9–12.7)

## 2022-03-29 PROCEDURE — 1126F AMNT PAIN NOTED NONE PRSNT: CPT | Mod: CPTII,S$GLB,, | Performed by: FAMILY MEDICINE

## 2022-03-29 PROCEDURE — 3074F PR MOST RECENT SYSTOLIC BLOOD PRESSURE < 130 MM HG: ICD-10-PCS | Mod: CPTII,S$GLB,, | Performed by: FAMILY MEDICINE

## 2022-03-29 PROCEDURE — 99999 PR PBB SHADOW E&M-EST. PATIENT-LVL III: ICD-10-PCS | Mod: PBBFAC,,, | Performed by: FAMILY MEDICINE

## 2022-03-29 PROCEDURE — 3078F PR MOST RECENT DIASTOLIC BLOOD PRESSURE < 80 MM HG: ICD-10-PCS | Mod: CPTII,S$GLB,, | Performed by: FAMILY MEDICINE

## 2022-03-29 PROCEDURE — 36415 COLL VENOUS BLD VENIPUNCTURE: CPT | Mod: PO | Performed by: FAMILY MEDICINE

## 2022-03-29 PROCEDURE — 1101F PT FALLS ASSESS-DOCD LE1/YR: CPT | Mod: CPTII,S$GLB,, | Performed by: FAMILY MEDICINE

## 2022-03-29 PROCEDURE — 4010F ACE/ARB THERAPY RXD/TAKEN: CPT | Mod: CPTII,S$GLB,, | Performed by: FAMILY MEDICINE

## 2022-03-29 PROCEDURE — 83036 HEMOGLOBIN GLYCOSYLATED A1C: CPT | Performed by: FAMILY MEDICINE

## 2022-03-29 PROCEDURE — 85025 COMPLETE CBC W/AUTO DIFF WBC: CPT | Performed by: FAMILY MEDICINE

## 2022-03-29 PROCEDURE — 3078F DIAST BP <80 MM HG: CPT | Mod: CPTII,S$GLB,, | Performed by: FAMILY MEDICINE

## 2022-03-29 PROCEDURE — 3008F PR BODY MASS INDEX (BMI) DOCUMENTED: ICD-10-PCS | Mod: CPTII,S$GLB,, | Performed by: FAMILY MEDICINE

## 2022-03-29 PROCEDURE — 3288F FALL RISK ASSESSMENT DOCD: CPT | Mod: CPTII,S$GLB,, | Performed by: FAMILY MEDICINE

## 2022-03-29 PROCEDURE — 80053 COMPREHEN METABOLIC PANEL: CPT | Performed by: FAMILY MEDICINE

## 2022-03-29 PROCEDURE — 4010F PR ACE/ARB THEARPY RXD/TAKEN: ICD-10-PCS | Mod: CPTII,S$GLB,, | Performed by: FAMILY MEDICINE

## 2022-03-29 PROCEDURE — 1101F PR PT FALLS ASSESS DOC 0-1 FALLS W/OUT INJ PAST YR: ICD-10-PCS | Mod: CPTII,S$GLB,, | Performed by: FAMILY MEDICINE

## 2022-03-29 PROCEDURE — 3008F BODY MASS INDEX DOCD: CPT | Mod: CPTII,S$GLB,, | Performed by: FAMILY MEDICINE

## 2022-03-29 PROCEDURE — 99215 OFFICE O/P EST HI 40 MIN: CPT | Mod: S$GLB,,, | Performed by: FAMILY MEDICINE

## 2022-03-29 PROCEDURE — 1160F PR REVIEW ALL MEDS BY PRESCRIBER/CLIN PHARMACIST DOCUMENTED: ICD-10-PCS | Mod: CPTII,S$GLB,, | Performed by: FAMILY MEDICINE

## 2022-03-29 PROCEDURE — 80061 LIPID PANEL: CPT | Performed by: FAMILY MEDICINE

## 2022-03-29 PROCEDURE — 3288F PR FALLS RISK ASSESSMENT DOCUMENTED: ICD-10-PCS | Mod: CPTII,S$GLB,, | Performed by: FAMILY MEDICINE

## 2022-03-29 PROCEDURE — 99215 PR OFFICE/OUTPT VISIT, EST, LEVL V, 40-54 MIN: ICD-10-PCS | Mod: S$GLB,,, | Performed by: FAMILY MEDICINE

## 2022-03-29 PROCEDURE — 1160F RVW MEDS BY RX/DR IN RCRD: CPT | Mod: CPTII,S$GLB,, | Performed by: FAMILY MEDICINE

## 2022-03-29 PROCEDURE — 99999 PR PBB SHADOW E&M-EST. PATIENT-LVL III: CPT | Mod: PBBFAC,,, | Performed by: FAMILY MEDICINE

## 2022-03-29 PROCEDURE — 1159F MED LIST DOCD IN RCRD: CPT | Mod: CPTII,S$GLB,, | Performed by: FAMILY MEDICINE

## 2022-03-29 PROCEDURE — 1159F PR MEDICATION LIST DOCUMENTED IN MEDICAL RECORD: ICD-10-PCS | Mod: CPTII,S$GLB,, | Performed by: FAMILY MEDICINE

## 2022-03-29 PROCEDURE — 1126F PR PAIN SEVERITY QUANTIFIED, NO PAIN PRESENT: ICD-10-PCS | Mod: CPTII,S$GLB,, | Performed by: FAMILY MEDICINE

## 2022-03-29 PROCEDURE — 3074F SYST BP LT 130 MM HG: CPT | Mod: CPTII,S$GLB,, | Performed by: FAMILY MEDICINE

## 2022-03-29 RX ORDER — HYDROCHLOROTHIAZIDE 12.5 MG/1
12.5 TABLET ORAL DAILY
Qty: 90 TABLET | Refills: 3 | Status: SHIPPED | OUTPATIENT
Start: 2022-03-29 | End: 2022-09-13 | Stop reason: SDUPTHER

## 2022-03-29 RX ORDER — MECLIZINE HCL 12.5 MG 12.5 MG/1
12.5 TABLET ORAL 3 TIMES DAILY PRN
Qty: 90 TABLET | Refills: 0 | Status: SHIPPED | OUTPATIENT
Start: 2022-03-29 | End: 2022-09-13 | Stop reason: SDUPTHER

## 2022-03-29 NOTE — PROGRESS NOTES
Subjective:       Patient ID: Nasima Pacheco is a 71 y.o. female.    Chief Complaint: Follow-up and Hypertension    73 years old female came to the clinic for blood pressure check.  Blood pressure today stable.  No  palpitation, orthopnea or PND.  Patient reports episodic chest pain.  She is currently asymptomatic.  No recent follow-up with cardiologist.  Patient prefers to continue only with calcium and vitamin-D supplementation For osteoporosis.  Patient with episodic dizziness associated with positional vertigo.  She was previously diagnosed with prediabetes.  No polyuria, polydipsia or polyphagia.    Review of Systems   Constitutional: Negative.    HENT: Negative.    Eyes: Negative.    Respiratory: Negative.    Cardiovascular: Positive for chest pain. Negative for palpitations, leg swelling and claudication.   Gastrointestinal: Negative.    Endocrine: Negative for polydipsia, polyphagia and polyuria.   Genitourinary: Negative.    Musculoskeletal: Negative.    Neurological: Positive for dizziness.   Psychiatric/Behavioral: Negative.          Objective:      Physical Exam  Constitutional:       General: She is not in acute distress.     Appearance: She is well-developed. She is not diaphoretic.   HENT:      Head: Normocephalic and atraumatic.      Right Ear: External ear normal.      Left Ear: External ear normal.      Nose: Nose normal.      Mouth/Throat:      Pharynx: No oropharyngeal exudate.   Eyes:      General: No scleral icterus.        Right eye: No discharge.         Left eye: No discharge.      Conjunctiva/sclera: Conjunctivae normal.      Pupils: Pupils are equal, round, and reactive to light.   Neck:      Thyroid: No thyromegaly.      Vascular: No JVD.      Trachea: No tracheal deviation.   Cardiovascular:      Rate and Rhythm: Normal rate and regular rhythm.      Heart sounds: Normal heart sounds. No murmur heard.    No friction rub. No gallop.   Pulmonary:      Effort: Pulmonary effort is normal.  No respiratory distress.      Breath sounds: Normal breath sounds. No stridor. No wheezing or rales.   Chest:      Chest wall: No tenderness.   Abdominal:      General: Bowel sounds are normal. There is no distension.      Palpations: Abdomen is soft. There is no mass.      Tenderness: There is no abdominal tenderness. There is no guarding or rebound.   Musculoskeletal:         General: No tenderness. Normal range of motion.      Cervical back: Normal range of motion and neck supple.   Lymphadenopathy:      Cervical: No cervical adenopathy.   Skin:     General: Skin is warm and dry.      Coloration: Skin is not pale.      Findings: No erythema or rash.   Neurological:      Mental Status: She is alert and oriented to person, place, and time.      Cranial Nerves: No cranial nerve deficit.      Motor: No abnormal muscle tone.      Coordination: Coordination normal.      Deep Tendon Reflexes: Reflexes are normal and symmetric.   Psychiatric:         Behavior: Behavior normal.         Thought Content: Thought content normal.         Judgment: Judgment normal.         Assessment:       Problem List Items Addressed This Visit    None     Visit Diagnoses     Essential hypertension    -  Primary    Relevant Medications    hydroCHLOROthiazide (HYDRODIURIL) 12.5 MG Tab    Other Relevant Orders    CBC Auto Differential    Comprehensive Metabolic Panel    Lipid Panel    Urinalysis    Ambulatory referral/consult to Cardiology    Senile osteoporosis        Relevant Orders    Comprehensive Metabolic Panel    Benign paroxysmal positional vertigo due to bilateral vestibular disorder        Relevant Medications    meclizine (ANTIVERT) 12.5 mg tablet    Prediabetes        Relevant Orders    Hemoglobin A1C    Atypical chest pain        Relevant Orders    Ambulatory referral/consult to Cardiology          Plan:         Nasima was seen today for follow-up and hypertension.    Diagnoses and all orders for this visit:    Essential  hypertension  -     hydroCHLOROthiazide (HYDRODIURIL) 12.5 MG Tab; Take 1 tablet (12.5 mg total) by mouth once daily.  -     CBC Auto Differential; Future  -     Comprehensive Metabolic Panel; Future  -     Lipid Panel; Future  -     Urinalysis; Future  -     Ambulatory referral/consult to Cardiology; Future    Senile osteoporosis  -     Comprehensive Metabolic Panel; Future    Benign paroxysmal positional vertigo due to bilateral vestibular disorder  -     meclizine (ANTIVERT) 12.5 mg tablet; Take 1 tablet (12.5 mg total) by mouth 3 (three) times daily as needed for Dizziness.    Prediabetes  -     Hemoglobin A1C; Future    Atypical chest pain  -     Ambulatory referral/consult to Cardiology; Future    Continue monitoring blood pressure at home, low sodium diet.  Epley and Jimmie-Sarah  exercises at home.

## 2022-03-30 ENCOUNTER — PATIENT MESSAGE (OUTPATIENT)
Dept: FAMILY MEDICINE | Facility: CLINIC | Age: 72
End: 2022-03-30
Payer: MEDICARE

## 2022-03-30 LAB
ESTIMATED AVG GLUCOSE: 117 MG/DL (ref 68–131)
HBA1C MFR BLD: 5.7 % (ref 4–5.6)

## 2022-04-05 ENCOUNTER — PES CALL (OUTPATIENT)
Dept: ADMINISTRATIVE | Facility: CLINIC | Age: 72
End: 2022-04-05
Payer: MEDICARE

## 2022-04-05 PROBLEM — I70.0 ATHEROSCLEROSIS OF AORTA: Status: ACTIVE | Noted: 2022-04-05

## 2022-05-03 ENCOUNTER — OFFICE VISIT (OUTPATIENT)
Dept: CARDIOLOGY | Facility: CLINIC | Age: 72
End: 2022-05-03
Payer: MEDICARE

## 2022-05-03 VITALS
RESPIRATION RATE: 20 BRPM | HEART RATE: 76 BPM | DIASTOLIC BLOOD PRESSURE: 78 MMHG | WEIGHT: 130.19 LBS | HEIGHT: 62 IN | SYSTOLIC BLOOD PRESSURE: 143 MMHG | BODY MASS INDEX: 23.96 KG/M2

## 2022-05-03 DIAGNOSIS — R07.89 ATYPICAL CHEST PAIN: ICD-10-CM

## 2022-05-03 DIAGNOSIS — E78.5 HYPERLIPIDEMIA, UNSPECIFIED HYPERLIPIDEMIA TYPE: ICD-10-CM

## 2022-05-03 DIAGNOSIS — I10 PRIMARY HYPERTENSION: Primary | ICD-10-CM

## 2022-05-03 DIAGNOSIS — Z12.31 OTHER SCREENING MAMMOGRAM: ICD-10-CM

## 2022-05-03 DIAGNOSIS — R07.89 OTHER CHEST PAIN: ICD-10-CM

## 2022-05-03 DIAGNOSIS — I10 ESSENTIAL HYPERTENSION: ICD-10-CM

## 2022-05-03 PROCEDURE — 3077F PR MOST RECENT SYSTOLIC BLOOD PRESSURE >= 140 MM HG: ICD-10-PCS | Mod: CPTII,S$GLB,, | Performed by: INTERNAL MEDICINE

## 2022-05-03 PROCEDURE — 1126F PR PAIN SEVERITY QUANTIFIED, NO PAIN PRESENT: ICD-10-PCS | Mod: CPTII,S$GLB,, | Performed by: INTERNAL MEDICINE

## 2022-05-03 PROCEDURE — 4010F PR ACE/ARB THEARPY RXD/TAKEN: ICD-10-PCS | Mod: CPTII,S$GLB,, | Performed by: INTERNAL MEDICINE

## 2022-05-03 PROCEDURE — 93000 EKG 12-LEAD: ICD-10-PCS | Mod: S$GLB,,, | Performed by: INTERNAL MEDICINE

## 2022-05-03 PROCEDURE — 1126F AMNT PAIN NOTED NONE PRSNT: CPT | Mod: CPTII,S$GLB,, | Performed by: INTERNAL MEDICINE

## 2022-05-03 PROCEDURE — 3078F PR MOST RECENT DIASTOLIC BLOOD PRESSURE < 80 MM HG: ICD-10-PCS | Mod: CPTII,S$GLB,, | Performed by: INTERNAL MEDICINE

## 2022-05-03 PROCEDURE — 99999 PR PBB SHADOW E&M-EST. PATIENT-LVL III: CPT | Mod: PBBFAC,,, | Performed by: INTERNAL MEDICINE

## 2022-05-03 PROCEDURE — 1101F PT FALLS ASSESS-DOCD LE1/YR: CPT | Mod: CPTII,S$GLB,, | Performed by: INTERNAL MEDICINE

## 2022-05-03 PROCEDURE — 4010F ACE/ARB THERAPY RXD/TAKEN: CPT | Mod: CPTII,S$GLB,, | Performed by: INTERNAL MEDICINE

## 2022-05-03 PROCEDURE — 3008F BODY MASS INDEX DOCD: CPT | Mod: CPTII,S$GLB,, | Performed by: INTERNAL MEDICINE

## 2022-05-03 PROCEDURE — 3288F PR FALLS RISK ASSESSMENT DOCUMENTED: ICD-10-PCS | Mod: CPTII,S$GLB,, | Performed by: INTERNAL MEDICINE

## 2022-05-03 PROCEDURE — 99999 PR PBB SHADOW E&M-EST. PATIENT-LVL III: ICD-10-PCS | Mod: PBBFAC,,, | Performed by: INTERNAL MEDICINE

## 2022-05-03 PROCEDURE — 93000 ELECTROCARDIOGRAM COMPLETE: CPT | Mod: S$GLB,,, | Performed by: INTERNAL MEDICINE

## 2022-05-03 PROCEDURE — 99204 OFFICE O/P NEW MOD 45 MIN: CPT | Mod: 25,S$GLB,, | Performed by: INTERNAL MEDICINE

## 2022-05-03 PROCEDURE — 1159F MED LIST DOCD IN RCRD: CPT | Mod: CPTII,S$GLB,, | Performed by: INTERNAL MEDICINE

## 2022-05-03 PROCEDURE — 1159F PR MEDICATION LIST DOCUMENTED IN MEDICAL RECORD: ICD-10-PCS | Mod: CPTII,S$GLB,, | Performed by: INTERNAL MEDICINE

## 2022-05-03 PROCEDURE — 3078F DIAST BP <80 MM HG: CPT | Mod: CPTII,S$GLB,, | Performed by: INTERNAL MEDICINE

## 2022-05-03 PROCEDURE — 99204 PR OFFICE/OUTPT VISIT, NEW, LEVL IV, 45-59 MIN: ICD-10-PCS | Mod: 25,S$GLB,, | Performed by: INTERNAL MEDICINE

## 2022-05-03 PROCEDURE — 1160F RVW MEDS BY RX/DR IN RCRD: CPT | Mod: CPTII,S$GLB,, | Performed by: INTERNAL MEDICINE

## 2022-05-03 PROCEDURE — 3008F PR BODY MASS INDEX (BMI) DOCUMENTED: ICD-10-PCS | Mod: CPTII,S$GLB,, | Performed by: INTERNAL MEDICINE

## 2022-05-03 PROCEDURE — 3044F PR MOST RECENT HEMOGLOBIN A1C LEVEL <7.0%: ICD-10-PCS | Mod: CPTII,S$GLB,, | Performed by: INTERNAL MEDICINE

## 2022-05-03 PROCEDURE — 1101F PR PT FALLS ASSESS DOC 0-1 FALLS W/OUT INJ PAST YR: ICD-10-PCS | Mod: CPTII,S$GLB,, | Performed by: INTERNAL MEDICINE

## 2022-05-03 PROCEDURE — 3288F FALL RISK ASSESSMENT DOCD: CPT | Mod: CPTII,S$GLB,, | Performed by: INTERNAL MEDICINE

## 2022-05-03 PROCEDURE — 3077F SYST BP >= 140 MM HG: CPT | Mod: CPTII,S$GLB,, | Performed by: INTERNAL MEDICINE

## 2022-05-03 PROCEDURE — 3044F HG A1C LEVEL LT 7.0%: CPT | Mod: CPTII,S$GLB,, | Performed by: INTERNAL MEDICINE

## 2022-05-03 PROCEDURE — 1160F PR REVIEW ALL MEDS BY PRESCRIBER/CLIN PHARMACIST DOCUMENTED: ICD-10-PCS | Mod: CPTII,S$GLB,, | Performed by: INTERNAL MEDICINE

## 2022-05-03 NOTE — ASSESSMENT & PLAN NOTE
Bps slightly above goal. Cont amlodipine 10-benazepril 20x1. Recommend daily dosing of HCTZ. Pt should start checking Bps at home and return with a log.    Pt to start walking program, 20 minutes day.

## 2022-05-03 NOTE — ASSESSMENT & PLAN NOTE
Non-cardiac in nature. Nml ecg and physical exam. Will continue with conservative management at this time.

## 2022-05-03 NOTE — PROGRESS NOTES
HPI: 72 yo F w h/o hypertension and hyperlipidemia presenting for initial evaluation by me.     She comes in for evaluation of chest discomfort. She noted a few episodes overnight lasting seconds at a time. Described as a pulsation. No repeat episodes since waking up this morning. No SOB or [place]. No change in exercise capacity. Mild activity at baseline. Completes ADLs, but not much more.     She tolerates amlodipine 10-benazepril 20x1, and atorvastatin 10 3x/week. She takes hctz 5x/week for le swelling. Mild, intermittent edema usually in the evenings without any discomfort. If she doesn't have swelling she does not take it.       PHYSICAL EXAM:  Vitals:    05/03/22 1456   BP: (!) 143/78   Pulse: 76   Resp: 20       Physical Exam  Constitutional:       Appearance: Normal appearance.   Neck:      Vascular: No carotid bruit or JVD.   Cardiovascular:      Rate and Rhythm: Normal rate and regular rhythm.      Pulses:           Radial pulses are 2+ on the right side and 2+ on the left side.        Dorsalis pedis pulses are 1+ on the right side and 1+ on the left side.      Heart sounds: S1 normal and S2 normal. No murmur heard.    No S3 sounds.   Pulmonary:      Effort: Pulmonary effort is normal.      Breath sounds: Normal breath sounds. No rales.   Neurological:      Mental Status: She is alert.       LABS/CARDIAC TESTS:  March 2022 hb 14.3, Cr 0.7.  and HDL 57. A1c 5.7.   ECG today SR no Qs/STs.      ASSESSMENT & PLAN:    Hypertension  Bps slightly above goal. Cont amlodipine 10-benazepril 20x1. Recommend daily dosing of HCTZ. Pt should start checking Bps at home and return with a log.    Pt to start walking program, 20 minutes day.     Hyperlipidemia  . Recommend daily dosing of atorvastatin 10x1.     Other chest pain  Non-cardiac in nature. Nml ecg and physical exam. Will continue with conservative management at this time.      Primary hypertension    Essential hypertension  -     Ambulatory  referral/consult to Cardiology    Atypical chest pain  -     Ambulatory referral/consult to Cardiology    Hyperlipidemia, unspecified hyperlipidemia type    Other chest pain  -     IN OFFICE EKG 12-LEAD (to [place])        Alysa Wood MD

## 2022-05-16 ENCOUNTER — PATIENT OUTREACH (OUTPATIENT)
Dept: ADMINISTRATIVE | Facility: OTHER | Age: 72
End: 2022-05-16
Payer: MEDICARE

## 2022-05-16 DIAGNOSIS — Z12.31 SCREENING MAMMOGRAM FOR BREAST CANCER: Primary | ICD-10-CM

## 2022-05-16 DIAGNOSIS — Z12.11 COLON CANCER SCREENING: ICD-10-CM

## 2022-05-16 NOTE — PROGRESS NOTES
Health Maintenance Due   Topic Date Due    TETANUS VACCINE  Never done    Shingles Vaccine (1 of 2) Never done    COVID-19 Vaccine (3 - Booster for Pfizer series) 07/26/2021    Mammogram  12/07/2021    Colorectal Cancer Screening  03/15/2022     Updates were requested from care everywhere.  DIS/Chart was reviewed for overdue Proactive [facility] Encounters (ANALY) topics (CRS, Breast Cancer Screening, Eye exam)  Health Maintenance has been updated.  LINKS immunization registry triggered.  Immunizations were reconciled.  Orders entered:  Fit kit, screening mammogram

## 2022-05-24 ENCOUNTER — OFFICE VISIT (OUTPATIENT)
Dept: HOME HEALTH SERVICES | Facility: CLINIC | Age: 72
End: 2022-05-24
Payer: MEDICARE

## 2022-05-24 ENCOUNTER — TELEPHONE (OUTPATIENT)
Dept: HOME HEALTH SERVICES | Facility: CLINIC | Age: 72
End: 2022-05-24

## 2022-05-24 VITALS
BODY MASS INDEX: 23.92 KG/M2 | HEART RATE: 68 BPM | DIASTOLIC BLOOD PRESSURE: 76 MMHG | HEIGHT: 62 IN | OXYGEN SATURATION: 98 % | WEIGHT: 130 LBS | SYSTOLIC BLOOD PRESSURE: 135 MMHG

## 2022-05-24 DIAGNOSIS — E78.5 HYPERLIPIDEMIA, UNSPECIFIED HYPERLIPIDEMIA TYPE: ICD-10-CM

## 2022-05-24 DIAGNOSIS — I10 PRIMARY HYPERTENSION: ICD-10-CM

## 2022-05-24 DIAGNOSIS — I70.0 ATHEROSCLEROSIS OF AORTA: ICD-10-CM

## 2022-05-24 DIAGNOSIS — Z00.00 ENCOUNTER FOR PREVENTIVE HEALTH EXAMINATION: Primary | ICD-10-CM

## 2022-05-24 DIAGNOSIS — H81.10 BENIGN PAROXYSMAL POSITIONAL VERTIGO, UNSPECIFIED LATERALITY: ICD-10-CM

## 2022-05-24 PROCEDURE — 1126F AMNT PAIN NOTED NONE PRSNT: CPT | Mod: CPTII,S$GLB,, | Performed by: NURSE PRACTITIONER

## 2022-05-24 PROCEDURE — 1159F MED LIST DOCD IN RCRD: CPT | Mod: CPTII,S$GLB,, | Performed by: NURSE PRACTITIONER

## 2022-05-24 PROCEDURE — 3078F DIAST BP <80 MM HG: CPT | Mod: CPTII,S$GLB,, | Performed by: NURSE PRACTITIONER

## 2022-05-24 PROCEDURE — 3044F HG A1C LEVEL LT 7.0%: CPT | Mod: CPTII,S$GLB,, | Performed by: NURSE PRACTITIONER

## 2022-05-24 PROCEDURE — 1126F PR PAIN SEVERITY QUANTIFIED, NO PAIN PRESENT: ICD-10-PCS | Mod: CPTII,S$GLB,, | Performed by: NURSE PRACTITIONER

## 2022-05-24 PROCEDURE — G0439 PPPS, SUBSEQ VISIT: HCPCS | Mod: S$GLB,,, | Performed by: NURSE PRACTITIONER

## 2022-05-24 PROCEDURE — 1101F PT FALLS ASSESS-DOCD LE1/YR: CPT | Mod: CPTII,S$GLB,, | Performed by: NURSE PRACTITIONER

## 2022-05-24 PROCEDURE — 3078F PR MOST RECENT DIASTOLIC BLOOD PRESSURE < 80 MM HG: ICD-10-PCS | Mod: CPTII,S$GLB,, | Performed by: NURSE PRACTITIONER

## 2022-05-24 PROCEDURE — 4010F PR ACE/ARB THEARPY RXD/TAKEN: ICD-10-PCS | Mod: CPTII,S$GLB,, | Performed by: NURSE PRACTITIONER

## 2022-05-24 PROCEDURE — 1101F PR PT FALLS ASSESS DOC 0-1 FALLS W/OUT INJ PAST YR: ICD-10-PCS | Mod: CPTII,S$GLB,, | Performed by: NURSE PRACTITIONER

## 2022-05-24 PROCEDURE — 3075F PR MOST RECENT SYSTOLIC BLOOD PRESS GE 130-139MM HG: ICD-10-PCS | Mod: CPTII,S$GLB,, | Performed by: NURSE PRACTITIONER

## 2022-05-24 PROCEDURE — 3044F PR MOST RECENT HEMOGLOBIN A1C LEVEL <7.0%: ICD-10-PCS | Mod: CPTII,S$GLB,, | Performed by: NURSE PRACTITIONER

## 2022-05-24 PROCEDURE — 3288F FALL RISK ASSESSMENT DOCD: CPT | Mod: CPTII,S$GLB,, | Performed by: NURSE PRACTITIONER

## 2022-05-24 PROCEDURE — 4010F ACE/ARB THERAPY RXD/TAKEN: CPT | Mod: CPTII,S$GLB,, | Performed by: NURSE PRACTITIONER

## 2022-05-24 PROCEDURE — 3008F BODY MASS INDEX DOCD: CPT | Mod: CPTII,S$GLB,, | Performed by: NURSE PRACTITIONER

## 2022-05-24 PROCEDURE — 1160F RVW MEDS BY RX/DR IN RCRD: CPT | Mod: CPTII,S$GLB,, | Performed by: NURSE PRACTITIONER

## 2022-05-24 PROCEDURE — 3075F SYST BP GE 130 - 139MM HG: CPT | Mod: CPTII,S$GLB,, | Performed by: NURSE PRACTITIONER

## 2022-05-24 PROCEDURE — 3008F PR BODY MASS INDEX (BMI) DOCUMENTED: ICD-10-PCS | Mod: CPTII,S$GLB,, | Performed by: NURSE PRACTITIONER

## 2022-05-24 PROCEDURE — G0439 PR MEDICARE ANNUAL WELLNESS SUBSEQUENT VISIT: ICD-10-PCS | Mod: S$GLB,,, | Performed by: NURSE PRACTITIONER

## 2022-05-24 PROCEDURE — 1160F PR REVIEW ALL MEDS BY PRESCRIBER/CLIN PHARMACIST DOCUMENTED: ICD-10-PCS | Mod: CPTII,S$GLB,, | Performed by: NURSE PRACTITIONER

## 2022-05-24 PROCEDURE — 1159F PR MEDICATION LIST DOCUMENTED IN MEDICAL RECORD: ICD-10-PCS | Mod: CPTII,S$GLB,, | Performed by: NURSE PRACTITIONER

## 2022-05-24 PROCEDURE — 3288F PR FALLS RISK ASSESSMENT DOCUMENTED: ICD-10-PCS | Mod: CPTII,S$GLB,, | Performed by: NURSE PRACTITIONER

## 2022-05-24 PROCEDURE — 99499 UNLISTED E&M SERVICE: CPT | Mod: S$GLB,,, | Performed by: NURSE PRACTITIONER

## 2022-05-24 PROCEDURE — 99499 RISK ADDL DX/OHS AUDIT: ICD-10-PCS | Mod: S$GLB,,, | Performed by: NURSE PRACTITIONER

## 2022-05-24 RX ORDER — TETANUS TOXOID, REDUCED DIPHTHERIA TOXOID AND ACELLULAR PERTUSSIS VACCINE, ADSORBED 5; 2.5; 8; 8; 2.5 [IU]/.5ML; [IU]/.5ML; UG/.5ML; UG/.5ML; UG/.5ML
0.5 SUSPENSION INTRAMUSCULAR ONCE
Qty: 0.5 ML | Refills: 0 | Status: SHIPPED | OUTPATIENT
Start: 2022-05-24 | End: 2022-05-24

## 2022-05-24 NOTE — PROGRESS NOTES
"  Nasima Pacheco presented for a  Medicare AWV and comprehensive Health Risk Assessment today. The following components were reviewed and updated:    · Medical history  · Family History  · Social history  · Allergies and Current Medications  · Health Risk Assessment  · Health Maintenance  · Care Team         ** See Completed Assessments for Annual Wellness Visit within the encounter summary.**         The following assessments were completed:  · Living Situation  · CAGE  · Depression Screening  · Timed Get Up and Go  · Whisper Test  · Cognitive Function Screening  · Nutrition Screening  · ADL Screening  · PAQ Screening        Vitals:    05/24/22 0819   BP: 135/76   Pulse: 68   SpO2: 98%   Weight: 59 kg (130 lb)   Height: 5' 2" (1.575 m)     Body mass index is 23.78 kg/m².  Physical Exam  Constitutional:       Appearance: Normal appearance.   HENT:      Head: Normocephalic and atraumatic.      Nose: Nose normal.   Eyes:      Extraocular Movements: Extraocular movements intact.      Pupils: Pupils are equal, round, and reactive to light.   Cardiovascular:      Rate and Rhythm: Normal rate and regular rhythm.      Pulses: Normal pulses.      Heart sounds: Normal heart sounds.   Pulmonary:      Effort: Pulmonary effort is normal.      Breath sounds: Normal breath sounds.   Musculoskeletal:      Cervical back: Normal range of motion and neck supple.   Neurological:      General: No focal deficit present.      Mental Status: She is alert and oriented to person, place, and time.               Diagnoses and health risks identified today and associated recommendations/orders:    1. Encounter for preventive health examination  AWv completed    2. Atherosclerosis of aorta  Chronic and stable. Continue current treatment. Follow with PCP.  On statin     3. Hyperlipidemia, unspecified hyperlipidemia type  Chronic and stable. Continue current treatment. Follow with PCP.  On statin    4. Primary hypertension  Chronic and " stable. Continue current treatment. Follow with PCP.  On BP meds    5. Benign paroxysmal positional vertigo, unspecified laterality  Chronic and stable. Continue current treatment. Follow with PCP.  Stable has meds as needed       Provided Nasima with a 5-10 year written screening schedule and personal prevention plan. Recommendations were developed using the USPSTF age appropriate recommendations. Education, counseling, and referrals were provided as needed. After Visit Summary printed and given to patient which includes a list of additional screenings\tests needed.    Fu in 1 yr for AWV          Elza Medeiros, WOOD    I offered to discuss advanced care planning, including how to pick a person who would make decisions for you if you were unable to make them for yourself, called a health care power of , and what kind of decisions you might make such as use of life sustaining treatments such as ventilators and tube feeding when faced with a life limiting illness recorded on a living will that they will need to know. (How you want to be cared for as you near the end of your natural life)     X Patient is interested in learning more about how to make advanced directives.  I provided them paperwork and offered to discuss this with them.

## 2022-05-27 PROBLEM — H81.10 BPPV (BENIGN PAROXYSMAL POSITIONAL VERTIGO): Status: ACTIVE | Noted: 2022-05-27

## 2022-05-27 PROBLEM — Z98.890 POST-OPERATIVE STATE: Status: RESOLVED | Noted: 2017-04-24 | Resolved: 2022-05-27

## 2022-05-27 NOTE — PATIENT INSTRUCTIONS
Counseling and Referral of Other Preventative  (Italic type indicates deductible and co-insurance are waived)    Patient Name: Nasima Pacheco  Today's Date: 5/27/2022    Health Maintenance       Date Due Completion Date    TETANUS VACCINE Never done ---    Shingles Vaccine (1 of 2) Never done ---    COVID-19 Vaccine (3 - Booster for Pfizer series) 07/26/2021 2/26/2021    Mammogram 12/07/2021 12/7/2020    Colorectal Cancer Screening 03/15/2022 3/15/2021    DEXA Scan 06/14/2023 6/14/2021    Lipid Panel 03/29/2027 3/29/2022        No orders of the defined types were placed in this encounter.    The following information is provided to all patients.  This information is to help you find resources for any of the problems found today that may be affecting your health:                Living healthy guide: www.[facility].[place].gov      Understanding Diabetes: www.diabetes.org      Eating healthy: www.cdc.gov/healthyweight      [facility] home safety checklist: www.cdc.gov/steadi/patient.html      Agency on Aging: www.goea.[place].[facility]      Alcoholics anonymous (AA): www.aa.org      Physical Activity: www.dione.nih.gov/ds9faei      Tobacco use: www.quitwithusla.org

## 2022-05-31 ENCOUNTER — PATIENT MESSAGE (OUTPATIENT)
Dept: ADMINISTRATIVE | Facility: HOSPITAL | Age: 72
End: 2022-05-31
Payer: MEDICARE

## 2022-06-02 ENCOUNTER — TELEPHONE (OUTPATIENT)
Dept: OTOLARYNGOLOGY | Facility: CLINIC | Age: 72
End: 2022-06-02
Payer: MEDICARE

## 2022-06-03 ENCOUNTER — TELEPHONE (OUTPATIENT)
Dept: FAMILY MEDICINE | Facility: CLINIC | Age: 72
End: 2022-06-03
Payer: MEDICARE

## 2022-06-03 ENCOUNTER — PATIENT MESSAGE (OUTPATIENT)
Dept: FAMILY MEDICINE | Facility: CLINIC | Age: 72
End: 2022-06-03
Payer: MEDICARE

## 2022-06-03 NOTE — TELEPHONE ENCOUNTER
----- Message from Marly Ohara sent at 6/3/2022  2:53 PM CDT -----  Type:  Needs Medical Advice    Who Called: Pt  Symptoms (please be specific): Pt needs to know what vaccine she needs to take    Would the patient rather a call back or a response via [facility]? call  Best Call Back Number: [phone]  Additional Information: n/a

## 2022-06-27 ENCOUNTER — HOSPITAL ENCOUNTER (OUTPATIENT)
Dept: RADIOLOGY | Facility: HOSPITAL | Age: 72
Discharge: HOME | End: 2022-06-27
Attending: FAMILY MEDICINE
Payer: MEDICARE

## 2022-06-27 VITALS — HEIGHT: 60 IN | BODY MASS INDEX: 25.52 KG/M2 | WEIGHT: 130 LBS

## 2022-06-27 DIAGNOSIS — E78.5 DYSLIPIDEMIA: ICD-10-CM

## 2022-06-27 DIAGNOSIS — E78.5 HYPERLIPIDEMIA, UNSPECIFIED HYPERLIPIDEMIA TYPE: ICD-10-CM

## 2022-06-27 DIAGNOSIS — Z12.31 SCREENING MAMMOGRAM FOR BREAST CANCER: ICD-10-CM

## 2022-06-27 PROCEDURE — 77063 BREAST TOMOSYNTHESIS BI: CPT | Mod: TC,PO

## 2022-06-27 PROCEDURE — 77067 SCR MAMMO BI INCL CAD: CPT | Mod: TC,PO

## 2022-06-27 PROCEDURE — 77063 MAMMO DIGITAL SCREENING BILAT WITH TOMO: ICD-10-PCS | Mod: 26,,, | Performed by: RADIOLOGY

## 2022-06-27 PROCEDURE — 77067 SCR MAMMO BI INCL CAD: CPT | Mod: 26,,, | Performed by: RADIOLOGY

## 2022-06-27 PROCEDURE — 77067 MAMMO DIGITAL SCREENING BILAT WITH TOMO: ICD-10-PCS | Mod: 26,,, | Performed by: RADIOLOGY

## 2022-06-27 PROCEDURE — 77063 BREAST TOMOSYNTHESIS BI: CPT | Mod: 26,,, | Performed by: RADIOLOGY

## 2022-06-27 RX ORDER — ATORVASTATIN CALCIUM 10 MG/1
TABLET, FILM COATED ORAL
Qty: 90 TABLET | Refills: 3 | Status: SHIPPED | OUTPATIENT
Start: 2022-06-27 | End: 2022-09-13 | Stop reason: SDUPTHER

## 2022-06-27 NOTE — TELEPHONE ENCOUNTER
No new care gaps identified.  [facility] Embedded Care Gaps. Reference number: [id number]. 6/27/2022   5:31:53 AM KOTAT

## 2022-07-12 NOTE — PLAN OF CARE
Patient prefers to have [facility] present for discharge teaching. Please contact them @[phone].   
.

## 2022-08-15 ENCOUNTER — PATIENT OUTREACH (OUTPATIENT)
Dept: ADMINISTRATIVE | Facility: HOSPITAL | Age: 72
End: 2022-08-15
Payer: MEDICARE

## 2022-08-15 NOTE — PROGRESS NOTES
08/15/2022 Care Everywhere updates requested and reviewed.  Immunizations reconciled. Media reports reviewed.  Duplicate HM overrides and  orders removed.  Overdue HM topic chart audit and/or requested.  Overdue lab testing linked to upcoming lab appointments if applies. Portal outreached regarding Health maintenance     Health Maintenance Due   Topic Date Due    TETANUS VACCINE  Never done    Shingles Vaccine (1 of 2) Never done    COVID-19 Vaccine (3 - Booster for Pfizer series) 2021    Colorectal Cancer Screening  03/15/2022

## 2022-09-13 ENCOUNTER — OFFICE VISIT (OUTPATIENT)
Dept: INTERNAL MEDICINE | Facility: CLINIC | Age: 72
End: 2022-09-13
Payer: MEDICARE

## 2022-09-13 ENCOUNTER — LAB VISIT (OUTPATIENT)
Dept: LAB | Facility: HOSPITAL | Age: 72
End: 2022-09-13
Attending: STUDENT IN AN ORGANIZED HEALTH CARE EDUCATION/TRAINING PROGRAM
Payer: MEDICARE

## 2022-09-13 VITALS
HEART RATE: 85 BPM | DIASTOLIC BLOOD PRESSURE: 73 MMHG | HEIGHT: 62 IN | SYSTOLIC BLOOD PRESSURE: 135 MMHG | WEIGHT: 130.88 LBS | BODY MASS INDEX: 24.09 KG/M2

## 2022-09-13 DIAGNOSIS — I10 PRIMARY HYPERTENSION: Primary | ICD-10-CM

## 2022-09-13 DIAGNOSIS — E55.9 VITAMIN D DEFICIENCY DISEASE: ICD-10-CM

## 2022-09-13 DIAGNOSIS — I10 PRIMARY HYPERTENSION: ICD-10-CM

## 2022-09-13 DIAGNOSIS — R79.9 ABNORMAL FINDING OF BLOOD CHEMISTRY, UNSPECIFIED: ICD-10-CM

## 2022-09-13 DIAGNOSIS — E78.5 HYPERLIPIDEMIA, UNSPECIFIED HYPERLIPIDEMIA TYPE: ICD-10-CM

## 2022-09-13 DIAGNOSIS — Z23 NEED FOR SHINGLES VACCINE: ICD-10-CM

## 2022-09-13 DIAGNOSIS — H81.13 BENIGN PAROXYSMAL POSITIONAL VERTIGO DUE TO BILATERAL VESTIBULAR DISORDER: ICD-10-CM

## 2022-09-13 DIAGNOSIS — H81.10 BENIGN PAROXYSMAL POSITIONAL VERTIGO, UNSPECIFIED LATERALITY: ICD-10-CM

## 2022-09-13 DIAGNOSIS — E78.5 DYSLIPIDEMIA: ICD-10-CM

## 2022-09-13 DIAGNOSIS — Z63.4 BEREAVEMENT WITHOUT COMPLICATION: ICD-10-CM

## 2022-09-13 DIAGNOSIS — Z00.00 HEALTHCARE MAINTENANCE: ICD-10-CM

## 2022-09-13 DIAGNOSIS — I10 ESSENTIAL HYPERTENSION: ICD-10-CM

## 2022-09-13 DIAGNOSIS — Z78.0 ASYMPTOMATIC MENOPAUSAL STATE: ICD-10-CM

## 2022-09-13 DIAGNOSIS — Z00.00 ENCOUNTER FOR GENERAL MEDICAL EXAMINATION: ICD-10-CM

## 2022-09-13 DIAGNOSIS — M15.9 GENERALIZED OSTEOARTHRITIS: ICD-10-CM

## 2022-09-13 LAB
ANION GAP SERPL CALC-SCNC: 8 MMOL/L (ref 8–16)
BUN SERPL-MCNC: 15 MG/DL (ref 8–23)
CALCIUM SERPL-MCNC: 10 MG/DL (ref 8.7–10.5)
CHLORIDE SERPL-SCNC: 102 MMOL/L (ref 95–110)
CO2 SERPL-SCNC: 27 MMOL/L (ref 23–29)
CREAT SERPL-MCNC: 0.7 MG/DL (ref 0.5–1.4)
EST. GFR  (NO RACE VARIABLE): >60 ML/MIN/1.73 M^2
ESTIMATED AVG GLUCOSE: 117 MG/DL (ref 68–131)
GLUCOSE SERPL-MCNC: 107 MG/DL (ref 70–110)
HBA1C MFR BLD: 5.7 % (ref 4–5.6)
POTASSIUM SERPL-SCNC: 4 MMOL/L (ref 3.5–5.1)
SODIUM SERPL-SCNC: 137 MMOL/L (ref 136–145)

## 2022-09-13 PROCEDURE — 3078F DIAST BP <80 MM HG: CPT | Mod: CPTII,S$GLB,, | Performed by: STUDENT IN AN ORGANIZED HEALTH CARE EDUCATION/TRAINING PROGRAM

## 2022-09-13 PROCEDURE — 3008F PR BODY MASS INDEX (BMI) DOCUMENTED: ICD-10-PCS | Mod: CPTII,S$GLB,, | Performed by: STUDENT IN AN ORGANIZED HEALTH CARE EDUCATION/TRAINING PROGRAM

## 2022-09-13 PROCEDURE — 99999 PR PBB SHADOW E&M-EST. PATIENT-LVL III: CPT | Mod: PBBFAC,,, | Performed by: STUDENT IN AN ORGANIZED HEALTH CARE EDUCATION/TRAINING PROGRAM

## 2022-09-13 PROCEDURE — 4010F ACE/ARB THERAPY RXD/TAKEN: CPT | Mod: CPTII,S$GLB,, | Performed by: STUDENT IN AN ORGANIZED HEALTH CARE EDUCATION/TRAINING PROGRAM

## 2022-09-13 PROCEDURE — 1126F PR PAIN SEVERITY QUANTIFIED, NO PAIN PRESENT: ICD-10-PCS | Mod: CPTII,S$GLB,, | Performed by: STUDENT IN AN ORGANIZED HEALTH CARE EDUCATION/TRAINING PROGRAM

## 2022-09-13 PROCEDURE — 99999 PR PBB SHADOW E&M-EST. PATIENT-LVL III: ICD-10-PCS | Mod: PBBFAC,,, | Performed by: STUDENT IN AN ORGANIZED HEALTH CARE EDUCATION/TRAINING PROGRAM

## 2022-09-13 PROCEDURE — 1101F PT FALLS ASSESS-DOCD LE1/YR: CPT | Mod: CPTII,S$GLB,, | Performed by: STUDENT IN AN ORGANIZED HEALTH CARE EDUCATION/TRAINING PROGRAM

## 2022-09-13 PROCEDURE — 99214 PR OFFICE/OUTPT VISIT, EST, LEVL IV, 30-39 MIN: ICD-10-PCS | Mod: S$GLB,,, | Performed by: STUDENT IN AN ORGANIZED HEALTH CARE EDUCATION/TRAINING PROGRAM

## 2022-09-13 PROCEDURE — 4010F PR ACE/ARB THEARPY RXD/TAKEN: ICD-10-PCS | Mod: CPTII,S$GLB,, | Performed by: STUDENT IN AN ORGANIZED HEALTH CARE EDUCATION/TRAINING PROGRAM

## 2022-09-13 PROCEDURE — 83036 HEMOGLOBIN GLYCOSYLATED A1C: CPT | Performed by: STUDENT IN AN ORGANIZED HEALTH CARE EDUCATION/TRAINING PROGRAM

## 2022-09-13 PROCEDURE — 3044F PR MOST RECENT HEMOGLOBIN A1C LEVEL <7.0%: ICD-10-PCS | Mod: CPTII,S$GLB,, | Performed by: STUDENT IN AN ORGANIZED HEALTH CARE EDUCATION/TRAINING PROGRAM

## 2022-09-13 PROCEDURE — 80048 BASIC METABOLIC PNL TOTAL CA: CPT | Performed by: STUDENT IN AN ORGANIZED HEALTH CARE EDUCATION/TRAINING PROGRAM

## 2022-09-13 PROCEDURE — 1160F PR REVIEW ALL MEDS BY PRESCRIBER/CLIN PHARMACIST DOCUMENTED: ICD-10-PCS | Mod: CPTII,S$GLB,, | Performed by: STUDENT IN AN ORGANIZED HEALTH CARE EDUCATION/TRAINING PROGRAM

## 2022-09-13 PROCEDURE — 1159F PR MEDICATION LIST DOCUMENTED IN MEDICAL RECORD: ICD-10-PCS | Mod: CPTII,S$GLB,, | Performed by: STUDENT IN AN ORGANIZED HEALTH CARE EDUCATION/TRAINING PROGRAM

## 2022-09-13 PROCEDURE — 3075F SYST BP GE 130 - 139MM HG: CPT | Mod: CPTII,S$GLB,, | Performed by: STUDENT IN AN ORGANIZED HEALTH CARE EDUCATION/TRAINING PROGRAM

## 2022-09-13 PROCEDURE — 36415 COLL VENOUS BLD VENIPUNCTURE: CPT | Performed by: STUDENT IN AN ORGANIZED HEALTH CARE EDUCATION/TRAINING PROGRAM

## 2022-09-13 PROCEDURE — 3075F PR MOST RECENT SYSTOLIC BLOOD PRESS GE 130-139MM HG: ICD-10-PCS | Mod: CPTII,S$GLB,, | Performed by: STUDENT IN AN ORGANIZED HEALTH CARE EDUCATION/TRAINING PROGRAM

## 2022-09-13 PROCEDURE — 1101F PR PT FALLS ASSESS DOC 0-1 FALLS W/OUT INJ PAST YR: ICD-10-PCS | Mod: CPTII,S$GLB,, | Performed by: STUDENT IN AN ORGANIZED HEALTH CARE EDUCATION/TRAINING PROGRAM

## 2022-09-13 PROCEDURE — 1126F AMNT PAIN NOTED NONE PRSNT: CPT | Mod: CPTII,S$GLB,, | Performed by: STUDENT IN AN ORGANIZED HEALTH CARE EDUCATION/TRAINING PROGRAM

## 2022-09-13 PROCEDURE — 3044F HG A1C LEVEL LT 7.0%: CPT | Mod: CPTII,S$GLB,, | Performed by: STUDENT IN AN ORGANIZED HEALTH CARE EDUCATION/TRAINING PROGRAM

## 2022-09-13 PROCEDURE — 3078F PR MOST RECENT DIASTOLIC BLOOD PRESSURE < 80 MM HG: ICD-10-PCS | Mod: CPTII,S$GLB,, | Performed by: STUDENT IN AN ORGANIZED HEALTH CARE EDUCATION/TRAINING PROGRAM

## 2022-09-13 PROCEDURE — 3008F BODY MASS INDEX DOCD: CPT | Mod: CPTII,S$GLB,, | Performed by: STUDENT IN AN ORGANIZED HEALTH CARE EDUCATION/TRAINING PROGRAM

## 2022-09-13 PROCEDURE — 3288F PR FALLS RISK ASSESSMENT DOCUMENTED: ICD-10-PCS | Mod: CPTII,S$GLB,, | Performed by: STUDENT IN AN ORGANIZED HEALTH CARE EDUCATION/TRAINING PROGRAM

## 2022-09-13 PROCEDURE — 99214 OFFICE O/P EST MOD 30 MIN: CPT | Mod: S$GLB,,, | Performed by: STUDENT IN AN ORGANIZED HEALTH CARE EDUCATION/TRAINING PROGRAM

## 2022-09-13 PROCEDURE — 3288F FALL RISK ASSESSMENT DOCD: CPT | Mod: CPTII,S$GLB,, | Performed by: STUDENT IN AN ORGANIZED HEALTH CARE EDUCATION/TRAINING PROGRAM

## 2022-09-13 PROCEDURE — 1159F MED LIST DOCD IN RCRD: CPT | Mod: CPTII,S$GLB,, | Performed by: STUDENT IN AN ORGANIZED HEALTH CARE EDUCATION/TRAINING PROGRAM

## 2022-09-13 PROCEDURE — 1160F RVW MEDS BY RX/DR IN RCRD: CPT | Mod: CPTII,S$GLB,, | Performed by: STUDENT IN AN ORGANIZED HEALTH CARE EDUCATION/TRAINING PROGRAM

## 2022-09-13 RX ORDER — FERROUS SULFATE, DRIED 160(50) MG
1 TABLET, EXTENDED RELEASE ORAL 2 TIMES DAILY WITH MEALS
Qty: 180 TABLET | Refills: 3 | Status: SHIPPED | OUTPATIENT
Start: 2022-09-13 | End: 2024-03-21 | Stop reason: SDUPTHER

## 2022-09-13 RX ORDER — FERROUS SULFATE, DRIED 160(50) MG
1 TABLET, EXTENDED RELEASE ORAL 2 TIMES DAILY WITH MEALS
COMMUNITY
End: 2022-09-13 | Stop reason: SDUPTHER

## 2022-09-13 RX ORDER — ATORVASTATIN CALCIUM 10 MG/1
10 TABLET, FILM COATED ORAL DAILY
Qty: 90 TABLET | Refills: 3 | Status: SHIPPED | OUTPATIENT
Start: 2022-09-13 | End: 2023-12-08 | Stop reason: SDUPTHER

## 2022-09-13 RX ORDER — MECLIZINE HCL 12.5 MG 12.5 MG/1
12.5 TABLET ORAL 3 TIMES DAILY PRN
Qty: 90 TABLET | Refills: 0 | Status: SHIPPED | OUTPATIENT
Start: 2022-09-13 | End: 2023-11-10 | Stop reason: SDUPTHER

## 2022-09-13 RX ORDER — AMLODIPINE AND BENAZEPRIL HYDROCHLORIDE 10; 20 MG/1; MG/1
1 CAPSULE ORAL DAILY
Qty: 90 CAPSULE | Refills: 3 | Status: SHIPPED | OUTPATIENT
Start: 2022-09-13 | End: 2023-09-05 | Stop reason: HOSPADM

## 2022-09-13 RX ORDER — HYDROCHLOROTHIAZIDE 12.5 MG/1
12.5 TABLET ORAL DAILY
Qty: 90 TABLET | Refills: 3 | Status: SHIPPED | OUTPATIENT
Start: 2022-09-13 | End: 2023-09-05 | Stop reason: HOSPADM

## 2022-09-13 SDOH — SOCIAL DETERMINANTS OF HEALTH (SDOH): DISSAPEARANCE AND DEATH OF FAMILY MEMBER: Z63.4

## 2022-09-13 NOTE — ASSESSMENT & PLAN NOTE
Controlled. Average BP at home 130/75.  ON amlodipine-benazepril 10-20mg (LOTREL) 10-20 mg per capsule, hctz, will continue.  DASH diet recommended.  Moderate physical activity recommended.

## 2022-09-13 NOTE — ASSESSMENT & PLAN NOTE
Improved, currently on meclizine (ANTIVERT) 12.5 mg tablet, needs refill.  Patient notices improvement with the medication.

## 2022-09-13 NOTE — ASSESSMENT & PLAN NOTE
Will continue to FU, management with diet and atorvastatin 10 mg qd  Recommended to take medication as prescribed and not interrupt.

## 2022-09-13 NOTE — ASSESSMENT & PLAN NOTE
Patient lost her 42 y/o son, Dimas, 1 y ago.  She still feels sad but she is coping well.  Will follow up closely.

## 2022-09-13 NOTE — PROGRESS NOTES
Subjective:       Patient ID: Nasima Pacheco is a 72 y.o. female.    Chief Complaint: Annual Exam    HPI    Patient is a 72 y.o. female , Setsw[place] speaking, with a history of:  HTN  HLD  BPPV  OA    That comes to the clinic for a for a follow up visit.  Patient complaints of feet and ankle swelling mainly at the end of the day and after daily activities. She has noticed some new various veins on her feet.  Patien's BP at home has been on average 125/75, taking her BP medications as prescribed  She admits that she has her statin prescribed but takes it once in a while.    She is still sad about the loss of her son Dimas, who passed 1 year ago, she is feeling better and resuming her social life.      Healthcare Maintenance:  Colonoscopy: not done but has kit for FOBT at home, to be done.  Vaccinations: Pneumonia, Zoster, Tetanus, not done, will be ordered today  COVID vaccination: completed  Depression screening: PHQ2 score = 0    ROS  11-point review of systems done. Negative except for detailed in the HPI.        Objective:      Physical Exam  Vitals and nursing note reviewed.   Constitutional:       Appearance: Normal appearance.   HENT:      Head: Normocephalic and atraumatic.      Right Ear: Tympanic membrane normal.      Left Ear: Tympanic membrane normal.      Nose: Nose normal.      Mouth/Throat:      Mouth: Mucous membranes are moist.      Pharynx: Oropharynx is clear.   Eyes:      Extraocular Movements: Extraocular movements intact.      Conjunctiva/sclera: Conjunctivae normal.      Pupils: Pupils are equal, round, and reactive to light.   Cardiovascular:      Rate and Rhythm: Normal rate and regular rhythm.      Pulses: Normal pulses.      Heart sounds: Normal heart sounds.   Pulmonary:      Effort: Pulmonary effort is normal.      Breath sounds: Normal breath sounds.   Abdominal:      General: Bowel sounds are normal. There is no distension.      Palpations: Abdomen is soft.      Tenderness: There is  no abdominal tenderness.   Musculoskeletal:         General: Normal range of motion.      Cervical back: Normal range of motion and neck supple.      Comments: Mild BL feet and ankle edema   Skin:     General: Skin is warm.   Neurological:      General: No focal deficit present.      Mental Status: She is alert and oriented to person, place, and time. Mental status is at baseline.   Psychiatric:         Mood and Affect: Mood normal.          Assessment:       Problem List Items Addressed This Visit          ENT    BPPV (benign paroxysmal positional vertigo)     Improved, currently on meclizine (ANTIVERT) 12.5 mg tablet, needs refill.  Patient notices improvement with the medication.         Relevant Medications    meclizine (ANTIVERT) 12.5 mg tablet       Cardiac/Vascular    Hyperlipidemia    Relevant Medications    atorvastatin (LIPITOR) 10 MG tablet    Hypertension - Primary     Controlled. Average BP at home 130/75.  ON amlodipine-benazepril 10-20mg (LOTREL) 10-20 mg per capsule, hctz, will continue.  DASH diet recommended.  Moderate physical activity recommended.         Relevant Medications    amlodipine-benazepril 10-20mg (LOTREL) 10-20 mg per capsule    hydroCHLOROthiazide (HYDRODIURIL) 12.5 MG Tab    Other Relevant Orders    BASIC METABOLIC PANEL    HEMOGLOBIN A1C       Orthopedic    Generalized osteoarthritis     Intermittent back pain, shoulders, ankles, with left ankle swelling.  Managed with NSAIDs, improved.  Will continue.  Patient also uses topical oils over joints with some relief.            Other    Bereavement without complication     Patient lost her 44 y/o son, Dimas, 1 y ago.  She still feels sad but she is coping well.  Will follow up closely.          Other Visit Diagnoses       Encounter for general medical examination        Relevant Orders    BASIC METABOLIC PANEL    HEMOGLOBIN A1C    Healthcare maintenance        Relevant Medications    varicella-zoster [id number], PF, (SHINGRIX) 50  mcg/0.5 mL injection    Other Relevant Orders    Influenza - Quadrivalent (Adjuvanted - 65+)    Abnormal finding of blood chemistry, unspecified        Relevant Orders    HEMOGLOBIN A1C    Need for shingles vaccine        Essential hypertension        Relevant Medications    amlodipine-benazepril 10-20mg (LOTREL) 10-20 mg per capsule    hydroCHLOROthiazide (HYDRODIURIL) 12.5 MG Tab    Dyslipidemia        Relevant Medications    atorvastatin (LIPITOR) 10 MG tablet    Vitamin D deficiency disease        Asymptomatic menopausal state        Relevant Medications    calcium-vitamin D3 (OS-MOLLY 500 + D3) 500 mg-5 mcg (200 unit) per tablet              Plan:       - Will vaccinate against shingles and influenza  - Medications refilled  - Will f/u glc and a1c, last time mildly elevated.    Education provided  Lifestyle recommendations given  AVS printed, explained, and given to the patient.  RTC in : 6 months to assess for mood and BP      OLGA PHILLIPS MD, MPH  Internal Medicine  [facility]  [facility]

## 2022-09-13 NOTE — ASSESSMENT & PLAN NOTE
Intermittent back pain, shoulders, ankles, with left ankle swelling.  Managed with NSAIDs, improved.  Will continue.  Patient also uses topical oils over joints with some relief.

## 2022-10-03 ENCOUNTER — PATIENT MESSAGE (OUTPATIENT)
Dept: ADMINISTRATIVE | Facility: HOSPITAL | Age: 72
End: 2022-10-03
Payer: MEDICARE

## 2022-10-04 ENCOUNTER — PATIENT MESSAGE (OUTPATIENT)
Dept: INTERNAL MEDICINE | Facility: CLINIC | Age: 72
End: 2022-10-04
Payer: MEDICARE

## 2022-10-10 ENCOUNTER — DOCUMENTATION ONLY (OUTPATIENT)
Dept: ADMINISTRATIVE | Facility: HOSPITAL | Age: 72
End: 2022-10-10
Payer: MEDICARE

## 2022-10-11 ENCOUNTER — HOSPITAL ENCOUNTER (OUTPATIENT)
Dept: RADIOLOGY | Facility: HOSPITAL | Age: 72
Discharge: HOME | End: 2022-10-11
Attending: STUDENT IN AN ORGANIZED HEALTH CARE EDUCATION/TRAINING PROGRAM
Payer: MEDICARE

## 2022-10-11 ENCOUNTER — OFFICE VISIT (OUTPATIENT)
Dept: INTERNAL MEDICINE | Facility: CLINIC | Age: 72
End: 2022-10-11
Payer: MEDICARE

## 2022-10-11 VITALS
SYSTOLIC BLOOD PRESSURE: 133 MMHG | HEIGHT: 60 IN | WEIGHT: 134.38 LBS | HEART RATE: 82 BPM | BODY MASS INDEX: 26.38 KG/M2 | DIASTOLIC BLOOD PRESSURE: 66 MMHG

## 2022-10-11 DIAGNOSIS — Z00.00 HEALTHCARE MAINTENANCE: ICD-10-CM

## 2022-10-11 DIAGNOSIS — G89.29 CHRONIC MIDLINE LOW BACK PAIN WITHOUT SCIATICA: ICD-10-CM

## 2022-10-11 DIAGNOSIS — M54.6 CHRONIC MIDLINE THORACIC BACK PAIN: ICD-10-CM

## 2022-10-11 DIAGNOSIS — G89.29 CHRONIC MIDLINE THORACIC BACK PAIN: Primary | ICD-10-CM

## 2022-10-11 DIAGNOSIS — M54.50 CHRONIC MIDLINE LOW BACK PAIN WITHOUT SCIATICA: ICD-10-CM

## 2022-10-11 DIAGNOSIS — G89.29 CHRONIC MIDLINE THORACIC BACK PAIN: ICD-10-CM

## 2022-10-11 DIAGNOSIS — M54.6 CHRONIC MIDLINE THORACIC BACK PAIN: Primary | ICD-10-CM

## 2022-10-11 PROCEDURE — 1125F AMNT PAIN NOTED PAIN PRSNT: CPT | Mod: CPTII,S$GLB,, | Performed by: STUDENT IN AN ORGANIZED HEALTH CARE EDUCATION/TRAINING PROGRAM

## 2022-10-11 PROCEDURE — 3008F PR BODY MASS INDEX (BMI) DOCUMENTED: ICD-10-PCS | Mod: CPTII,S$GLB,, | Performed by: STUDENT IN AN ORGANIZED HEALTH CARE EDUCATION/TRAINING PROGRAM

## 2022-10-11 PROCEDURE — 1160F PR REVIEW ALL MEDS BY PRESCRIBER/CLIN PHARMACIST DOCUMENTED: ICD-10-PCS | Mod: CPTII,S$GLB,, | Performed by: STUDENT IN AN ORGANIZED HEALTH CARE EDUCATION/TRAINING PROGRAM

## 2022-10-11 PROCEDURE — 3044F PR MOST RECENT HEMOGLOBIN A1C LEVEL <7.0%: ICD-10-PCS | Mod: CPTII,S$GLB,, | Performed by: STUDENT IN AN ORGANIZED HEALTH CARE EDUCATION/TRAINING PROGRAM

## 2022-10-11 PROCEDURE — 1101F PT FALLS ASSESS-DOCD LE1/YR: CPT | Mod: CPTII,S$GLB,, | Performed by: STUDENT IN AN ORGANIZED HEALTH CARE EDUCATION/TRAINING PROGRAM

## 2022-10-11 PROCEDURE — 1159F PR MEDICATION LIST DOCUMENTED IN MEDICAL RECORD: ICD-10-PCS | Mod: CPTII,S$GLB,, | Performed by: STUDENT IN AN ORGANIZED HEALTH CARE EDUCATION/TRAINING PROGRAM

## 2022-10-11 PROCEDURE — 72100 X-RAY EXAM L-S SPINE 2/3 VWS: CPT | Mod: 26,,, | Performed by: STUDENT IN AN ORGANIZED HEALTH CARE EDUCATION/TRAINING PROGRAM

## 2022-10-11 PROCEDURE — 99214 OFFICE O/P EST MOD 30 MIN: CPT | Mod: S$GLB,,, | Performed by: STUDENT IN AN ORGANIZED HEALTH CARE EDUCATION/TRAINING PROGRAM

## 2022-10-11 PROCEDURE — 3044F HG A1C LEVEL LT 7.0%: CPT | Mod: CPTII,S$GLB,, | Performed by: STUDENT IN AN ORGANIZED HEALTH CARE EDUCATION/TRAINING PROGRAM

## 2022-10-11 PROCEDURE — 3075F PR MOST RECENT SYSTOLIC BLOOD PRESS GE 130-139MM HG: ICD-10-PCS | Mod: CPTII,S$GLB,, | Performed by: STUDENT IN AN ORGANIZED HEALTH CARE EDUCATION/TRAINING PROGRAM

## 2022-10-11 PROCEDURE — 99999 PR PBB SHADOW E&M-EST. PATIENT-LVL IV: CPT | Mod: PBBFAC,,, | Performed by: STUDENT IN AN ORGANIZED HEALTH CARE EDUCATION/TRAINING PROGRAM

## 2022-10-11 PROCEDURE — 1101F PR PT FALLS ASSESS DOC 0-1 FALLS W/OUT INJ PAST YR: ICD-10-PCS | Mod: CPTII,S$GLB,, | Performed by: STUDENT IN AN ORGANIZED HEALTH CARE EDUCATION/TRAINING PROGRAM

## 2022-10-11 PROCEDURE — 3288F FALL RISK ASSESSMENT DOCD: CPT | Mod: CPTII,S$GLB,, | Performed by: STUDENT IN AN ORGANIZED HEALTH CARE EDUCATION/TRAINING PROGRAM

## 2022-10-11 PROCEDURE — 3078F DIAST BP <80 MM HG: CPT | Mod: CPTII,S$GLB,, | Performed by: STUDENT IN AN ORGANIZED HEALTH CARE EDUCATION/TRAINING PROGRAM

## 2022-10-11 PROCEDURE — 3075F SYST BP GE 130 - 139MM HG: CPT | Mod: CPTII,S$GLB,, | Performed by: STUDENT IN AN ORGANIZED HEALTH CARE EDUCATION/TRAINING PROGRAM

## 2022-10-11 PROCEDURE — 1159F MED LIST DOCD IN RCRD: CPT | Mod: CPTII,S$GLB,, | Performed by: STUDENT IN AN ORGANIZED HEALTH CARE EDUCATION/TRAINING PROGRAM

## 2022-10-11 PROCEDURE — 3078F PR MOST RECENT DIASTOLIC BLOOD PRESSURE < 80 MM HG: ICD-10-PCS | Mod: CPTII,S$GLB,, | Performed by: STUDENT IN AN ORGANIZED HEALTH CARE EDUCATION/TRAINING PROGRAM

## 2022-10-11 PROCEDURE — 4010F ACE/ARB THERAPY RXD/TAKEN: CPT | Mod: CPTII,S$GLB,, | Performed by: STUDENT IN AN ORGANIZED HEALTH CARE EDUCATION/TRAINING PROGRAM

## 2022-10-11 PROCEDURE — 3288F PR FALLS RISK ASSESSMENT DOCUMENTED: ICD-10-PCS | Mod: CPTII,S$GLB,, | Performed by: STUDENT IN AN ORGANIZED HEALTH CARE EDUCATION/TRAINING PROGRAM

## 2022-10-11 PROCEDURE — 99999 PR PBB SHADOW E&M-EST. PATIENT-LVL IV: ICD-10-PCS | Mod: PBBFAC,,, | Performed by: STUDENT IN AN ORGANIZED HEALTH CARE EDUCATION/TRAINING PROGRAM

## 2022-10-11 PROCEDURE — 72100 X-RAY EXAM L-S SPINE 2/3 VWS: CPT | Mod: TC

## 2022-10-11 PROCEDURE — 99214 PR OFFICE/OUTPT VISIT, EST, LEVL IV, 30-39 MIN: ICD-10-PCS | Mod: S$GLB,,, | Performed by: STUDENT IN AN ORGANIZED HEALTH CARE EDUCATION/TRAINING PROGRAM

## 2022-10-11 PROCEDURE — 3008F BODY MASS INDEX DOCD: CPT | Mod: CPTII,S$GLB,, | Performed by: STUDENT IN AN ORGANIZED HEALTH CARE EDUCATION/TRAINING PROGRAM

## 2022-10-11 PROCEDURE — 4010F PR ACE/ARB THEARPY RXD/TAKEN: ICD-10-PCS | Mod: CPTII,S$GLB,, | Performed by: STUDENT IN AN ORGANIZED HEALTH CARE EDUCATION/TRAINING PROGRAM

## 2022-10-11 PROCEDURE — 72100 XR LUMBAR SPINE AP AND LATERAL: ICD-10-PCS | Mod: 26,,, | Performed by: STUDENT IN AN ORGANIZED HEALTH CARE EDUCATION/TRAINING PROGRAM

## 2022-10-11 PROCEDURE — 1125F PR PAIN SEVERITY QUANTIFIED, PAIN PRESENT: ICD-10-PCS | Mod: CPTII,S$GLB,, | Performed by: STUDENT IN AN ORGANIZED HEALTH CARE EDUCATION/TRAINING PROGRAM

## 2022-10-11 PROCEDURE — 1160F RVW MEDS BY RX/DR IN RCRD: CPT | Mod: CPTII,S$GLB,, | Performed by: STUDENT IN AN ORGANIZED HEALTH CARE EDUCATION/TRAINING PROGRAM

## 2022-10-11 NOTE — ASSESSMENT & PLAN NOTE
Acute on chronic thoracic pain, incapacitating.  Concern of increased scoliosis.  Changes in posture noticed.  Will refer to ortho.

## 2022-10-11 NOTE — PROGRESS NOTES
Subjective:       Patient ID: Nasima Pacheco is a 72 y.o. female.    Chief Complaint: Back Pain    HPI    Patient is a 72 y.o. female , [place] speaking, with a history of:  HTN  HLD  BPPV  OA    Patient comes to the clinic complaining of back pain.    Patient states she had acute, sharp, incapacitating back pain mainly in her thoracic spine, and radiated to the lumbar spine.  She also has a concern of increased deformity of her thoracic spine, with pain.  Latest XR of spine from 2018.  Pain is alleviated with rest and tylenol.      Healthcare Maintenance:  Colonoscopy: not done but has kit for FOBT at home, to be done.  Vaccinations: Pneumonia, Zoster, Tetanus, not done, already ordered.  COVID vaccination: completed  Depression screening: PHQ2 score = 0    ROS  11-point review of systems done. Negative except for detailed in the HPI.        Objective:      Physical Exam  Vitals and nursing note reviewed.   Constitutional:       Appearance: Normal appearance.   HENT:      Head: Normocephalic and atraumatic.      Right Ear: Tympanic membrane normal.      Left Ear: Tympanic membrane normal.      Nose: Nose normal.      Mouth/Throat:      Mouth: Mucous membranes are moist.      Pharynx: Oropharynx is clear.   Eyes:      Extraocular Movements: Extraocular movements intact.      Conjunctiva/sclera: Conjunctivae normal.      Pupils: Pupils are equal, round, and reactive to light.   Cardiovascular:      Rate and Rhythm: Normal rate and regular rhythm.      Pulses: Normal pulses.      Heart sounds: Normal heart sounds.   Pulmonary:      Effort: Pulmonary effort is normal.      Breath sounds: Normal breath sounds.   Abdominal:      General: Bowel sounds are normal. There is no distension.      Palpations: Abdomen is soft.      Tenderness: There is no abdominal tenderness.   Musculoskeletal:         General: Deformity present. Normal range of motion.      Cervical back: Normal range of motion and neck supple.       Comments: Right sided deviation of thoracic spine   Skin:     General: Skin is warm.   Neurological:      General: No focal deficit present.      Mental Status: She is alert and oriented to person, place, and time. Mental status is at baseline.   Psychiatric:         Mood and Affect: Mood normal.          Assessment:       Problem List Items Addressed This Visit          Orthopedic    Chronic midline thoracic back pain - Primary     Acute on chronic thoracic pain, incapacitating.  Concern of increased scoliosis.  Changes in posture noticed.  Will refer to ortho.           Relevant Orders    X-Ray Lumbar Spine Ap And Lateral    X-Ray Thoracic Spine 4 or more views    Ambulatory referral/consult to Orthopedics    Chronic midline low back pain without sciatica     Acute on chronic pain.  Latest L xr from 2018: Results: AP, lateral, spot L5-S1 views. Mild dextroscoliosis of the lumbar spine.  The vertebral body heights are well-maintained,moderate disc space narrowing L4-L5 and L5-S1.  No osseous lesions or fracture identified. Atherosclerotic plaque of the aorta .  Will repeat XR and will refer to ortho  Continue Tylenol for pain.         Relevant Orders    X-Ray Lumbar Spine Ap And Lateral    X-Ray Thoracic Spine 4 or more views    Ambulatory referral/consult to Orthopedics       Other    Healthcare maintenance     Reminded patient about his influenza vaccination. Ordered placed in last encounter.            Plan:         XR Lumbar and thoracic spine  Referral to ortho    Education provided  Lifestyle recommendations given  AVS printed, explained, and given to the patient.  RTC in : 4 months, appointment scheduled for annual wellness visit      OLGA PHILLIPS MD, MPH  Internal Medicine  [facility]  [facility]

## 2022-10-11 NOTE — ASSESSMENT & PLAN NOTE
Acute on chronic pain.  Latest L xr from 2018: Results: AP, lateral, spot L5-S1 views. Mild dextroscoliosis of the lumbar spine.  The vertebral body heights are well-maintained,moderate disc space narrowing L4-L5 and L5-S1.  No osseous lesions or fracture identified. Atherosclerotic plaque of the aorta .  Will repeat XR and will refer to ortho  Continue Tylenol for pain.

## 2022-10-17 ENCOUNTER — TELEPHONE (OUTPATIENT)
Dept: SPINE | Facility: CLINIC | Age: 72
End: 2022-10-17
Payer: MEDICARE

## 2022-10-17 NOTE — TELEPHONE ENCOUNTER
This message is for patient in regards to his or hers appointment 10/18/22 at 1:00p with Kristan Azul Np.    On the 4th floor [address] in the [facility]. We do ask that patients arrive 15 minutes before appointment time.  Patient may contact [phone] if there is any questions or concerns. Thank you for entrusting in [facility] with your care.

## 2023-01-03 ENCOUNTER — PATIENT MESSAGE (OUTPATIENT)
Dept: INTERNAL MEDICINE | Facility: CLINIC | Age: 73
End: 2023-01-03
Payer: MEDICARE

## 2023-01-16 PROBLEM — Z00.00 HEALTHCARE MAINTENANCE: Status: RESOLVED | Noted: 2022-10-11 | Resolved: 2023-01-16

## 2023-01-18 ENCOUNTER — HOSPITAL ENCOUNTER (OUTPATIENT)
Dept: RADIOLOGY | Facility: HOSPITAL | Age: 73
Discharge: HOME | End: 2023-01-18
Attending: STUDENT IN AN ORGANIZED HEALTH CARE EDUCATION/TRAINING PROGRAM
Payer: MEDICARE

## 2023-01-18 DIAGNOSIS — M54.50 CHRONIC MIDLINE LOW BACK PAIN WITHOUT SCIATICA: ICD-10-CM

## 2023-01-18 DIAGNOSIS — G89.29 CHRONIC MIDLINE LOW BACK PAIN WITHOUT SCIATICA: ICD-10-CM

## 2023-01-18 DIAGNOSIS — G89.29 CHRONIC MIDLINE THORACIC BACK PAIN: ICD-10-CM

## 2023-01-18 DIAGNOSIS — M54.6 CHRONIC MIDLINE THORACIC BACK PAIN: ICD-10-CM

## 2023-01-18 PROCEDURE — 72074 X-RAY EXAM THORAC SPINE4/>VW: CPT | Mod: 26,,, | Performed by: RADIOLOGY

## 2023-01-18 PROCEDURE — 72074 X-RAY EXAM THORAC SPINE4/>VW: CPT | Mod: TC,FY,PO

## 2023-01-18 PROCEDURE — 72074 XR THORACIC SPINE 4 OR MORE VIEWS: ICD-10-PCS | Mod: 26,,, | Performed by: RADIOLOGY

## 2023-01-23 NOTE — PROGRESS NOTES
DATE: 1/26/2023  PATIENT: Nasima Pacheco    Supervising Physician: Lawrence Salmeron M.D.    CHIEF COMPLAINT: back pain    HISTORY:  Nasima Pacheco is a 72 y.o. female here for initial evaluation of low back pain (Back - 4). The pain has been present for years. The patient describes the pain as aching.  The pain is worse with walking and improved by rest. There is associated numbness and tingling. There is no subjective weakness. Prior treatments have included tylenol, but no PT, ATA or surgery.    The patient denies myelopathic symptoms such as handwriting changes or difficulty with buttons/coins/keys. Denies perineal paresthesias, bowel/bladder dysfunction.    PAST MEDICAL/SURGICAL HISTORY:  Past Medical History:   Diagnosis Date    Arthritis     Cataract     Hyperlipidemia     Hypertension      Past Surgical History:   Procedure Laterality Date    CATARACT EXTRACTION W/  INTRAOCULAR LENS IMPLANT Right 03/06/2017    Dr. Mendez    CATARACT EXTRACTION W/  INTRAOCULAR LENS IMPLANT Left 04/24/2017    Dr. Mendez    EYE SURGERY      HYSTERECTOMY      OOPHORECTOMY         Current Medications:   Current Outpatient Medications:     amlodipine-benazepril 10-20mg (LOTREL) 10-20 mg per capsule, Take 1 capsule by mouth once daily., Disp: 90 capsule, Rfl: 3    atorvastatin (LIPITOR) 10 MG tablet, Take 1 tablet (10 mg total) by mouth once daily., Disp: 90 tablet, Rfl: 3    calcium-vitamin D3 (OS-MOLLY 500 + D3) 500 mg-5 mcg (200 unit) per tablet, Take 1 tablet by mouth 2 (two) times daily with meals., Disp: 180 tablet, Rfl: 3    flu vac 2022 [id number],PF, (FLUAD QUAD 2022-23,65Y UP,,PF,) 60 mcg (15 mcg x 4)/0.5 mL Syrg, Inject into the muscle., Disp: 0.5 mL, Rfl: 0    hydroCHLOROthiazide (HYDRODIURIL) 12.5 MG Tab, Take 1 tablet (12.5 mg total) by mouth once daily., Disp: 90 tablet, Rfl: 3    meclizine (ANTIVERT) 12.5 mg tablet, Take 1 tablet (12.5 mg total) by mouth 3 (three) times daily as needed for Dizziness., Disp: 90  tablet, Rfl: 0    gabapentin (NEURONTIN) 100 MG capsule, Take 2 capsules (200 mg total) by mouth every evening., Disp: 60 capsule, Rfl: 11    tiZANidine (ZANAFLEX) 4 MG tablet, Take 1 tablet (4 mg total) by mouth every 8 (eight) hours as needed (muscle spasms)., Disp: 30 tablet, Rfl: 2    Social History:   Social History     Socioeconomic History    Marital status:    Tobacco Use    Smoking status: Never    Smokeless tobacco: Never   Substance and Sexual Activity    Alcohol use: No     Social Determinants of Health     Financial Resource Strain: Low Risk     Difficulty of Paying Living Expenses: Not hard at all   Food Insecurity: No Food Insecurity    Worried About Running Out of Food in the Last Year: Never true    Ran Out of Food in the Last Year: Never true   Transportation Needs: No Transportation Needs    Lack of Transportation (Medical): No    Lack of Transportation (Non-Medical): No   Physical Activity: Inactive    Days of Exercise per Week: 0 days    Minutes of Exercise per Session: 0 min   Stress: No Stress Concern Present    Feeling of Stress : Not at all   Social Connections: Socially Integrated    Frequency of Communication with Friends and Family: More than three times a week    Frequency of Social Gatherings with Friends and Family: Once a week    Attends [employer] Services: More than 4 times per year    Active Member of Clubs or Organizations: Yes    Attends Club or Organization Meetings: More than 4 times per year    Marital Status:    Housing Stability: Low Risk     Unable to Pay for Housing in the Last Year: No    Number of Places Lived in the Last Year: 1    Unstable Housing in the Last Year: No       REVIEW OF SYSTEMS:  Constitution: Negative. Negative for chills, fever and night sweats.   Cardiovascular: Negative for chest pain and syncope.   Respiratory: Negative for cough and shortness of breath.   Gastrointestinal: See HPI. Negative for nausea/vomiting. Negative for abdominal  pain.  Genitourinary: See HPI. Negative for discoloration or dysuria.  Skin: Negative for dry skin, itching and rash.   Hematologic/Lymphatic: Negative for bleeding problem. Does not bruise/bleed easily.   Musculoskeletal: Negative for falls and muscle weakness.   Neurological: See HPI. No seizures.   Endocrine: Negative for polydipsia, polyphagia and polyuria.   Allergic/Immunologic: Negative for hives and persistent infections.    PHYSICAL EXAMINATION:    Ht 5' (1.524 m)   Wt 60.6 kg (133 lb 9.6 oz)   BMI 26.09 kg/m²     General: The patient is a very pleasant 72 y.o. female in no apparent distress, the patient is oriented to person, place and time.   Psych: Normal mood and affect  HEENT: Vision grossly intact, hearing intact to the spoken word.  Lungs: Respirations unlabored.  Gait: Normal station and gait, no difficulty with toe or heel walk.   Skin: Dorsal lumbar skin negative for rashes, lesions, hairy patches and surgical scars.  Range of motion: Lumbar range of motion is acceptable. There is mild lumbar tenderness to palpation.  Spinal Balance: Global saggital and coronal spinal balance acceptable, no significant for scoliosis and kyphosis.  Musculoskeletal: No pain with the range of motion of the bilateral hips. No trochanteric tenderness to palpation.  Vascular: Bilateral lower extremities warm and well perfused, Dorsalis pedis pulses 2+ bilaterally.  Neurological: Normal strength and tone in all major motor groups in the bilateral lower extremities. Normal sensation to light touch in the L2-S1 dermatomes bilaterally.  Deep tendon reflexes symmetric 2+ in the bilateral lower extremities.  Negative Babinski bilaterally.  Straight leg raise negative bilaterally.     IMAGING:   Today I personally reviewed AP, Lat and Flex/Ex upright L-spine films that demonstrate moderate DDD.      Body mass index is 26.09 kg/m².    Hemoglobin A1C   Date Value Ref Range Status   09/13/2022 5.7 (H) 4.0 - 5.6 % Final      Comment:     ADA Screening Guidelines:  5.7-6.4%  Consistent with prediabetes  >or=6.5%  Consistent with diabetes    High levels of fetal hemoglobin interfere with the HbA1C  assay. Heterozygous hemoglobin variants (HbS, HgC, etc)do  not significantly interfere with this assay.   However, presence of multiple variants may affect accuracy.     03/29/2022 5.7 (H) 4.0 - 5.6 % Final     Comment:     ADA Screening Guidelines:  5.7-6.4%  Consistent with prediabetes  >or=6.5%  Consistent with diabetes    High levels of fetal hemoglobin interfere with the HbA1C  assay. Heterozygous hemoglobin variants (HbS, HgC, etc)do  not significantly interfere with this assay.   However, presence of multiple variants may affect accuracy.     03/26/2021 5.7 (H) 4.0 - 5.6 % Final     Comment:     ADA Screening Guidelines:  5.7-6.4%  Consistent with prediabetes  >or=6.5%  Consistent with diabetes    High levels of fetal hemoglobin interfere with the HbA1C  assay. Heterozygous hemoglobin variants (HbS, HgC, etc)do  not significantly interfere with this assay.   However, presence of multiple variants may affect accuracy.           ASSESSMENT/PLAN:    Diagnoses and all orders for this visit:    Chronic bilateral low back pain without sciatica  -     Ambulatory referral/consult to Orthopedics  -     gabapentin (NEURONTIN) 100 MG capsule; Take 2 capsules (200 mg total) by mouth every evening.  -     tiZANidine (ZANAFLEX) 4 MG tablet; Take 1 tablet (4 mg total) by mouth every 8 (eight) hours as needed (muscle spasms).  -     Ambulatory referral/consult to Physical/Occupational Therapy; Future    DDD (degenerative disc disease), lumbar  -     Ambulatory referral/consult to Orthopedics  -     gabapentin (NEURONTIN) 100 MG capsule; Take 2 capsules (200 mg total) by mouth every evening.  -     tiZANidine (ZANAFLEX) 4 MG tablet; Take 1 tablet (4 mg total) by mouth every 8 (eight) hours as needed (muscle spasms).  -     Ambulatory referral/consult to  Physical/Occupational Therapy; Future      Today we discussed at length all of the different treatment options including anti-inflammatories, acetaminophen, rest, ice, heat, physical therapy including strengthening and stretching exercises, home exercises, ROM, aerobic conditioning, aqua therapy, other modalities including ultrasound, massage, and dry needling, epidural steroid injections and finally surgical intervention.    Medications sent to the pharmacy. PT orders placed. The patient may follow up as needed. I will likely order a MRI at that time.

## 2023-01-26 ENCOUNTER — OFFICE VISIT (OUTPATIENT)
Dept: ORTHOPEDICS | Facility: CLINIC | Age: 73
End: 2023-01-26
Payer: MEDICARE

## 2023-01-26 VITALS — BODY MASS INDEX: 26.23 KG/M2 | WEIGHT: 133.63 LBS | HEIGHT: 60 IN

## 2023-01-26 DIAGNOSIS — M54.50 CHRONIC BILATERAL LOW BACK PAIN WITHOUT SCIATICA: ICD-10-CM

## 2023-01-26 DIAGNOSIS — M51.36 DDD (DEGENERATIVE DISC DISEASE), LUMBAR: ICD-10-CM

## 2023-01-26 DIAGNOSIS — G89.29 CHRONIC BILATERAL LOW BACK PAIN WITHOUT SCIATICA: ICD-10-CM

## 2023-01-26 PROCEDURE — 3008F BODY MASS INDEX DOCD: CPT | Mod: CPTII,S$GLB,, | Performed by: REGISTERED NURSE

## 2023-01-26 PROCEDURE — 1159F MED LIST DOCD IN RCRD: CPT | Mod: CPTII,S$GLB,, | Performed by: REGISTERED NURSE

## 2023-01-26 PROCEDURE — 1159F PR MEDICATION LIST DOCUMENTED IN MEDICAL RECORD: ICD-10-PCS | Mod: CPTII,S$GLB,, | Performed by: REGISTERED NURSE

## 2023-01-26 PROCEDURE — 1126F AMNT PAIN NOTED NONE PRSNT: CPT | Mod: CPTII,S$GLB,, | Performed by: REGISTERED NURSE

## 2023-01-26 PROCEDURE — 99204 OFFICE O/P NEW MOD 45 MIN: CPT | Mod: S$GLB,,, | Performed by: REGISTERED NURSE

## 2023-01-26 PROCEDURE — 3008F PR BODY MASS INDEX (BMI) DOCUMENTED: ICD-10-PCS | Mod: CPTII,S$GLB,, | Performed by: REGISTERED NURSE

## 2023-01-26 PROCEDURE — 1126F PR PAIN SEVERITY QUANTIFIED, NO PAIN PRESENT: ICD-10-PCS | Mod: CPTII,S$GLB,, | Performed by: REGISTERED NURSE

## 2023-01-26 PROCEDURE — 99999 PR PBB SHADOW E&M-EST. PATIENT-LVL III: CPT | Mod: PBBFAC,,, | Performed by: REGISTERED NURSE

## 2023-01-26 PROCEDURE — 99999 PR PBB SHADOW E&M-EST. PATIENT-LVL III: ICD-10-PCS | Mod: PBBFAC,,, | Performed by: REGISTERED NURSE

## 2023-01-26 PROCEDURE — 99204 PR OFFICE/OUTPT VISIT, NEW, LEVL IV, 45-59 MIN: ICD-10-PCS | Mod: S$GLB,,, | Performed by: REGISTERED NURSE

## 2023-01-26 RX ORDER — GABAPENTIN 100 MG/1
200 CAPSULE ORAL NIGHTLY
Qty: 60 CAPSULE | Refills: 11 | Status: SHIPPED | OUTPATIENT
Start: 2023-01-26 | End: 2023-10-30

## 2023-01-26 RX ORDER — TIZANIDINE 4 MG/1
4 TABLET ORAL EVERY 8 HOURS PRN
Qty: 30 TABLET | Refills: 2 | Status: SHIPPED | OUTPATIENT
Start: 2023-01-26 | End: 2023-03-13

## 2023-01-31 ENCOUNTER — PATIENT MESSAGE (OUTPATIENT)
Dept: INTERNAL MEDICINE | Facility: CLINIC | Age: 73
End: 2023-01-31
Payer: MEDICARE

## 2023-02-16 ENCOUNTER — CLINICAL SUPPORT (OUTPATIENT)
Dept: REHABILITATION | Facility: HOSPITAL | Age: 73
End: 2023-02-16
Payer: MEDICARE

## 2023-02-16 DIAGNOSIS — M53.86 DECREASED RANGE OF MOTION OF LUMBAR SPINE: ICD-10-CM

## 2023-02-16 DIAGNOSIS — R53.1 DECREASED STRENGTH, ENDURANCE, AND MOBILITY: ICD-10-CM

## 2023-02-16 DIAGNOSIS — R68.89 DECREASED STRENGTH, ENDURANCE, AND MOBILITY: ICD-10-CM

## 2023-02-16 DIAGNOSIS — Z74.09 DECREASED STRENGTH, ENDURANCE, AND MOBILITY: ICD-10-CM

## 2023-02-16 DIAGNOSIS — M54.50 CHRONIC BILATERAL LOW BACK PAIN WITHOUT SCIATICA: ICD-10-CM

## 2023-02-16 DIAGNOSIS — G89.29 CHRONIC BILATERAL LOW BACK PAIN WITHOUT SCIATICA: ICD-10-CM

## 2023-02-16 DIAGNOSIS — M51.36 DDD (DEGENERATIVE DISC DISEASE), LUMBAR: ICD-10-CM

## 2023-02-16 PROCEDURE — 97530 THERAPEUTIC ACTIVITIES: CPT | Mod: PN

## 2023-02-16 PROCEDURE — 97162 PT EVAL MOD COMPLEX 30 MIN: CPT | Mod: PN

## 2023-02-16 NOTE — PLAN OF CARE
[facility]   Physical Therapy Initial Evaluation     Date: 2/16/2023   Name: Nasima Pacheco  Clinic Number: [id number]    Therapy Diagnosis:   Encounter Diagnoses   Name Primary?    Chronic bilateral low back pain without sciatica     DDD (degenerative disc disease), lumbar     Decreased range of motion of lumbar spine     Decreased strength, endurance, and mobility      Physician: CHAD Luong, NP    Physician Orders: PT Eval and Treat   Medical Diagnosis from Referral:   M54.50,G89.29 (ICD-10-CM) - Chronic bilateral low back pain without sciatica   M51.36 (ICD-10-CM) - DDD (degenerative disc disease), lumbar     Evaluation Date: 2/16/2023  Authorization Period Expiration: 2/16/2024  Plan of Care Expiration: 4/13/2023  Progress Note Due: 3/16/2023  Visit # / Visits authorized: 1/20   FOTO: 1/5    Precautions: Standard     Time In: 1:08 pm   Time Out: 2:04 pm  Total Appointment Time (timed & untimed codes): 56 minutes      SUBJECTIVE     Date of onset: ~10 years ago    History of current condition - Ines reports chronic low back pain for the past ten years. No JONAH reported. Reports intermittant radiating pain into left anterior thigh that she describes as her leg falling to sleep. Left lower extremity pain wakes her up at night. Patient denies incontinence, weakness or changes in gait, saddle/perineal paresthesia, pain unchanged with rest, or unexplainable weight loss. Patient is very sedentary most of the day. Reports increased pain with sitting (15-30 minutes), standing (20 minutes), and walking. She notices she slouches while sitting.     Falls: 0    Imaging, XRAY: Mild disc space narrowing at L4-L5 and L5-S1. Moderate facet arthropathy most prominent at L4-L5 and L5-S1.    Prior Therapy: none   Social History:  lives with their spouse  Occupation: retired   Prior Level of Function: independent   Current Level of Function: independent but has pain with ADLs    Pain:  Current 5/10,  worst 9/10, best 5/10   Location: left lower extremity and midline lumbosacral   Description: Aching, Sharp, and Shooting  Aggravating Factors: Sitting, Standing, Walking, and Getting out of bed/chair  Easing Factors: rest    Patients goals: return to walking 1 mile pain-free      Medical History:   Past Medical History:   Diagnosis Date    Arthritis     Cataract     Hyperlipidemia     Hypertension        Surgical History:   Nasima Pacheco  has a past surgical history that includes Hysterectomy; Eye surgery; Cataract extraction w/  intraocular lens implant (Right, 03/06/2017); Cataract extraction w/  intraocular lens implant (Left, 04/24/2017); and Oophorectomy.    Medications:   Nasima has a current medication list which includes the following prescription(s): amlodipine-benazepril 10-20mg, atorvastatin, calcium-vitamin d3, fluad quad 2022-23(65y up)(pf), gabapentin, hydrochlorothiazide, meclizine, and tizanidine.    Allergies:   Review of patient's allergies indicates:  No Known Allergies       OBJECTIVE     Posture: increased kyphosis and lumbar lordosis with anterior pelvic tilt     Palpation: (+) L piriformis and ITB     Gross Deep Tendon Reflexes:    Right  Left   Patellar (L3-L4): 2+ 2+   Achilles (S1):  2+ 2+     Functional Movements:  Gait Analysis: decreased hip and knee extension in stance   Sit <> stand: definite use of hands   Bed mobility: guarded     AROM:   Degrees Pain/Dysfunction   Flexion WNL NP  Donavan's Sign: -   Extension WNL  NP   Right Side Bending  bere thigh  NP   Left Side Bending  mid thigh  NP   Right Rotation 25% NP   Left Rotation 25% NP     Hip AROM:   RLE LLE   Internal Rotation 32 31   External Rotation 23 20   Flexion WNL WNL       Strength:  RLE  LLE    Hip flexion: 4-/5 Hip flexion: 4-/5   Hip Abduction: 5/5 Hip abduction: 5/5   Hip extension 5/5 Hip extension 5/5   Knee flexion: 4-/5 Knee flexion: 4-/5   Knee extension: 4-/5 Knee extension: 4-/5   Ankle Dorsiflexion: 4-/5  "Ankle Dorsiflexion: 4-/5   Ankle Plantarflexion: 4-/5 Ankle Plantarflexion: 4-/5     Special tests:   SLR: + L (vague description); - R   Slump: - B    Joint mobility:   Thoracic: hypomobile  Lumbar: L5-S1 CPA painful and hypomobile     Flexibility: tightness of hip flexors, hamstrings, and gastroc-soleus     Other:  Timed Up and Go:  12.69 seconds    30" Chair Stand: 9x without upper extremity support    Timed Up and Go Cutoff Scores:        30" Chair Stand Cutoff Scores:      Limitation/Restriction for FOTO Lumbar Survey    Therapist reviewed FOTO scores for Nasima Pacheco on 2/16/2023.   FOTO documents entered into [employer] - see Media section.    Limitation Score: 35%         TREATMENT     Total Treatment time (time-based codes) separate from Evaluation: 15 minutes      Ines received the treatments listed below:      therapeutic activities to improve functional performance for 15  minutes, including:  Seated sciatic nerve glides: 10x  Seated hamstring stretch with towel: 2x30"  Seated thoracic extensions: 10x   Figure four piriformis stretch: 2x30"  Rows: RTB; 2 x10      PATIENT EDUCATION AND HOME EXERCISES     Education provided:   - Prognosis  - Plan of Care  - Pain Science    Written Home Exercises Provided: yes. Exercises were reviewed and Ines was able to demonstrate them prior to the end of the session.  Ines demonstrated fair  understanding of the education provided. See EMR under Patient Instructions for exercises provided during therapy sessions.    ASSESSMENT     Nasima is a 72 y.o. female referred to outpatient Physical Therapy with a medical diagnosis of M54.50,G89.29 (ICD-10-CM) - Chronic bilateral low back pain without sciatica and M51.36 (ICD-10-CM) - DDD (degenerative disc disease), lumbar. Patient presents with signs and symptoms consistent with low back pain with mobility deficits. Potential sciatic nerve involvement but testing inconclusive secondary to language barrier. Patient lives " sedentary lifestyle. Deficits include decreased lumbar AROM, lack of hip external rotation bilaterally, lower extremity flexibility limitations, mechanical gait deficits, and poor functional mobility. Patient would benefit from reducing neural tension and implementation of thoracic/lumbar and hip mobility program followed by posterior chain stabilization.    Patient prognosis is Good.   Patient will benefit from skilled outpatient Physical Therapy to address the deficits stated above and in the chart below, provide patient /family education, and to maximize patientt's level of independence.     Plan of care discussed with patient: Yes  Patient's spiritual, cultural and educational needs considered and patient is agreeable to the plan of care and goals as stated below:     Anticipated Barriers for therapy: language barrier; poor historian    Medical Necessity is demonstrated by the following  History  Co-morbidities and personal factors that may impact the plan of care Co-morbidities:   advanced age, high BMI, and HTN    Personal Factors:   age  coping style  lifestyle  character  attitudes     moderate   Examination  Body Structures and Functions, activity limitations and participation restrictions that may impact the plan of care Body Regions:   back  lower extremities  upper extremities  trunk    Body Systems:    gross symmetry  ROM  strength  gait  transitions  motor control    Participation Restrictions:   none    Activity limitations:   Learning and applying knowledge  no deficits    General Tasks and Commands  no deficits    Communication  no deficits    Mobility  lifting and carrying objects  walking  driving (bike, car, motorcycle)    Self care  no deficits    Domestic Life  shopping  cooking  doing house work (cleaning house, washing dishes, laundry)    Interactions/Relationships  basic interpersonal interactions  complex interpersonal interactions    Life Areas  no deficits    Community and Social  Life  community life  recreation and leisure         moderate   Clinical Presentation evolving clinical presentation with changing clinical characteristics moderate   Decision Making/ Complexity Score: moderate     Goals:  Short Term Goals (4 Weeks):  1. Patient will be compliant with home exercise program to supplement therapy in promoting functional mobility.  2. Patient will perform transverse abdominus contraction with good control to demonstrate improved core strength.  3. Patient will report no pain during thoracolumbar active range of motion to promote functional mobility.  4. Patient will improve impaired lower extremity myotomes/manual muscle tests  to >/=4/5 to improve strength for functional tasks.    Long Term Goals (8 Weeks):   1. Patient will improve FOTO score to </= 29% limited to decrease perceived limitation with maintaining/changing body position.   2. Patient will report pain at rest to </= 1/10 to improve quality of life.  3. Patient will improve impaired lower extremity myotomes/manual muscle tests to >/=4+/5 to improve strength for functional tasks.  4. Patient will improve TUG score to </=10.5 seconds to decrease falls risk in community.  5. Patient will improve 30 second sit to stand score to >/= to 14 repetitions to increase lower extremity strength for functional transitional activities.        PLAN   Plan of care Certification: 2/16/2023 to 4/13/2023.    Outpatient Physical Therapy 2 times weekly for 8 weeks to include the following interventions: Gait Training, Manual Therapy, Moist Heat/ Ice, Neuromuscular Re-ed, Patient Education, Therapeutic Activities, and Therapeutic Exercise.     Elizabeth Aparicio, PT, DPT      I CERTIFY THE NEED FOR THESE SERVICES FURNISHED UNDER THIS PLAN OF TREATMENT AND WHILE UNDER MY CARE   Physician's comments:     Physician's Signature: ___________________________________________________

## 2023-02-23 ENCOUNTER — CLINICAL SUPPORT (OUTPATIENT)
Dept: REHABILITATION | Facility: HOSPITAL | Age: 73
End: 2023-02-23
Payer: MEDICARE

## 2023-02-23 DIAGNOSIS — R53.1 DECREASED STRENGTH, ENDURANCE, AND MOBILITY: ICD-10-CM

## 2023-02-23 DIAGNOSIS — M53.86 DECREASED RANGE OF MOTION OF LUMBAR SPINE: Primary | ICD-10-CM

## 2023-02-23 DIAGNOSIS — Z74.09 DECREASED STRENGTH, ENDURANCE, AND MOBILITY: ICD-10-CM

## 2023-02-23 DIAGNOSIS — R68.89 DECREASED STRENGTH, ENDURANCE, AND MOBILITY: ICD-10-CM

## 2023-02-23 PROCEDURE — 97110 THERAPEUTIC EXERCISES: CPT | Mod: PN,CQ

## 2023-02-23 NOTE — PROGRESS NOTES
"[facility]   Physical Therapy Treatment Note     Name: Nasima Pacheco  [facility] Number: [id number]    Therapy Diagnosis:   Encounter Diagnoses   Name Primary?    Decreased range of motion of lumbar spine Yes    Decreased strength, endurance, and mobility        Physician: CHAD Luong NP    Visit Date: 2/23/2023    Encounter Diagnoses   Name Primary?    Chronic bilateral low back pain without sciatica      DDD (degenerative disc disease), lumbar      Decreased range of motion of lumbar spine      Decreased strength, endurance, and mobility        Physician: CHAD Luong NP     Physician Orders: PT Eval and Treat   Medical Diagnosis from Referral:   M54.50,G89.29 (ICD-10-CM) - Chronic bilateral low back pain without sciatica   M51.36 (ICD-10-CM) - DDD (degenerative disc disease), lumbar      Evaluation Date: 2/16/2023  Authorization Period Expiration: 2/16/2024  Plan of Care Expiration: 4/13/2023  Progress Note Due: 3/16/2023  Visit # / Visits authorized: 2/20   FOTO: 2/5     Precautions: Standard      PTA Visit #: 1/5     Time In: 2:00 PM  Time Out: 2:55 PM   Total Billable Time: 55 minutes    SUBJECTIVE     Pt reports: No pain currently. She did not do her home exercise program because she was busy with guests in for vivio.  She was not compliant with home exercise program.  Response to previous treatment: No adverse effects  Functional change: Ongoing    Pain: 0/10  Location: low, mid and upper back    OBJECTIVE     Objective Measures updated at progress report unless specified.     Benefits from Yakut interpretation (Cherrie used today)  Treatment     Ines received the treatments listed below:      therapeutic exercises to develop strength, endurance, ROM, flexibility, posture, and core stabilization for 55 minutes including:  Transverse abdominus 15x5" holds   Bridges 2x10 with 3 sec holds (add TB next)  Clamshells in side lying 2x10 bilateral  (Add TB next)  Side lying " "open book with head turn x10 bilateral   Seated sciatic nerve glides: 10x  Seated hamstring stretch with towel: 2x30"  Seated thoracic extensions in chair with smaller maroon bolster: 20x   Figure four piriformis stretch: 2x30"  Sit to stands from chair 2x10 reps with No upper extremity support   Rows: RTB; 2 x10 NP out of time     manual therapy techniques: Joint mobilizations, Manual traction, Myofacial release, Soft tissue Mobilization, and Friction Massage were applied to the: lumbar spine  for 0 minutes, including:  NP     neuromuscular re-education activities to improve: Balance, Coordination, Kinesthetic, Sense, Proprioception, and Posture for 0 minutes. The following activities were included:  NP    therapeutic activities to improve functional performance for   minutes, including:  NP            Patient Education and Home Exercises     Home Exercises Provided and Patient Education Provided     Education provided:   - Necessity of home exercise program compliance    Written Home Exercises Provided: Patient instructed to cont prior HEP. Exercises were reviewed and Ines was able to demonstrate them prior to the end of the session.  Iens demonstrated good  understanding of the education provided. See EMR under Patient Instructions for exercises provided during therapy sessions    ASSESSMENT     Ines tolerated her first follow up very well. She benefits from Persian interpretation. She stated she was somewhat nervous on her first time here but more comfortable now. She was not compliant with her home exercise program. But she is looking fwd to exercising. She hasn't had much pain lately as she has been busy.       Ines Is progressing well towards her goals.   Pt prognosis is Good.     Pt will continue to benefit from skilled outpatient physical therapy to address the deficits listed in the problem list box on initial evaluation, provide pt/family education and to maximize pt's level of independence in the home " and community environment.     Pt's spiritual, cultural and educational needs considered and pt agreeable to plan of care and goals.     Anticipated barriers to physical therapy: language barrier; poor historian    Goals:   Short Term Goals (4 Weeks):  1. Patient will be compliant with home exercise program to supplement therapy in promoting functional mobility.  2. Patient will perform transverse abdominus contraction with good control to demonstrate improved core strength.  3. Patient will report no pain during thoracolumbar active range of motion to promote functional mobility.  4. Patient will improve impaired lower extremity myotomes/manual muscle tests  to >/=4/5 to improve strength for functional tasks.     Long Term Goals (8 Weeks):   1. Patient will improve FOTO score to </= 29% limited to decrease perceived limitation with maintaining/changing body position.   2. Patient will report pain at rest to </= 1/10 to improve quality of life.  3. Patient will improve impaired lower extremity myotomes/manual muscle tests to >/=4+/5 to improve strength for functional tasks.  4. Patient will improve TUG score to </=10.5 seconds to decrease falls risk in community.  5. Patient will improve 30 second sit to stand score to >/= to 14 repetitions to increase lower extremity strength for functional transitional activities.     PLAN     Continue PT plan of care     Tejinder Tafoya, COURT

## 2023-02-24 ENCOUNTER — CLINICAL SUPPORT (OUTPATIENT)
Dept: REHABILITATION | Facility: HOSPITAL | Age: 73
End: 2023-02-24
Payer: MEDICARE

## 2023-02-24 DIAGNOSIS — R53.1 DECREASED STRENGTH, ENDURANCE, AND MOBILITY: ICD-10-CM

## 2023-02-24 DIAGNOSIS — R68.89 DECREASED STRENGTH, ENDURANCE, AND MOBILITY: ICD-10-CM

## 2023-02-24 DIAGNOSIS — Z74.09 DECREASED STRENGTH, ENDURANCE, AND MOBILITY: ICD-10-CM

## 2023-02-24 DIAGNOSIS — M53.86 DECREASED RANGE OF MOTION OF LUMBAR SPINE: Primary | ICD-10-CM

## 2023-02-24 PROCEDURE — 97110 THERAPEUTIC EXERCISES: CPT | Mod: PN

## 2023-02-24 PROCEDURE — 97530 THERAPEUTIC ACTIVITIES: CPT | Mod: PN

## 2023-02-24 NOTE — PROGRESS NOTES
"[facility]   Physical Therapy Treatment Note     Name: Nasima Pacheco  [facility] Number: [id number]    Therapy Diagnosis:   Encounter Diagnoses   Name Primary?    Decreased range of motion of lumbar spine Yes    Decreased strength, endurance, and mobility        Physician: CHAD Luong NP    Visit Date: 2/24/2023    Encounter Diagnoses   Name Primary?    Chronic bilateral low back pain without sciatica      DDD (degenerative disc disease), lumbar      Decreased range of motion of lumbar spine      Decreased strength, endurance, and mobility        Physician: CHAD Luong NP     Physician Orders: PT Eval and Treat   Medical Diagnosis from Referral:   M54.50,G89.29 (ICD-10-CM) - Chronic bilateral low back pain without sciatica   M51.36 (ICD-10-CM) - DDD (degenerative disc disease), lumbar      Evaluation Date: 2/16/2023  Authorization Period Expiration: 2/16/2024  Plan of Care Expiration: 4/13/2023  Progress Note Due: 3/16/2023  Visit # / Visits authorized: 1/20 (+ 3)   FOTO: 2/5     Precautions: Standard      PTA Visit #: 1/5     Time In: 1:04 PM  Time Out: 2:55 PM   Total Billable Time: 55 minutes    SUBJECTIVE     Pt reports: she woke up with bilateral lower back pain today. She put some roll on cream which helped.     She was not compliant with home exercise program.  Response to previous treatment: decreased pain; soreness and fatigue  Functional change: Ongoing    Pain: 5/10; 1-2/10 end of session  Location: low back    OBJECTIVE     Objective Measures updated at progress report unless specified.     Benefits from Persian interpretation (Cherrie used today)  Treatment     Ines received the treatments listed below:      therapeutic exercises to develop strength, endurance, ROM, flexibility, posture, and core stabilization for 47 minutes including:  Seated hamstring stretch with strap: 2x30" bilateral  Bridges 2x10 with 3 sec holds - green theraband  LTRs: 20x  SKTC: 3x10"  Seated " "sciatic nerve glides: 20x  Standing hip abduction: 2x10 with red theraband with cone for visual cue  Standing hip extension: 2x10 with red thereaband   Seated thoracic extensions in chair with smaller maroon bolster: 15x  Swiss ball roll outs: 20x  Long arc quads: 2# ankle weight -  2x10 bilateral    therapeutic activities to improve functional performance for 8  minutes, including:  Sit to stands: 2 kg green ball - 20x with red theraband above the knees to avoid valgus collapse  Step ups: 6" blue step- 20x       Patient Education and Home Exercises     Home Exercises Provided and Patient Education Provided     Education provided:   - Necessity of home exercise program compliance    Written Home Exercises Provided: Patient instructed to cont prior HEP. Exercises were reviewed and Ines was able to demonstrate them prior to the end of the session.  Ines demonstrated good  understanding of the education provided. See EMR under Patient Instructions for exercises provided during therapy sessions    ASSESSMENT   Nasima is a 72 y.o. female referred to outpatient Physical Therapy with a medical diagnosis of M54.50,G89.29 (ICD-10-CM) - Chronic bilateral low back pain without sciatica and M51.36 (ICD-10-CM) - DDD (degenerative disc disease), lumbar. Patient presents with signs and symptoms consistent with low back pain with mobility deficits. Presents with  for interpretation. Presents with increased irritability and low back pain. Focused on generalized functional strengthening particularly gluteus complex activation with no complaints. Difficulty with bed mobility; therefore, progressed to seated and standing therex. Increased quadriceps weakness on right versus left. Encouraged patient to continuing moving outside of therapy - demonstrated good understanding.    Ines Is progressing well towards her goals.   Pt prognosis is Good.     Pt will continue to benefit from skilled outpatient physical therapy to address " the deficits listed in the problem list box on initial evaluation, provide pt/family education and to maximize pt's level of independence in the home and community environment.     Pt's spiritual, cultural and educational needs considered and pt agreeable to plan of care and goals.     Anticipated barriers to physical therapy: language barrier; poor historian    Goals:   Short Term Goals (4 Weeks):  1. Patient will be compliant with home exercise program to supplement therapy in promoting functional mobility.  2. Patient will perform transverse abdominus contraction with good control to demonstrate improved core strength.  3. Patient will report no pain during thoracolumbar active range of motion to promote functional mobility.  4. Patient will improve impaired lower extremity myotomes/manual muscle tests  to >/=4/5 to improve strength for functional tasks.     Long Term Goals (8 Weeks):   1. Patient will improve FOTO score to </= 29% limited to decrease perceived limitation with maintaining/changing body position.   2. Patient will report pain at rest to </= 1/10 to improve quality of life.  3. Patient will improve impaired lower extremity myotomes/manual muscle tests to >/=4+/5 to improve strength for functional tasks.  4. Patient will improve TUG score to </=10.5 seconds to decrease falls risk in community.  5. Patient will improve 30 second sit to stand score to >/= to 14 repetitions to increase lower extremity strength for functional transitional activities.     PLAN   Lumbar AROM  Gross lower extremity strengthening  Endurance training  Functional activity training    Elizabeth Aparicio, PT

## 2023-02-28 ENCOUNTER — CLINICAL SUPPORT (OUTPATIENT)
Dept: REHABILITATION | Facility: HOSPITAL | Age: 73
End: 2023-02-28
Payer: MEDICARE

## 2023-02-28 DIAGNOSIS — Z74.09 DECREASED STRENGTH, ENDURANCE, AND MOBILITY: ICD-10-CM

## 2023-02-28 DIAGNOSIS — R68.89 DECREASED STRENGTH, ENDURANCE, AND MOBILITY: ICD-10-CM

## 2023-02-28 DIAGNOSIS — R53.1 DECREASED STRENGTH, ENDURANCE, AND MOBILITY: ICD-10-CM

## 2023-02-28 DIAGNOSIS — M53.86 DECREASED RANGE OF MOTION OF LUMBAR SPINE: Primary | ICD-10-CM

## 2023-02-28 PROCEDURE — 97110 THERAPEUTIC EXERCISES: CPT | Mod: PN

## 2023-02-28 PROCEDURE — 97140 MANUAL THERAPY 1/> REGIONS: CPT | Mod: PN

## 2023-02-28 PROCEDURE — 97530 THERAPEUTIC ACTIVITIES: CPT | Mod: PN

## 2023-02-28 NOTE — PROGRESS NOTES
[facility]   Physical Therapy Treatment Note     Name: Nasima Pacheco  [facility] Number: [id number]    Therapy Diagnosis:   Encounter Diagnoses   Name Primary?    Decreased range of motion of lumbar spine Yes    Decreased strength, endurance, and mobility        Physician: CHAD Luong NP    Visit Date: 2/28/2023    Encounter Diagnoses   Name Primary?    Chronic bilateral low back pain without sciatica      DDD (degenerative disc disease), lumbar      Decreased range of motion of lumbar spine      Decreased strength, endurance, and mobility        Physician: CHAD Luong NP     Physician Orders: PT Eval and Treat   Medical Diagnosis from Referral:   M54.50,G89.29 (ICD-10-CM) - Chronic bilateral low back pain without sciatica   M51.36 (ICD-10-CM) - DDD (degenerative disc disease), lumbar      Evaluation Date: 2/16/2023  Authorization Period Expiration: 2/16/2024  Plan of Care Expiration: 4/13/2023  Progress Note Due: 3/16/2023  Visit # / Visits authorized: 4/20 (+ 1)   FOTO: 2/5     Precautions: Standard      PTA Visit #: 1/5     Time In: 2:05 PM  Time Out: 3:00 PM   Total Billable Time: 55 minutes (1 TA) (1 MT) (2 TE)    SUBJECTIVE     Pt reports: her low back pain is very up and down. She reports non-compliance with home exercise program but she is going to try and be better.     She was not compliant with home exercise program.  Response to previous treatment: decreased pain; soreness and fatigue  Functional change: Ongoing    Pain: 5/10  Location: low back    OBJECTIVE     Objective Measures updated at progress report unless specified.     Benefits from Chinese interpretation  Treatment     Ines received the treatments listed below:      manual therapy techniques: Joint mobilizations and Soft tissue Mobilization were applied to the: left hip for 15 minutes, including:  Left lateral hip distraction - grade II-III  Left manual piriformis stretch     therapeutic exercises to  "develop strength, endurance, ROM, flexibility, posture, and core stabilization for 32 minutes including:  LTRs: 20x  Piriformis stretch: 3x30"  Bridges with hip abduction: green theraband - 3x10   Right sidelying clamshells: 3x10   Seated swiss ball rollouts: 15x   Standing hip abduction: 3x10 with red theraband   Standing hip extension: 3x10 with red thereaband       therapeutic activities to improve functional performance for 8 minutes, including:  Patient education on compliance with home exercise program and the plan to find 3 exercises that help reduce to pain to perform daily with her routine   Patient education on home exercise program and printout       Patient Education and Home Exercises     Home Exercises Provided and Patient Education Provided     Education provided:   - Necessity of home exercise program compliance    Written Home Exercises Provided: Patient instructed to cont prior HEP. Exercises were reviewed and Ines was able to demonstrate them prior to the end of the session.  Ines demonstrated good  understanding of the education provided. See EMR under Patient Instructions for exercises provided during therapy sessions    ASSESSMENT   Nasima is a 72 y.o. female referred to outpatient Physical Therapy with a medical diagnosis of M54.50,G89.29 (ICD-10-CM) - Chronic bilateral low back pain without sciatica and M51.36 (ICD-10-CM) - DDD (degenerative disc disease), lumbar. Patient presents with signs and symptoms consistent with low back pain with mobility deficits. In depth discussion with patient about home exercise program and provided printout of LTR, piriformis stretch, and hip abduction. Patient presented with 5/10 and reported 0/10 upon leaving. Planning to question in regards to compliance with home exercise program next visit.     Ines Is progressing well towards her goals.   Pt prognosis is Good.     Pt will continue to benefit from skilled outpatient physical therapy to address the " deficits listed in the problem list box on initial evaluation, provide pt/family education and to maximize pt's level of independence in the home and community environment.     Pt's spiritual, cultural and educational needs considered and pt agreeable to plan of care and goals.     Anticipated barriers to physical therapy: language barrier; poor historian    Goals:   Short Term Goals (4 Weeks):  1. Patient will be compliant with home exercise program to supplement therapy in promoting functional mobility.  2. Patient will perform transverse abdominus contraction with good control to demonstrate improved core strength.  3. Patient will report no pain during thoracolumbar active range of motion to promote functional mobility.  4. Patient will improve impaired lower extremity myotomes/manual muscle tests  to >/=4/5 to improve strength for functional tasks.     Long Term Goals (8 Weeks):   1. Patient will improve FOTO score to </= 29% limited to decrease perceived limitation with maintaining/changing body position.   2. Patient will report pain at rest to </= 1/10 to improve quality of life.  3. Patient will improve impaired lower extremity myotomes/manual muscle tests to >/=4+/5 to improve strength for functional tasks.  4. Patient will improve TUG score to </=10.5 seconds to decrease falls risk in community.  5. Patient will improve 30 second sit to stand score to >/= to 14 repetitions to increase lower extremity strength for functional transitional activities.     PLAN   Lumbar AROM  Gross lower extremity strengthening  Endurance training  Functional activity training    Valentin Salvador, PT

## 2023-03-03 ENCOUNTER — CLINICAL SUPPORT (OUTPATIENT)
Dept: REHABILITATION | Facility: HOSPITAL | Age: 73
End: 2023-03-03
Payer: MEDICARE

## 2023-03-03 DIAGNOSIS — Z74.09 DECREASED STRENGTH, ENDURANCE, AND MOBILITY: ICD-10-CM

## 2023-03-03 DIAGNOSIS — M53.86 DECREASED RANGE OF MOTION OF LUMBAR SPINE: Primary | ICD-10-CM

## 2023-03-03 DIAGNOSIS — R68.89 DECREASED STRENGTH, ENDURANCE, AND MOBILITY: ICD-10-CM

## 2023-03-03 DIAGNOSIS — R53.1 DECREASED STRENGTH, ENDURANCE, AND MOBILITY: ICD-10-CM

## 2023-03-03 PROCEDURE — 97110 THERAPEUTIC EXERCISES: CPT | Mod: PN

## 2023-03-03 PROCEDURE — 97140 MANUAL THERAPY 1/> REGIONS: CPT | Mod: PN

## 2023-03-03 NOTE — PROGRESS NOTES
[facility]   Physical Therapy Treatment Note     Name: Nasima Pacheco  [facility] Number: [id number]    Therapy Diagnosis:   Encounter Diagnoses   Name Primary?    Decreased range of motion of lumbar spine Yes    Decreased strength, endurance, and mobility        Physician: CHAD Luong NP    Visit Date: 3/3/2023    Encounter Diagnoses   Name Primary?    Chronic bilateral low back pain without sciatica      DDD (degenerative disc disease), lumbar      Decreased range of motion of lumbar spine      Decreased strength, endurance, and mobility        Physician: CHAD Luong NP     Physician Orders: PT Eval and Treat   Medical Diagnosis from Referral:   M54.50,G89.29 (ICD-10-CM) - Chronic bilateral low back pain without sciatica   M51.36 (ICD-10-CM) - DDD (degenerative disc disease), lumbar      Evaluation Date: 2/16/2023  Authorization Period Expiration: 2/16/2024  Plan of Care Expiration: 4/13/2023  Progress Note Due: 3/16/2023  Visit # / Visits authorized: 3/20 (+ 2)   FOTO: 2/5     Precautions: Standard      PTA Visit #: 1/5     Time In: 1:05 PM  Time Out: 2:00 PM   Total Billable Time: 55 minutes - 1:1 physical therapist (1 TE; 1 MT)      SUBJECTIVE     Pt reports: Continuing to wake up with some low back pain. Hasn't noticed much difference in her pain. She tried to do her home exercise program every other day.    She was not compliant with home exercise program.  Response to previous treatment: decreased pain; soreness and fatigue  Functional change: Ongoing    Pain: 5/10; 0/10 end of session  Location: low back    OBJECTIVE     Objective Measures updated at progress report unless specified.     Palpation: (+) bilateral PSIS and bilateral piriformis    Treatment     Ines received the treatments listed below:      manual therapy techniques: Joint mobilizations and Soft tissue Mobilization were applied to the: left hip for 15 minutes 1:1 physical therapist  , including:  Left  "lateral hip distraction - grade II-III  IASTM with percussion to bilateral gluteus complex  Left manual piriformis stretch - NT    therapeutic exercises to develop strength, endurance, ROM, flexibility, posture, and core stabilization for 15 minutes 1:1 physical therapist  including:  LTRs: 20x  Piriformis stretch: 3x30"  Bridges with hip abduction: green theraband - 3x10   Right sidelying clamshells: 3x10   Seated swiss ball rollouts: 15x   Standing hip abduction: 3x10 with red theraband   Standing hip extension: 3x10 with red thereaband     therapeutic activities to improve functional performance for 0 minutes, including:        Patient Education and Home Exercises     Home Exercises Provided and Patient Education Provided     Education provided:   - Necessity of home exercise program compliance    Written Home Exercises Provided: Patient instructed to cont prior HEP. Exercises were reviewed and Ines was able to demonstrate them prior to the end of the session.  Ines demonstrated good  understanding of the education provided. See EMR under Patient Instructions for exercises provided during therapy sessions    ASSESSMENT   Nasima is a 72 y.o. female referred to outpatient Physical Therapy with a medical diagnosis of M54.50,G89.29 (ICD-10-CM) - Chronic bilateral low back pain without sciatica and M51.36 (ICD-10-CM) - DDD (degenerative disc disease), lumbar. Patient presents with signs and symptoms consistent with low back pain with mobility deficits. Continued with same exercises except for addition of resistance with gluteus strengthening. Initiated IASTM to glute complex with good response and improved soft tissue mobility. Verbalized decreased pain to 0/10 at end of session from 5/10.     Ines Is progressing well towards her goals.   Pt prognosis is Good.     Pt will continue to benefit from skilled outpatient physical therapy to address the deficits listed in the problem list box on initial evaluation, provide " pt/family education and to maximize pt's level of independence in the home and community environment.     Pt's spiritual, cultural and educational needs considered and pt agreeable to plan of care and goals.     Anticipated barriers to physical therapy: language barrier; poor historian    Goals:   Short Term Goals (4 Weeks):  1. Patient will be compliant with home exercise program to supplement therapy in promoting functional mobility.  2. Patient will perform transverse abdominus contraction with good control to demonstrate improved core strength.  3. Patient will report no pain during thoracolumbar active range of motion to promote functional mobility.  4. Patient will improve impaired lower extremity myotomes/manual muscle tests  to >/=4/5 to improve strength for functional tasks.     Long Term Goals (8 Weeks):   1. Patient will improve FOTO score to </= 29% limited to decrease perceived limitation with maintaining/changing body position.   2. Patient will report pain at rest to </= 1/10 to improve quality of life.  3. Patient will improve impaired lower extremity myotomes/manual muscle tests to >/=4+/5 to improve strength for functional tasks.  4. Patient will improve TUG score to </=10.5 seconds to decrease falls risk in community.  5. Patient will improve 30 second sit to stand score to >/= to 14 repetitions to increase lower extremity strength for functional transitional activities.     PLAN   Lumbar AROM  Gross lower extremity strengthening  Endurance training  Functional activity training    Elizabeth Aparicio, PT

## 2023-03-07 ENCOUNTER — CLINICAL SUPPORT (OUTPATIENT)
Dept: REHABILITATION | Facility: HOSPITAL | Age: 73
End: 2023-03-07
Payer: MEDICARE

## 2023-03-07 ENCOUNTER — PES CALL (OUTPATIENT)
Dept: ADMINISTRATIVE | Facility: CLINIC | Age: 73
End: 2023-03-07
Payer: MEDICARE

## 2023-03-07 DIAGNOSIS — R68.89 DECREASED STRENGTH, ENDURANCE, AND MOBILITY: ICD-10-CM

## 2023-03-07 DIAGNOSIS — R53.1 DECREASED STRENGTH, ENDURANCE, AND MOBILITY: ICD-10-CM

## 2023-03-07 DIAGNOSIS — M53.86 DECREASED RANGE OF MOTION OF LUMBAR SPINE: Primary | ICD-10-CM

## 2023-03-07 DIAGNOSIS — Z74.09 DECREASED STRENGTH, ENDURANCE, AND MOBILITY: ICD-10-CM

## 2023-03-07 PROCEDURE — 97110 THERAPEUTIC EXERCISES: CPT | Mod: PN,CQ

## 2023-03-07 PROCEDURE — 97140 MANUAL THERAPY 1/> REGIONS: CPT | Mod: PN,CQ

## 2023-03-07 NOTE — PROGRESS NOTES
[facility]   Physical Therapy Treatment Note     Name: Nasima Pacheco  [facility] Number: [id number]    Therapy Diagnosis:   Encounter Diagnoses   Name Primary?    Decreased range of motion of lumbar spine Yes    Decreased strength, endurance, and mobility        Physician: CHAD Luong NP    Visit Date: 3/7/2023    Encounter Diagnoses   Name Primary?    Chronic bilateral low back pain without sciatica      DDD (degenerative disc disease), lumbar      Decreased range of motion of lumbar spine      Decreased strength, endurance, and mobility        Physician: CHAD Luong NP     Physician Orders: PT Eval and Treat   Medical Diagnosis from Referral:   M54.50,G89.29 (ICD-10-CM) - Chronic bilateral low back pain without sciatica   M51.36 (ICD-10-CM) - DDD (degenerative disc disease), lumbar      Evaluation Date: 2/16/2023  Authorization Period Expiration: 2/16/2024  Plan of Care Expiration: 4/13/2023  Progress Note Due: 3/16/2023  Visit # / Visits authorized: 4/20 (+ 2)   FOTO: 3/5     Precautions: Standard      PTA Visit #: 1/5     Time In: 1:00 PM  Time Out: 1:55 PM   Total Billable Time: 30 minutes - 1:1 physical therapist assistant (1 TE; 1 MT)      SUBJECTIVE     Pt reports: she 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      She was not compliant with home exercise program.  Response to previous treatment: decreased pain; soreness and fatigue  Functional change: Ongoing    Pain: 5/10; 0/10 end of session  Location: low back    OBJECTIVE     Objective Measures updated at progress report unless specified.     Palpation: (+) bilateral PSIS and bilateral piriformis    Treatment     Ines received the treatments listed below:      manual therapy techniques: Joint mobilizations and Soft tissue Mobilization were applied to the: left hip for 15 minutes 1:1 physical therapist assistant, Additional time supervised   , including:  Left lateral hip distraction - grade II-III  "with mobilization belt  IASTM with percussion to bilateral gluteus complex  Left manual piriformis stretch - NT    therapeutic exercises to develop strength, endurance, ROM, flexibility, posture, and core stabilization for 15 minutes 1:1 physical therapist assistant, Additional time supervised   including:  LTRs: 20x  Piriformis stretch: 3x30"  Single knee to chest stretch 3x30" bilateral   Nerve glides bilateral 10x10"   Bridges with hip abduction: green theraband - 3x10   Right sidelying clamshells: 3x10 with green theraband   Seated swiss ball rollouts: 15x   Standing hip abduction: 3x10 with red theraband   Standing hip extension: 3x10 with red thereaband     therapeutic activities to improve functional performance for 0 minutes, including:        Patient Education and Home Exercises     Home Exercises Provided and Patient Education Provided     Education provided:   - Necessity of home exercise program compliance    Written Home Exercises Provided: Patient instructed to cont prior HEP. Exercises were reviewed and Ines was able to demonstrate them prior to the end of the session.  Ines demonstrated good  understanding of the education provided. See EMR under Patient Instructions for exercises provided during therapy sessions    ASSESSMENT   Nasima is a 72 y.o. female referred to outpatient Physical Therapy with a medical diagnosis of M54.50,G89.29 (ICD-10-CM) - Chronic bilateral low back pain without sciatica and M51.36 (ICD-10-CM) - DDD (degenerative disc disease), lumbar. Patient presents with signs and symptoms consistent with low back pain with mobility deficits. She needs maximum cueing for effective exercise and stretch technique. Suspect poor technique for home exercise program. Continued with same exercises except for addition of resistance with gluteus strengthening. Continued IASTM to glute complex with good response and improved soft tissue mobility. Reports 0/10 pain exciting clinic     Ines Is " progressing well towards her goals.   Pt prognosis is Good.     Pt will continue to benefit from skilled outpatient physical therapy to address the deficits listed in the problem list box on initial evaluation, provide pt/family education and to maximize pt's level of independence in the home and community environment.     Pt's spiritual, cultural and educational needs considered and pt agreeable to plan of care and goals.     Anticipated barriers to physical therapy: language barrier; poor historian    Goals:   Short Term Goals (4 Weeks):  1. Patient will be compliant with home exercise program to supplement therapy in promoting functional mobility.  2. Patient will perform transverse abdominus contraction with good control to demonstrate improved core strength.  3. Patient will report no pain during thoracolumbar active range of motion to promote functional mobility.  4. Patient will improve impaired lower extremity myotomes/manual muscle tests  to >/=4/5 to improve strength for functional tasks.     Long Term Goals (8 Weeks):   1. Patient will improve FOTO score to </= 29% limited to decrease perceived limitation with maintaining/changing body position.   2. Patient will report pain at rest to </= 1/10 to improve quality of life.  3. Patient will improve impaired lower extremity myotomes/manual muscle tests to >/=4+/5 to improve strength for functional tasks.  4. Patient will improve TUG score to </=10.5 seconds to decrease falls risk in community.  5. Patient will improve 30 second sit to stand score to >/= to 14 repetitions to increase lower extremity strength for functional transitional activities.     PLAN   Lumbar AROM  Gross lower extremity strengthening  Endurance training  Functional activity training    Tejinder Tafoya, COURT

## 2023-03-13 ENCOUNTER — HOSPITAL ENCOUNTER (OUTPATIENT)
Dept: CARDIOLOGY | Facility: CLINIC | Age: 73
Discharge: HOME | End: 2023-03-13
Payer: MEDICARE

## 2023-03-13 ENCOUNTER — OFFICE VISIT (OUTPATIENT)
Dept: INTERNAL MEDICINE | Facility: CLINIC | Age: 73
End: 2023-03-13
Payer: MEDICARE

## 2023-03-13 ENCOUNTER — LAB VISIT (OUTPATIENT)
Dept: LAB | Facility: HOSPITAL | Age: 73
End: 2023-03-13
Attending: STUDENT IN AN ORGANIZED HEALTH CARE EDUCATION/TRAINING PROGRAM
Payer: MEDICARE

## 2023-03-13 ENCOUNTER — PATIENT MESSAGE (OUTPATIENT)
Dept: INTERNAL MEDICINE | Facility: CLINIC | Age: 73
End: 2023-03-13

## 2023-03-13 VITALS
SYSTOLIC BLOOD PRESSURE: 135 MMHG | DIASTOLIC BLOOD PRESSURE: 77 MMHG | BODY MASS INDEX: 26.44 KG/M2 | HEIGHT: 60 IN | HEART RATE: 70 BPM | WEIGHT: 134.69 LBS

## 2023-03-13 DIAGNOSIS — I10 PRIMARY HYPERTENSION: ICD-10-CM

## 2023-03-13 DIAGNOSIS — R79.9 ABNORMAL FINDING OF BLOOD CHEMISTRY, UNSPECIFIED: ICD-10-CM

## 2023-03-13 DIAGNOSIS — Z00.00 ANNUAL PHYSICAL EXAM: Primary | ICD-10-CM

## 2023-03-13 DIAGNOSIS — R94.6 ABNORMAL RESULTS OF THYROID FUNCTION STUDIES: ICD-10-CM

## 2023-03-13 DIAGNOSIS — R73.03 PREDIABETES: ICD-10-CM

## 2023-03-13 DIAGNOSIS — Z00.00 HEALTHCARE MAINTENANCE: ICD-10-CM

## 2023-03-13 DIAGNOSIS — M79.89 SWELLING OF LEFT LOWER EXTREMITY: ICD-10-CM

## 2023-03-13 DIAGNOSIS — F32.0 MILD MAJOR DEPRESSION, SINGLE EPISODE: ICD-10-CM

## 2023-03-13 LAB
ALBUMIN SERPL BCP-MCNC: 4.2 G/DL (ref 3.5–5.2)
ALP SERPL-CCNC: 127 U/L (ref 55–135)
ALT SERPL W/O P-5'-P-CCNC: 15 U/L (ref 10–44)
ANION GAP SERPL CALC-SCNC: 7 MMOL/L (ref 8–16)
AST SERPL-CCNC: 15 U/L (ref 10–40)
BASOPHILS # BLD AUTO: 0.03 K/UL (ref 0–0.2)
BASOPHILS NFR BLD: 0.6 % (ref 0–1.9)
BILIRUB SERPL-MCNC: 0.7 MG/DL (ref 0.1–1)
BILIRUB UR QL STRIP: NEGATIVE
BUN SERPL-MCNC: 11 MG/DL (ref 8–23)
CALCIUM SERPL-MCNC: 9.4 MG/DL (ref 8.7–10.5)
CHLORIDE SERPL-SCNC: 108 MMOL/L (ref 95–110)
CHOLEST SERPL-MCNC: 171 MG/DL (ref 120–199)
CHOLEST/HDLC SERPL: 3.3 {RATIO} (ref 2–5)
CLARITY UR REFRACT.AUTO: ABNORMAL
CO2 SERPL-SCNC: 28 MMOL/L (ref 23–29)
COLOR UR AUTO: YELLOW
CREAT SERPL-MCNC: 0.6 MG/DL (ref 0.5–1.4)
DIFFERENTIAL METHOD: NORMAL
EOSINOPHIL # BLD AUTO: 0.1 K/UL (ref 0–0.5)
EOSINOPHIL NFR BLD: 2.5 % (ref 0–8)
ERYTHROCYTE [DISTWIDTH] IN BLOOD BY AUTOMATED COUNT: 13.3 % (ref 11.5–14.5)
EST. GFR  (NO RACE VARIABLE): >60 ML/MIN/1.73 M^2
ESTIMATED AVG GLUCOSE: 123 MG/DL (ref 68–131)
GLUCOSE SERPL-MCNC: 105 MG/DL (ref 70–110)
GLUCOSE UR QL STRIP: NEGATIVE
HBA1C MFR BLD: 5.9 % (ref 4–5.6)
HCT VFR BLD AUTO: 40.7 % (ref 37–48.5)
HDLC SERPL-MCNC: 52 MG/DL (ref 40–75)
HDLC SERPL: 30.4 % (ref 20–50)
HGB BLD-MCNC: 13.3 G/DL (ref 12–16)
HGB UR QL STRIP: NEGATIVE
IMM GRANULOCYTES # BLD AUTO: 0 K/UL (ref 0–0.04)
IMM GRANULOCYTES NFR BLD AUTO: 0 % (ref 0–0.5)
KETONES UR QL STRIP: NEGATIVE
LDLC SERPL CALC-MCNC: 106.2 MG/DL (ref 63–159)
LEUKOCYTE ESTERASE UR QL STRIP: NEGATIVE
LYMPHOCYTES # BLD AUTO: 1.9 K/UL (ref 1–4.8)
LYMPHOCYTES NFR BLD: 39 % (ref 18–48)
MCH RBC QN AUTO: 30 PG (ref 27–31)
MCHC RBC AUTO-ENTMCNC: 32.7 G/DL (ref 32–36)
MCV RBC AUTO: 92 FL (ref 82–98)
MONOCYTES # BLD AUTO: 0.4 K/UL (ref 0.3–1)
MONOCYTES NFR BLD: 8.2 % (ref 4–15)
NEUTROPHILS # BLD AUTO: 2.4 K/UL (ref 1.8–7.7)
NEUTROPHILS NFR BLD: 49.7 % (ref 38–73)
NITRITE UR QL STRIP: NEGATIVE
NONHDLC SERPL-MCNC: 119 MG/DL
NRBC BLD-RTO: 0 /100 WBC
OB PNL STL: NEGATIVE
PH UR STRIP: 8 [PH] (ref 5–8)
PLATELET # BLD AUTO: 208 K/UL (ref 150–450)
PMV BLD AUTO: 10.7 FL (ref 9.2–12.9)
POTASSIUM SERPL-SCNC: 4.1 MMOL/L (ref 3.5–5.1)
PROT SERPL-MCNC: 6.7 G/DL (ref 6–8.4)
PROT UR QL STRIP: NEGATIVE
RBC # BLD AUTO: 4.44 M/UL (ref 4–5.4)
SODIUM SERPL-SCNC: 143 MMOL/L (ref 136–145)
SP GR UR STRIP: 1.01 (ref 1–1.03)
TRIGL SERPL-MCNC: 64 MG/DL (ref 30–150)
TSH SERPL DL<=0.005 MIU/L-ACNC: 2.13 UIU/ML (ref 0.4–4)
URN SPEC COLLECT METH UR: ABNORMAL
WBC # BLD AUTO: 4.85 K/UL (ref 3.9–12.7)

## 2023-03-13 PROCEDURE — 3044F PR MOST RECENT HEMOGLOBIN A1C LEVEL <7.0%: ICD-10-PCS | Mod: CPTII,S$GLB,, | Performed by: STUDENT IN AN ORGANIZED HEALTH CARE EDUCATION/TRAINING PROGRAM

## 2023-03-13 PROCEDURE — 3075F PR MOST RECENT SYSTOLIC BLOOD PRESS GE 130-139MM HG: ICD-10-PCS | Mod: CPTII,S$GLB,, | Performed by: STUDENT IN AN ORGANIZED HEALTH CARE EDUCATION/TRAINING PROGRAM

## 2023-03-13 PROCEDURE — 1159F MED LIST DOCD IN RCRD: CPT | Mod: CPTII,S$GLB,, | Performed by: STUDENT IN AN ORGANIZED HEALTH CARE EDUCATION/TRAINING PROGRAM

## 2023-03-13 PROCEDURE — 4010F ACE/ARB THERAPY RXD/TAKEN: CPT | Mod: CPTII,S$GLB,, | Performed by: STUDENT IN AN ORGANIZED HEALTH CARE EDUCATION/TRAINING PROGRAM

## 2023-03-13 PROCEDURE — 84443 ASSAY THYROID STIM HORMONE: CPT | Performed by: STUDENT IN AN ORGANIZED HEALTH CARE EDUCATION/TRAINING PROGRAM

## 2023-03-13 PROCEDURE — 1159F PR MEDICATION LIST DOCUMENTED IN MEDICAL RECORD: ICD-10-PCS | Mod: CPTII,S$GLB,, | Performed by: STUDENT IN AN ORGANIZED HEALTH CARE EDUCATION/TRAINING PROGRAM

## 2023-03-13 PROCEDURE — 1126F AMNT PAIN NOTED NONE PRSNT: CPT | Mod: CPTII,S$GLB,, | Performed by: STUDENT IN AN ORGANIZED HEALTH CARE EDUCATION/TRAINING PROGRAM

## 2023-03-13 PROCEDURE — 99999 PR PBB SHADOW E&M-EST. PATIENT-LVL III: CPT | Mod: PBBFAC,,, | Performed by: STUDENT IN AN ORGANIZED HEALTH CARE EDUCATION/TRAINING PROGRAM

## 2023-03-13 PROCEDURE — 99499 UNLISTED E&M SERVICE: CPT | Mod: S$GLB,,, | Performed by: STUDENT IN AN ORGANIZED HEALTH CARE EDUCATION/TRAINING PROGRAM

## 2023-03-13 PROCEDURE — 1160F RVW MEDS BY RX/DR IN RCRD: CPT | Mod: CPTII,S$GLB,, | Performed by: STUDENT IN AN ORGANIZED HEALTH CARE EDUCATION/TRAINING PROGRAM

## 2023-03-13 PROCEDURE — 83036 HEMOGLOBIN GLYCOSYLATED A1C: CPT | Performed by: STUDENT IN AN ORGANIZED HEALTH CARE EDUCATION/TRAINING PROGRAM

## 2023-03-13 PROCEDURE — 1101F PT FALLS ASSESS-DOCD LE1/YR: CPT | Mod: CPTII,S$GLB,, | Performed by: STUDENT IN AN ORGANIZED HEALTH CARE EDUCATION/TRAINING PROGRAM

## 2023-03-13 PROCEDURE — 3288F FALL RISK ASSESSMENT DOCD: CPT | Mod: CPTII,S$GLB,, | Performed by: STUDENT IN AN ORGANIZED HEALTH CARE EDUCATION/TRAINING PROGRAM

## 2023-03-13 PROCEDURE — 1160F PR REVIEW ALL MEDS BY PRESCRIBER/CLIN PHARMACIST DOCUMENTED: ICD-10-PCS | Mod: CPTII,S$GLB,, | Performed by: STUDENT IN AN ORGANIZED HEALTH CARE EDUCATION/TRAINING PROGRAM

## 2023-03-13 PROCEDURE — 1126F PR PAIN SEVERITY QUANTIFIED, NO PAIN PRESENT: ICD-10-PCS | Mod: CPTII,S$GLB,, | Performed by: STUDENT IN AN ORGANIZED HEALTH CARE EDUCATION/TRAINING PROGRAM

## 2023-03-13 PROCEDURE — 3288F PR FALLS RISK ASSESSMENT DOCUMENTED: ICD-10-PCS | Mod: CPTII,S$GLB,, | Performed by: STUDENT IN AN ORGANIZED HEALTH CARE EDUCATION/TRAINING PROGRAM

## 2023-03-13 PROCEDURE — 93005 ELECTROCARDIOGRAM TRACING: CPT | Mod: S$GLB,,, | Performed by: STUDENT IN AN ORGANIZED HEALTH CARE EDUCATION/TRAINING PROGRAM

## 2023-03-13 PROCEDURE — 93010 ELECTROCARDIOGRAM REPORT: CPT | Mod: S$GLB,,, | Performed by: INTERNAL MEDICINE

## 2023-03-13 PROCEDURE — 36415 COLL VENOUS BLD VENIPUNCTURE: CPT | Performed by: STUDENT IN AN ORGANIZED HEALTH CARE EDUCATION/TRAINING PROGRAM

## 2023-03-13 PROCEDURE — 99397 PER PM REEVAL EST PAT 65+ YR: CPT | Mod: GZ,S$GLB,, | Performed by: STUDENT IN AN ORGANIZED HEALTH CARE EDUCATION/TRAINING PROGRAM

## 2023-03-13 PROCEDURE — 81003 URINALYSIS AUTO W/O SCOPE: CPT | Performed by: STUDENT IN AN ORGANIZED HEALTH CARE EDUCATION/TRAINING PROGRAM

## 2023-03-13 PROCEDURE — 99999 PR PBB SHADOW E&M-EST. PATIENT-LVL III: ICD-10-PCS | Mod: PBBFAC,,, | Performed by: STUDENT IN AN ORGANIZED HEALTH CARE EDUCATION/TRAINING PROGRAM

## 2023-03-13 PROCEDURE — 3078F PR MOST RECENT DIASTOLIC BLOOD PRESSURE < 80 MM HG: ICD-10-PCS | Mod: CPTII,S$GLB,, | Performed by: STUDENT IN AN ORGANIZED HEALTH CARE EDUCATION/TRAINING PROGRAM

## 2023-03-13 PROCEDURE — 3044F HG A1C LEVEL LT 7.0%: CPT | Mod: CPTII,S$GLB,, | Performed by: STUDENT IN AN ORGANIZED HEALTH CARE EDUCATION/TRAINING PROGRAM

## 2023-03-13 PROCEDURE — 3008F BODY MASS INDEX DOCD: CPT | Mod: CPTII,S$GLB,, | Performed by: STUDENT IN AN ORGANIZED HEALTH CARE EDUCATION/TRAINING PROGRAM

## 2023-03-13 PROCEDURE — 93010 EKG 12-LEAD: ICD-10-PCS | Mod: S$GLB,,, | Performed by: INTERNAL MEDICINE

## 2023-03-13 PROCEDURE — 1101F PR PT FALLS ASSESS DOC 0-1 FALLS W/OUT INJ PAST YR: ICD-10-PCS | Mod: CPTII,S$GLB,, | Performed by: STUDENT IN AN ORGANIZED HEALTH CARE EDUCATION/TRAINING PROGRAM

## 2023-03-13 PROCEDURE — 80053 COMPREHEN METABOLIC PANEL: CPT | Performed by: STUDENT IN AN ORGANIZED HEALTH CARE EDUCATION/TRAINING PROGRAM

## 2023-03-13 PROCEDURE — 3078F DIAST BP <80 MM HG: CPT | Mod: CPTII,S$GLB,, | Performed by: STUDENT IN AN ORGANIZED HEALTH CARE EDUCATION/TRAINING PROGRAM

## 2023-03-13 PROCEDURE — 85025 COMPLETE CBC W/AUTO DIFF WBC: CPT | Performed by: STUDENT IN AN ORGANIZED HEALTH CARE EDUCATION/TRAINING PROGRAM

## 2023-03-13 PROCEDURE — 93005 EKG 12-LEAD: ICD-10-PCS | Mod: S$GLB,,, | Performed by: STUDENT IN AN ORGANIZED HEALTH CARE EDUCATION/TRAINING PROGRAM

## 2023-03-13 PROCEDURE — 4010F PR ACE/ARB THEARPY RXD/TAKEN: ICD-10-PCS | Mod: CPTII,S$GLB,, | Performed by: STUDENT IN AN ORGANIZED HEALTH CARE EDUCATION/TRAINING PROGRAM

## 2023-03-13 PROCEDURE — 3075F SYST BP GE 130 - 139MM HG: CPT | Mod: CPTII,S$GLB,, | Performed by: STUDENT IN AN ORGANIZED HEALTH CARE EDUCATION/TRAINING PROGRAM

## 2023-03-13 PROCEDURE — 80061 LIPID PANEL: CPT | Performed by: STUDENT IN AN ORGANIZED HEALTH CARE EDUCATION/TRAINING PROGRAM

## 2023-03-13 PROCEDURE — 3008F PR BODY MASS INDEX (BMI) DOCUMENTED: ICD-10-PCS | Mod: CPTII,S$GLB,, | Performed by: STUDENT IN AN ORGANIZED HEALTH CARE EDUCATION/TRAINING PROGRAM

## 2023-03-13 PROCEDURE — 99499 RISK ADDL DX/OHS AUDIT: ICD-10-PCS | Mod: S$GLB,,, | Performed by: STUDENT IN AN ORGANIZED HEALTH CARE EDUCATION/TRAINING PROGRAM

## 2023-03-13 PROCEDURE — 82270 OCCULT BLOOD FECES: CPT | Performed by: STUDENT IN AN ORGANIZED HEALTH CARE EDUCATION/TRAINING PROGRAM

## 2023-03-13 PROCEDURE — 99397 PR PREVENTIVE VISIT,EST,65 & OVER: ICD-10-PCS | Mod: GZ,S$GLB,, | Performed by: STUDENT IN AN ORGANIZED HEALTH CARE EDUCATION/TRAINING PROGRAM

## 2023-03-13 RX ORDER — SERTRALINE HYDROCHLORIDE 25 MG/1
25 TABLET, FILM COATED ORAL DAILY
Qty: 90 TABLET | Refills: 1 | Status: SHIPPED | OUTPATIENT
Start: 2023-03-13 | End: 2023-10-30

## 2023-03-13 NOTE — ASSESSMENT & PLAN NOTE
Unresolved grief  PHQ9=5  GAD7=7  Will start patient on SSR (sertraline 25 mg qd)  F/u in 1 month.

## 2023-03-13 NOTE — PROGRESS NOTES
[facility]   Physical Therapy Treatment Note     Name: Nasima Pacheco  [facility] Number: [id number]    Therapy Diagnosis:   Encounter Diagnoses   Name Primary?    Decreased range of motion of lumbar spine Yes    Decreased strength, endurance, and mobility            Physician: CHAD Luong NP    Visit Date: 3/14/2023    Encounter Diagnoses   Name Primary?    Chronic bilateral low back pain without sciatica      DDD (degenerative disc disease), lumbar      Decreased range of motion of lumbar spine      Decreased strength, endurance, and mobility        Physician: CHAD Luong NP     Physician Orders: PT Eval and Treat   Medical Diagnosis from Referral:   M54.50,G89.29 (ICD-10-CM) - Chronic bilateral low back pain without sciatica   M51.36 (ICD-10-CM) - DDD (degenerative disc disease), lumbar      Evaluation Date: 2/16/2023  Authorization Period Expiration: 2/16/2024  Plan of Care Expiration: 4/13/2023  Progress Note Due: 3/16/2023  Visit # / Visits authorized: 6/20 (+ 2)   FOTO: 5/5     Precautions: Standard      PTA Visit #: 2/5     Time In: 3:00 PM  Time Out: 3:55 PM   Total Billable Time: 55 minutes - 1:1 physical therapist assistant (1 TE; 1 MT)      SUBJECTIVE     Pt reports: she 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 She reports increased low back pain on Sunday. She was  not doing anything out of ordinary. She has done her home exercise program only once since last here and that was on Saturday.        She was not compliant with home exercise program.  Response to previous treatment: decreased pain; soreness and fatigue  Functional change: Ongoing    Pain: 5/10; 0/10 end of session  Location: low back    OBJECTIVE     Objective Measures updated at progress report unless specified.     Palpation: (+) bilateral PSIS and bilateral piriformis    Treatment     Ines received the treatments listed below:      manual therapy techniques: Joint mobilizations and  "Soft tissue Mobilization were applied to the: left hip for 00 minutes 1:1 physical therapist assistant,   , including:  Left lateral hip distraction - grade II-III with mobilization belt   IASTM with percussion to bilateral gluteus complex Not performed today   Left manual piriformis stretch - NT    therapeutic exercises to develop strength, endurance, ROM, flexibility, posture, and core stabilization for 50 minutes 1:1 physical therapist assistant,  including:  Transverse abdominus x15 reps with  5" hold  LTRs: 20x  Supine clamshells 2x10 with green theraband and 3 sec hold  Piriformis stretch: 3x30"  Single knee to chest stretch 3x30" bilateral   Nerve glides bilateral 10x10"  Hamstring stretches 3x30" bilateral with strap   Reverse crunches 2 minutes with green Swiss ball  Bridges with hip abduction: green theraband - 3x10 with 5 sec holds  Right sidelying clamshells: 3x10 with green theraband   Seated swiss ball rollouts: 15x   Sit to stand from chair 2x15 reps   Seated thoracic extension mobility with sm maroon bolster behind back 20x  Standing hip abduction: 3x10 with red theraband   Standing hip extension: 3x10 with red thereaband     therapeutic activities to improve functional performance for 10 minutes, including:  Walking  5 laps as functional activity activity tolerance with 2# DB bilateral        Patient Education and Home Exercises     Home Exercises Provided and Patient Education Provided     Education provided:   - Necessity of home exercise program compliance    Written Home Exercises Provided: Patient instructed to cont prior HEP. Exercises were reviewed and Ines was able to demonstrate them prior to the end of the session.  Ines demonstrated good  understanding of the education provided. See EMR under Patient Instructions for exercises provided during therapy sessions    ASSESSMENT   Nasima is a 72 y.o. female referred to outpatient Physical Therapy with a medical diagnosis of M54.50,G89.29 " (ICD-10-CM) - Chronic bilateral low back pain without sciatica and M51.36 (ICD-10-CM) - DDD (degenerative disc disease), lumbar. Patient presents with signs and symptoms consistent with low back pain with mobility deficits. She needs maximum cueing for effective exercise and stretch technique. Suspect poor technique and extremely poor compliance with home exercise program. Progressed exercises given she is pain free today.  No Manual today as she is pain free. Experienced an episode of dizziness just seated on mat in a static position which was quite dramatic. This resolved immediately.  Reports 0/10 pain exciting clinic.     Ines Is progressing well towards her goals.   Pt prognosis is Good.     Pt will continue to benefit from skilled outpatient physical therapy to address the deficits listed in the problem list box on initial evaluation, provide pt/family education and to maximize pt's level of independence in the home and community environment.     Pt's spiritual, cultural and educational needs considered and pt agreeable to plan of care and goals.     Anticipated barriers to physical therapy: language barrier; poor historian    Goals:   Short Term Goals (4 Weeks):  1. Patient will be compliant with home exercise program to supplement therapy in promoting functional mobility.  2. Patient will perform transverse abdominus contraction with good control to demonstrate improved core strength.  3. Patient will report no pain during thoracolumbar active range of motion to promote functional mobility.  4. Patient will improve impaired lower extremity myotomes/manual muscle tests  to >/=4/5 to improve strength for functional tasks.     Long Term Goals (8 Weeks):   1. Patient will improve FOTO score to </= 29% limited to decrease perceived limitation with maintaining/changing body position.   2. Patient will report pain at rest to </= 1/10 to improve quality of life.  3. Patient will improve impaired lower extremity  myotomes/manual muscle tests to >/=4+/5 to improve strength for functional tasks.  4. Patient will improve TUG score to </=10.5 seconds to decrease falls risk in community.  5. Patient will improve 30 second sit to stand score to >/= to 14 repetitions to increase lower extremity strength for functional transitional activities.     PLAN   Lumbar AROM  Gross lower extremity strengthening  Endurance training  Functional activity training    Tejinder Tafoya, PTA

## 2023-03-13 NOTE — ASSESSMENT & PLAN NOTE
Likely 2/2 venous insufficiency Vs medication adverse effect (amlodipine)  Recommended compression stockings, continue physical activity and walking, leg elevation. Will keep amlodipine for now.

## 2023-03-13 NOTE — PROGRESS NOTES
Subjective:       Patient ID: Nasima Pacheco is a 72 y.o. female.    Chief Complaint: Follow-up (Annual wellness)    HPI    Nasima Pacheco is a 72 y.o. female , [place]ese speaking, with a history of:  HTN  HLD  BPPV  OA  Back pain      Patient comes to the clinic for her annual wellness visit.    She reports feeling well in general.  She started physical therapy for the past 2 weeks, she had been in pain at the beginning, but she is now feeling better.    She still feels very sad about the loss of her son Dimas, it has been 2 years since his death. She feels very bad and sad about it.  She would like a referral to cardiology for a f/u, her previous cardiologist retired or changed practices. Last seen by cardiology in 2020.    Patient is currently asymptomatic and has no complaints.  Patient denies CV symptoms, CP, SOB, palpitations.  Patient denies constitutional symptoms, fever, changes in the urine or stool.  She reports moderate BL leg swelling but she is not wearing the compression stockings, recommended in last appointment.    Today's labs:  CBC WNL  CMP WNL  Lipid panel: , HDL 52, , TG 64  A1c: 5.9  TSH WNL 2.131  Hepatitis C Non-reactive  HIV Negative  UA WNL      Since last seen by me:   10/11/22    H/o ER visits:   NO    H/o Hospitalizations:  NO    Specialists visits and recommendations:   Orthopedics 1/26/23: Started patient on physical therapy, tizanidine, gabapentin.    H/o falls: None    Changes in health or medications:   Ortho added gabapentin and tizanidine for pain    Life events / lifestyle:   Nothing new    Healthcare Maintenance:  Colonoscopy: FOBT   Vaccinations: Pneumonia 2016, Zoster (no), Tetanus (no)  COVID vaccination: completed x 2  Depression screening: PHQ2 score = 0    Annual Wellness visit approx. Month: March ROS 11-point review of systems done. Negative except for detailed in the HPI.        Objective:      Vitals:    03/13/23 0859 03/13/23 0934   BP: (!)  140/64 135/77   Pulse: 70    Weight: 61.1 kg (134 lb 11.2 oz)    Height: 5' (1.524 m)          Physical Exam  Vitals and nursing note reviewed.   Constitutional:       Appearance: Normal appearance.   HENT:      Head: Normocephalic and atraumatic.      Right Ear: Tympanic membrane normal.      Left Ear: Tympanic membrane normal.      Nose: Nose normal.      Mouth/Throat:      Mouth: Mucous membranes are moist.      Pharynx: Oropharynx is clear.   Eyes:      Extraocular Movements: Extraocular movements intact.      Conjunctiva/sclera: Conjunctivae normal.      Pupils: Pupils are equal, round, and reactive to light.   Cardiovascular:      Rate and Rhythm: Normal rate and regular rhythm.      Pulses: Normal pulses.      Heart sounds: Normal heart sounds.   Pulmonary:      Effort: Pulmonary effort is normal.      Breath sounds: Normal breath sounds.   Abdominal:      General: Bowel sounds are normal. There is no distension.      Palpations: Abdomen is soft.      Tenderness: There is no abdominal tenderness.   Musculoskeletal:         General: Normal range of motion.      Cervical back: Normal range of motion and neck supple.      Comments: BLLE edema   Skin:     General: Skin is warm.   Neurological:      General: No focal deficit present.      Mental Status: She is alert and oriented to person, place, and time. Mental status is at baseline.   Psychiatric:         Mood and Affect: Mood normal. Affect is tearful.          Assessment:       1. Annual physical exam  Assessment & Plan:  Patient is in overall good general health.  Stable medical conditions listed on the PMH.  Labs reviewed and patient notified.        2. Primary hypertension  Assessment & Plan:  Controlled.  Will continue same management:  amlodipine-benazepril 10-20mg (LOTREL) 10-20 mg per capsule, hctz    Patient is experincing BLLE edema, if it doesn't improve with the compression stockings, I will be changing amlodipine, for now, we will continue  amlodipine as it is unless cardiology recommends otherwise.    Recommended to monitor blood pressure regularly, eat a well-balanced diet that's low in salt, DASH diet, enjoy regular physical activity, manage stress, maintain a healthy weight, take medications properly.      Orders:  -     CBC Auto Differential; Future; Expected date: 03/13/2023  -     Comprehensive Metabolic Panel; Future; Expected date: 03/13/2023  -     Hemoglobin A1C; Future; Expected date: 03/13/2023  -     Lipid Panel; Future; Expected date: 03/13/2023  -     Urinalysis; Future; Expected date: 03/13/2023  -     Ambulatory referral/consult to Cardiology; Future; Expected date: 03/20/2023  -     EKG 12-lead; Future    3. Abnormal finding of blood chemistry, unspecified  -     Hemoglobin A1C; Future; Expected date: 03/13/2023  -     TSH; Future; Expected date: 03/13/2023    4. Abnormal results of thyroid function studies  -     TSH; Future; Expected date: 03/13/2023    5. Swelling of left lower extremity  Assessment & Plan:  Likely 2/2 venous insufficiency Vs medication adverse effect (amlodipine)  Recommended compression stockings, continue physical activity and walking, leg elevation. Will keep amlodipine for now.      6. Mild major depression, single episode  Assessment & Plan:  Unresolved grief  PHQ9=5  GAD7=7  Will start patient on SSR (sertraline 25 mg qd)  F/u in 1 month.    Orders:  -     sertraline (ZOLOFT) 25 MG tablet; Take 1 tablet (25 mg total) by mouth once daily.  Dispense: 90 tablet; Refill: 1    7. Prediabetes  Assessment & Plan:  A1C 5.9  Will manage with lifestyle modifications, diet, weight loss and physical activity.  Education provided.        8. Healthcare maintenance  Assessment & Plan:  Health care maintenance and core gaps reviewed and assessed with patient.  Vaccinations recommended:        Tetanus - O       Shingles - O       Influenza - 2022       Pneumonia - 2016  Colon cancer screening:   FOBT Ordered  Lifestyle  recommendations given.  Physical activity recommended.    Legend: Ordered (O), Declined (D), Current (C)      Orders:  -     Zoster Recombinant Vaccine; Standing  -     diphth,pertus,acell,,tetanus (BOOSTRIX) 2.5-8-5 Lf-mcg-Lf/0.5mL Susp; Inject 0.5 mLs into the muscle once. for 1 dose  Dispense: 0.5 mL; Refill: 0  -     Occult Blood Stool, CA Screen; Future; Expected date: 03/13/2023       Plan:       - Labs ordered  - Compression stockings recommended  -ECG ordered  -Referred to cardiology  - Sertraline 25 mg qd started    Education provided  Lifestyle recommendations given  AVS printed, explained, and given to the patient.  RTC in : 1 month for depression F/U  Then in 5 months for prediabetes / BP f/u  1 year for annual wellness visit, labs not ordered yet.            OLGA PHILLIPS MD, MPH  Internal Medicine  [facility]  [facility]

## 2023-03-13 NOTE — ASSESSMENT & PLAN NOTE
Controlled.  Will continue same management:  amlodipine-benazepril 10-20mg (LOTREL) 10-20 mg per capsule, hctz    Patient is experincing BLLE edema, if it doesn't improve with the compression stockings, I will be changing amlodipine, for now, we will continue amlodipine as it is unless cardiology recommends otherwise.    Recommended to monitor blood pressure regularly, eat a well-balanced diet that's low in salt, DASH diet, enjoy regular physical activity, manage stress, maintain a healthy weight, take medications properly.

## 2023-03-13 NOTE — ASSESSMENT & PLAN NOTE
Health care maintenance and core gaps reviewed and assessed with patient.  Vaccinations recommended:        Tetanus - O       Shingles - O       Influenza - 2022       Pneumonia - 2016  Colon cancer screening:   FOBT Ordered  Lifestyle recommendations given.  Physical activity recommended.    Legend: Ordered (O), Declined (D), Current (C)

## 2023-03-14 ENCOUNTER — CLINICAL SUPPORT (OUTPATIENT)
Dept: REHABILITATION | Facility: HOSPITAL | Age: 73
End: 2023-03-14
Payer: MEDICARE

## 2023-03-14 DIAGNOSIS — M53.86 DECREASED RANGE OF MOTION OF LUMBAR SPINE: Primary | ICD-10-CM

## 2023-03-14 DIAGNOSIS — R68.89 DECREASED STRENGTH, ENDURANCE, AND MOBILITY: ICD-10-CM

## 2023-03-14 DIAGNOSIS — Z74.09 DECREASED STRENGTH, ENDURANCE, AND MOBILITY: ICD-10-CM

## 2023-03-14 DIAGNOSIS — R53.1 DECREASED STRENGTH, ENDURANCE, AND MOBILITY: ICD-10-CM

## 2023-03-14 PROCEDURE — 97110 THERAPEUTIC EXERCISES: CPT | Mod: PN,CQ

## 2023-03-14 PROCEDURE — 97530 THERAPEUTIC ACTIVITIES: CPT | Mod: PN,CQ

## 2023-03-17 ENCOUNTER — CLINICAL SUPPORT (OUTPATIENT)
Dept: REHABILITATION | Facility: HOSPITAL | Age: 73
End: 2023-03-17
Payer: MEDICARE

## 2023-03-17 DIAGNOSIS — R68.89 DECREASED STRENGTH, ENDURANCE, AND MOBILITY: ICD-10-CM

## 2023-03-17 DIAGNOSIS — M53.86 DECREASED RANGE OF MOTION OF LUMBAR SPINE: Primary | ICD-10-CM

## 2023-03-17 DIAGNOSIS — Z74.09 DECREASED STRENGTH, ENDURANCE, AND MOBILITY: ICD-10-CM

## 2023-03-17 DIAGNOSIS — R53.1 DECREASED STRENGTH, ENDURANCE, AND MOBILITY: ICD-10-CM

## 2023-03-17 PROCEDURE — 97110 THERAPEUTIC EXERCISES: CPT | Mod: PN

## 2023-03-17 PROCEDURE — 97530 THERAPEUTIC ACTIVITIES: CPT | Mod: PN

## 2023-03-17 NOTE — PROGRESS NOTES
[facility]   Physical Therapy Treatment Note     Name: Nasima Pacheco  [facility] Number: [id number]    Therapy Diagnosis:   Encounter Diagnoses   Name Primary?    Decreased range of motion of lumbar spine Yes    Decreased strength, endurance, and mobility            Physician: CHAD Luong NP    Visit Date: 3/17/2023    Encounter Diagnoses   Name Primary?    Chronic bilateral low back pain without sciatica      DDD (degenerative disc disease), lumbar      Decreased range of motion of lumbar spine      Decreased strength, endurance, and mobility        Physician: CHAD Luong NP     Physician Orders: PT Eval and Treat   Medical Diagnosis from Referral:   M54.50,G89.29 (ICD-10-CM) - Chronic bilateral low back pain without sciatica   M51.36 (ICD-10-CM) - DDD (degenerative disc disease), lumbar      Evaluation Date: 2/16/2023  Authorization Period Expiration: 2/16/2024  Plan of Care Expiration: 4/13/2023  Progress Note Due: 3/16/2023  Visit # / Visits authorized: 6/20 (+ 2)   FOTO: 5/5     Precautions: Standard      PTA Visit #: 2/5     Time In: 3:00 PM  Time Out: 3:55 PM   Total Billable Time: 55 minutes - 1:1 physical therapist assistant (2 TE; 2 TA)      SUBJECTIVE     Pt reports: no low back pain this session. She is able to walk and stand/cook for about 20-25 minutes until pain occurs. She has been pain-free since Sunday. Still not doing her home exercise program.     She was not compliant with home exercise program.  Response to previous treatment: decreased pain   Functional change: Ongoing    Pain: 0/10 end of session  Location: low back    OBJECTIVE     Objective Measures updated at progress report unless specified.     Treatment     Ines received the treatments listed below:        therapeutic exercises to develop strength, endurance, ROM, flexibility, posture, and core stabilization for 25 minutes 1:1 physical therapist assistant,  including:  Straight arm pull downs; red  "theraband - 2x10   Rows: green theraband - 2x10   Low rows: red theraband - 2x10  Standing open books: 15x bilateral   Lateral band walks: red theraband - 3 laps in // bars   Seated thoracic extension mobility with sm maroon bolster behind back 20x      therapeutic activities to improve functional performance for 30 minutes, including:  Walking  5 laps as functional activity activity tolerance with 4# DB bilateral    Step ups: 6" blue step - 20x no upper extremity support  Lateral steps overs: 6" step - 15x  Narrow stance on black foam board with chest press: 4 lb dumbbell   Sit to stand from chair: 15x red theraband above knees and 4lb dumbbell     Patient Education and Home Exercises     Home Exercises Provided and Patient Education Provided     Education provided:   - Necessity of home exercise program compliance    Written Home Exercises Provided: Patient instructed to cont prior HEP. Exercises were reviewed and Ines was able to demonstrate them prior to the end of the session.  Ines demonstrated good  understanding of the education provided. See EMR under Patient Instructions for exercises provided during therapy sessions    ASSESSMENT   Nasima is a 72 y.o. female referred to outpatient Physical Therapy with a medical diagnosis of M54.50,G89.29 (ICD-10-CM) - Chronic bilateral low back pain without sciatica and M51.36 (ICD-10-CM) - DDD (degenerative disc disease), lumbar. Patient presents with signs and symptoms consistent with low back pain with mobility deficits. Presents with grandson for interpretation. Progressed to standing exercises focusing on posterior chain and gluteus complex strengthening. Improved tolerance to standing activities. Also, added balance training as she feels unstable at times. Will progress to mimicking ADLs such as sweeping and mopping next visit.      Ines Is progressing well towards her goals.   Pt prognosis is Good.     Pt will continue to benefit from skilled outpatient physical " therapy to address the deficits listed in the problem list box on initial evaluation, provide pt/family education and to maximize pt's level of independence in the home and community environment.     Pt's spiritual, cultural and educational needs considered and pt agreeable to plan of care and goals.     Anticipated barriers to physical therapy: language barrier; poor historian    Goals:   Short Term Goals (4 Weeks):  1. Patient will be compliant with home exercise program to supplement therapy in promoting functional mobility.  2. Patient will perform transverse abdominus contraction with good control to demonstrate improved core strength.  3. Patient will report no pain during thoracolumbar active range of motion to promote functional mobility.  4. Patient will improve impaired lower extremity myotomes/manual muscle tests  to >/=4/5 to improve strength for functional tasks.     Long Term Goals (8 Weeks):   1. Patient will improve FOTO score to </= 29% limited to decrease perceived limitation with maintaining/changing body position.   2. Patient will report pain at rest to </= 1/10 to improve quality of life.  3. Patient will improve impaired lower extremity myotomes/manual muscle tests to >/=4+/5 to improve strength for functional tasks.  4. Patient will improve TUG score to </=10.5 seconds to decrease falls risk in community.  5. Patient will improve 30 second sit to stand score to >/= to 14 repetitions to increase lower extremity strength for functional transitional activities.     PLAN   Posterior chain and gluteus strengthening   Functional strengthening and activity tolerance   Walking endurance     Elizabeth Aparicio, PT

## 2023-03-21 ENCOUNTER — CLINICAL SUPPORT (OUTPATIENT)
Dept: REHABILITATION | Facility: HOSPITAL | Age: 73
End: 2023-03-21
Payer: MEDICARE

## 2023-03-21 DIAGNOSIS — Z74.09 DECREASED STRENGTH, ENDURANCE, AND MOBILITY: ICD-10-CM

## 2023-03-21 DIAGNOSIS — R68.89 DECREASED STRENGTH, ENDURANCE, AND MOBILITY: ICD-10-CM

## 2023-03-21 DIAGNOSIS — R53.1 DECREASED STRENGTH, ENDURANCE, AND MOBILITY: ICD-10-CM

## 2023-03-21 DIAGNOSIS — M53.86 DECREASED RANGE OF MOTION OF LUMBAR SPINE: Primary | ICD-10-CM

## 2023-03-21 PROCEDURE — 97112 NEUROMUSCULAR REEDUCATION: CPT | Mod: PN,CQ

## 2023-03-21 PROCEDURE — 97530 THERAPEUTIC ACTIVITIES: CPT | Mod: PN,CQ

## 2023-03-21 PROCEDURE — 97110 THERAPEUTIC EXERCISES: CPT | Mod: PN,CQ

## 2023-03-21 NOTE — PROGRESS NOTES
[facility]   Physical Therapy Treatment Note     Name: Nasima Pacheco  [facility] Number: [id number]    Therapy Diagnosis:   Encounter Diagnoses   Name Primary?    Decreased range of motion of lumbar spine Yes    Decreased strength, endurance, and mobility                Physician: CHAD Luong NP    Visit Date: 3/21/2023    Encounter Diagnoses   Name Primary?    Chronic bilateral low back pain without sciatica      DDD (degenerative disc disease), lumbar      Decreased range of motion of lumbar spine      Decreased strength, endurance, and mobility        Physician: CHAD Luong NP     Physician Orders: PT Eval and Treat   Medical Diagnosis from Referral:   M54.50,G89.29 (ICD-10-CM) - Chronic bilateral low back pain without sciatica   M51.36 (ICD-10-CM) - DDD (degenerative disc disease), lumbar      Evaluation Date: 2/16/2023  Authorization Period Expiration: 2/16/2024  Plan of Care Expiration: 4/13/2023  Progress Note Due: 3/16/2023  Visit # / Visits authorized: 6/20 (+ 2)   FOTO: 5/5     Precautions: Standard      PTA Visit #: 2/5     Time In: 3:00 PM  Time Out: 3:55 PM   Total Billable Time: 55 minutes - 1:1 physical therapist assistant (2 TE; 2 TA)      SUBJECTIVE     Pt reports: minimal back pain currently. She did have some increased pain this morning when cooking. Still poorly compliant with home exercise program .    She was not compliant with home exercise program.  Response to previous treatment: decreased pain   Functional change: Ongoing    Pain: 2/10 end of session  Location: low back    OBJECTIVE     Objective Measures updated at progress report unless specified.     Treatment     Ines received the treatments listed below:        therapeutic exercises to develop strength, endurance, ROM, flexibility, posture, and core stabilization for 10 minutes 1:1 physical therapist assistant,  including:  Straight arm pull downs; red theraband - 2x10   Rows: green theraband -  "3x10   Low rows: red theraband - 2x10  Standing open books: 15x bilateral   Lateral band walks: red theraband - 3 laps in open gym  Seated thoracic extension mobility with sm maroon bolster behind back 20x      therapeutic activities to improve functional performance for 35 minutes, including:  Walking  5 laps as functional activity activity tolerance with 4# DB bilateral    Step ups: 6" blue step - 20x no upper extremity support  Lateral steps overs: 6" step - 2x10 reps   Narrow stance on black foam board with chest press: 4 lb dumbbell   Sit to stand from chair: 2x10 reps  red theraband above knees and 4lb dumbbell   Simulated mopping technique with cueing for body mechanics and neutral spine 2 minutes   Simulated sweeping  technique with cueing for body mechanics and neutral spine 2 minutes     neuromuscular re-education activities to improve: Balance, Coordination, Kinesthetic, Sense, Proprioception, and Posture for 10 minutes. The following activities were included:   Standing on Airex foam in narrow base of support with horizontal head turns 1 minutes CGA    Standing on Airex foam in narrow base of support with vertical head nods  1 minutes CGA       Patient Education and Home Exercises     Home Exercises Provided and Patient Education Provided     Education provided:   - Necessity of home exercise program compliance    Written Home Exercises Provided: Patient instructed to cont prior HEP. Exercises were reviewed and Ines was able to demonstrate them prior to the end of the session.  Ines demonstrated good  understanding of the education provided. See EMR under Patient Instructions for exercises provided during therapy sessions    ASSESSMENT   Nasima is a 72 y.o. female referred to outpatient Physical Therapy with a medical diagnosis of M54.50,G89.29 (ICD-10-CM) - Chronic bilateral low back pain without sciatica and M51.36 (ICD-10-CM) - DDD (degenerative disc disease), lumbar. Patient presents with signs and " symptoms consistent with low back pain with mobility deficits. Presents with continued low levels of low back pain which is worst when she stands for longer periods of time cooking. Today we continued standing exercises focusing on posterior chain and gluteus complex strengthening. Improved tolerance to standing activities. Continued balance training as she feels unstable at times. Simulated  sweeping and mopping emphasized body mechanics and neutral spine and  was well tolerated.       Ines Is progressing well towards her goals.   Pt prognosis is Good.     Pt will continue to benefit from skilled outpatient physical therapy to address the deficits listed in the problem list box on initial evaluation, provide pt/family education and to maximize pt's level of independence in the home and community environment.     Pt's spiritual, cultural and educational needs considered and pt agreeable to plan of care and goals.     Anticipated barriers to physical therapy: language barrier; poor historian    Goals:   Short Term Goals (4 Weeks):  1. Patient will be compliant with home exercise program to supplement therapy in promoting functional mobility.  2. Patient will perform transverse abdominus contraction with good control to demonstrate improved core strength.  3. Patient will report no pain during thoracolumbar active range of motion to promote functional mobility.  4. Patient will improve impaired lower extremity myotomes/manual muscle tests  to >/=4/5 to improve strength for functional tasks.     Long Term Goals (8 Weeks):   1. Patient will improve FOTO score to </= 29% limited to decrease perceived limitation with maintaining/changing body position.   2. Patient will report pain at rest to </= 1/10 to improve quality of life.  3. Patient will improve impaired lower extremity myotomes/manual muscle tests to >/=4+/5 to improve strength for functional tasks.  4. Patient will improve TUG score to </=10.5 seconds to  decrease falls risk in community.  5. Patient will improve 30 second sit to stand score to >/= to 14 repetitions to increase lower extremity strength for functional transitional activities.     PLAN   Posterior chain and gluteus strengthening   Functional strengthening and activity tolerance   Walking endurance     Tejinder Tafoya PTA

## 2023-03-28 ENCOUNTER — DOCUMENTATION ONLY (OUTPATIENT)
Dept: REHABILITATION | Facility: HOSPITAL | Age: 73
End: 2023-03-28

## 2023-03-28 ENCOUNTER — CLINICAL SUPPORT (OUTPATIENT)
Dept: REHABILITATION | Facility: HOSPITAL | Age: 73
End: 2023-03-28
Payer: MEDICARE

## 2023-03-28 DIAGNOSIS — Z74.09 DECREASED STRENGTH, ENDURANCE, AND MOBILITY: ICD-10-CM

## 2023-03-28 DIAGNOSIS — R68.89 DECREASED STRENGTH, ENDURANCE, AND MOBILITY: ICD-10-CM

## 2023-03-28 DIAGNOSIS — M53.86 DECREASED RANGE OF MOTION OF LUMBAR SPINE: Primary | ICD-10-CM

## 2023-03-28 DIAGNOSIS — R53.1 DECREASED STRENGTH, ENDURANCE, AND MOBILITY: ICD-10-CM

## 2023-03-28 PROCEDURE — 97112 NEUROMUSCULAR REEDUCATION: CPT | Mod: PN,CQ

## 2023-03-28 PROCEDURE — 97530 THERAPEUTIC ACTIVITIES: CPT | Mod: PN,CQ

## 2023-03-28 PROCEDURE — 97110 THERAPEUTIC EXERCISES: CPT | Mod: PN,CQ

## 2023-03-28 NOTE — PROGRESS NOTES
Face to face meeting completed with Elizabeth Aparicio PT regarding current status and progress of   Nasima Pacheco .  Tejinder Tafoya, PTA

## 2023-03-28 NOTE — PROGRESS NOTES
[facility]   Physical Therapy Treatment Note     Name: Nasima Pacheco  [facility] Number: [id number]    Therapy Diagnosis:   Encounter Diagnoses   Name Primary?    Decreased range of motion of lumbar spine Yes    Decreased strength, endurance, and mobility                    Physician: CHAD Luong NP    Visit Date: 3/28/2023    Encounter Diagnoses   Name Primary?    Chronic bilateral low back pain without sciatica      DDD (degenerative disc disease), lumbar      Decreased range of motion of lumbar spine      Decreased strength, endurance, and mobility        Physician: CHAD Luong NP     Physician Orders: PT Eval and Treat   Medical Diagnosis from Referral:   M54.50,G89.29 (ICD-10-CM) - Chronic bilateral low back pain without sciatica   M51.36 (ICD-10-CM) - DDD (degenerative disc disease), lumbar      Evaluation Date: 2/16/2023  Authorization Period Expiration: 2/16/2024  Plan of Care Expiration: 4/13/2023  Progress Note Due: 3/16/2023  Visit # / Visits authorized: 7/20 (+ 2)   FOTO: 6/5     Precautions: Standard      PTA Visit #: 2/5     Time In: 1:00 PM  Time Out: 1:55 PM   Total Billable Time: 55 minutes - 1:1 physical therapist assistant (2 TE; 2 TA)      SUBJECTIVE     Pt reports: minimal back pain currently.Continues to experience low back pain when standing for longer periods cooking.    She was not compliant with home exercise program.  Response to previous treatment: decreased pain   Functional change: Ongoing    Pain: 2/10 end of session  Location: low back    OBJECTIVE     Objective Measures updated at progress report unless specified.     Treatment     Ines received the treatments listed below:        therapeutic exercises to develop strength, endurance, ROM, flexibility, posture, and core stabilization for 15 minutes 1:1 physical therapist assistant,  including:  Straight arm pull downs; red theraband - 2x10   Rows: green theraband - 3x10   Low rows: red theraband -  "2x10  Standing open books: 15x bilateral   Lateral band walks: red theraband - 3 laps in open gym NP out of time  Seated thoracic extension mobility with sm marfred bolster behind back 20x      therapeutic activities to improve functional performance for 25 minutes, including:  Walking  3 laps + 3 laps as functional activity activity tolerance with 4# DB bilateral    Step ups: 6" blue step - 20x no upper extremity support  Lateral steps overs: 6" step - 2x10 reps   Narrow stance on black foam board with chest press: 4 lb dumbbell   Sit to stand from chair: 2x10 reps  red theraband above knees and 4lb dumbbell   Simulated mopping technique with cueing for body mechanics and neutral spine 2 minutes  NP out of time  Simulated sweeping  technique with cueing for body mechanics and neutral spine 2 minutes NP out of time    neuromuscular re-education activities to improve: Balance, Coordination, Kinesthetic, Sense, Proprioception, and Posture for 15 minutes. The following activities were included:   Standing on Airex foam in narrow base of support with horizontal head turns 1 minutes CGA NP out of time    Standing on Airex foam in narrow base of support with vertical head nods  1 minutes CGA  NP out of time   Ambulation 150 feet with  horizontal head turns,  CGA   Ambulation 150 feet in open moderately busy gym environment with  vertical head nods CGA   Patient Education and Home Exercises     Home Exercises Provided and Patient Education Provided     Education provided:   - Necessity of home exercise program compliance    Written Home Exercises Provided: Patient instructed to cont prior HEP. Exercises were reviewed and Ines was able to demonstrate them prior to the end of the session.  Ines demonstrated good  understanding of the education provided. See EMR under Patient Instructions for exercises provided during therapy sessions    ASSESSMENT   Nasima is a 72 y.o. female referred to outpatient Physical Therapy with a " medical diagnosis of M54.50,G89.29 (ICD-10-CM) - Chronic bilateral low back pain without sciatica and M51.36 (ICD-10-CM) - DDD (degenerative disc disease), lumbar. Patient presents with signs and symptoms consistent with low back pain with mobility deficits. Presents with continued low levels of low back pain which is worst when she stands for longer periods of time cooking. She also continues to grimace during transitions supine to sit etc... Reviewed log roll technique again.  Continued standing exercises focusing on posterior chain and gluteus complex strengthening. Very difficult to assess exercise tolerance due to poor feedback. Needs supervision for effective exercise and stretch tech technique. Improved tolerance to standing activities. Continued balance training as she feels unstable at times.     Ines Is progressing well towards her goals.   Pt prognosis is Good.     Pt will continue to benefit from skilled outpatient physical therapy to address the deficits listed in the problem list box on initial evaluation, provide pt/family education and to maximize pt's level of independence in the home and community environment.     Pt's spiritual, cultural and educational needs considered and pt agreeable to plan of care and goals.     Anticipated barriers to physical therapy: language barrier; poor historian    Goals:   Short Term Goals (4 Weeks):  1. Patient will be compliant with home exercise program to supplement therapy in promoting functional mobility.  2. Patient will perform transverse abdominus contraction with good control to demonstrate improved core strength.  3. Patient will report no pain during thoracolumbar active range of motion to promote functional mobility.  4. Patient will improve impaired lower extremity myotomes/manual muscle tests  to >/=4/5 to improve strength for functional tasks.     Long Term Goals (8 Weeks):   1. Patient will improve FOTO score to </= 29% limited to decrease perceived  limitation with maintaining/changing body position.   2. Patient will report pain at rest to </= 1/10 to improve quality of life.  3. Patient will improve impaired lower extremity myotomes/manual muscle tests to >/=4+/5 to improve strength for functional tasks.  4. Patient will improve TUG score to </=10.5 seconds to decrease falls risk in community.  5. Patient will improve 30 second sit to stand score to >/= to 14 repetitions to increase lower extremity strength for functional transitional activities.     PLAN   Posterior chain and gluteus strengthening   Functional strengthening and activity tolerance   Walking endurance     Tejinder Tafoya, PTA

## 2023-03-31 ENCOUNTER — CLINICAL SUPPORT (OUTPATIENT)
Dept: REHABILITATION | Facility: HOSPITAL | Age: 73
End: 2023-03-31
Payer: MEDICARE

## 2023-03-31 DIAGNOSIS — Z74.09 DECREASED STRENGTH, ENDURANCE, AND MOBILITY: ICD-10-CM

## 2023-03-31 DIAGNOSIS — R68.89 DECREASED STRENGTH, ENDURANCE, AND MOBILITY: ICD-10-CM

## 2023-03-31 DIAGNOSIS — R53.1 DECREASED STRENGTH, ENDURANCE, AND MOBILITY: ICD-10-CM

## 2023-03-31 DIAGNOSIS — M53.86 DECREASED RANGE OF MOTION OF LUMBAR SPINE: Primary | ICD-10-CM

## 2023-03-31 PROCEDURE — 97110 THERAPEUTIC EXERCISES: CPT | Mod: PN

## 2023-03-31 PROCEDURE — 97530 THERAPEUTIC ACTIVITIES: CPT | Mod: PN

## 2023-03-31 NOTE — PROGRESS NOTES
[facility]   Physical Therapy Treatment Note     Name: Nasima Pacheco  [facility] Number: [id number]    Therapy Diagnosis:   Encounter Diagnoses   Name Primary?    Decreased range of motion of lumbar spine Yes    Decreased strength, endurance, and mobility                    Physician: CHAD Luong NP    Visit Date: 3/31/2023    Encounter Diagnoses   Name Primary?    Chronic bilateral low back pain without sciatica      DDD (degenerative disc disease), lumbar      Decreased range of motion of lumbar spine      Decreased strength, endurance, and mobility        Physician: CHAD Luong NP     Physician Orders: PT Eval and Treat   Medical Diagnosis from Referral:   M54.50,G89.29 (ICD-10-CM) - Chronic bilateral low back pain without sciatica   M51.36 (ICD-10-CM) - DDD (degenerative disc disease), lumbar      Evaluation Date: 2/16/2023  Authorization Period Expiration: 2/16/2024  Plan of Care Expiration: 4/13/2023  Progress Note Due: 3/16/2023  Visit # / Visits authorized: 7/20 (+ 2)   FOTO: 6/5     Precautions: Standard      PTA Visit #: 2/5     Time In: 1:00 PM  Time Out: 1:55 PM   Total Billable Time: 30 minutes - 1:1 physical therapist assistant (1 TE; 1 TA)      SUBJECTIVE     Pt reports: some back pain over the past week. Most instances of back pain occurs while standing and cooking. Patient not compliant with home exercise program.    She was not compliant with home exercise program.  Response to previous treatment: reduced pain   Functional change: fluctuation and pain levels     Pain: 5/10; 0/10 end of session  Location: low back    OBJECTIVE     Objective Measures updated at progress report unless specified.     Treatment     Ines received the treatments listed below:        therapeutic exercises to develop strength, endurance, ROM, flexibility, posture, and core stabilization for 12 minutes 1:1 physical therapist assistant,  including:  Straight arm pull downs; red theraband  "- 3x10   Rows: green theraband - 3x10   Low rows: red theraband - 3x10  Standing open books: 15x bilateral   Seated thoracic extension mobility with sm maroon bolster behind back 20x    therapeutic activities to improve functional performance for 15 minutes, including:  Walking  4 laps with 5# DB bilateral  cues for scapular retractions for upright posture   Step ups: 6" blue step - 20x with  Lateral steps overs: 6" step with 4# dumbbells- 2x10   Sit to stands with red theraband and 4 kg yellow ball chest press- 20x       manual therapy techniques: Manual traction were applied to the: lumbar spine for 3 minutes, including:  Manual lumbar distraction (gentle, pain-free)      neuromuscular re-education activities to improve: Balance, Coordination, Kinesthetic, Sense, Proprioception, and Posture for 00 minutes. The following activities were included:  Standing on Airex foam in narrow base of support with horizontal head turns 1 minutes CGA NP out of time   Standing on Airex foam in narrow base of support with vertical head nods  1 minutes CGA  NP out of time   Ambulation 150 feet with  horizontal head turns,  CGA   Ambulation 150 feet in open moderately busy gym environment with  vertical head nods CGA   Patient Education and Home Exercises     Home Exercises Provided and Patient Education Provided     Education provided:   - Necessity of home exercise program compliance    Written Home Exercises Provided: Patient instructed to cont prior HEP. Exercises were reviewed and Ines was able to demonstrate them prior to the end of the session.  Ines demonstrated good  understanding of the education provided. See EMR under Patient Instructions for exercises provided during therapy sessions    ASSESSMENT   Nasima is a 72 y.o. female referred to outpatient Physical Therapy with a medical diagnosis of M54.50,G89.29 (ICD-10-CM) - Chronic bilateral low back pain without sciatica and M51.36 (ICD-10-CM) - DDD (degenerative disc " disease), lumbar. Continued lack of carryover as patient is not compliant with home exercise program. Continues to present to therapy with complaints of low level pain that reaches 0/10 following the end of session. Will continue to progress tolerance to standing activities. Re-educated a patient on importance of compliance and provided standing gross lower extremity strengthening exercise program per patient request. Continued positive response to posterior chain and gluteus strengthening. Attempted lumbar distraction to see if patient's pain improves.    Ines Is progressing well towards her goals.   Pt prognosis is Good.     Pt will continue to benefit from skilled outpatient physical therapy to address the deficits listed in the problem list box on initial evaluation, provide pt/family education and to maximize pt's level of independence in the home and community environment.     Pt's spiritual, cultural and educational needs considered and pt agreeable to plan of care and goals.     Anticipated barriers to physical therapy: language barrier; poor historian    Goals:   Short Term Goals (4 Weeks):  1. Patient will be compliant with home exercise program to supplement therapy in promoting functional mobility.  2. Patient will perform transverse abdominus contraction with good control to demonstrate improved core strength.  3. Patient will report no pain during thoracolumbar active range of motion to promote functional mobility.  4. Patient will improve impaired lower extremity myotomes/manual muscle tests  to >/=4/5 to improve strength for functional tasks.     Long Term Goals (8 Weeks):   1. Patient will improve FOTO score to </= 29% limited to decrease perceived limitation with maintaining/changing body position.   2. Patient will report pain at rest to </= 1/10 to improve quality of life.  3. Patient will improve impaired lower extremity myotomes/manual muscle tests to >/=4+/5 to improve strength for functional  tasks.  4. Patient will improve TUG score to </=10.5 seconds to decrease falls risk in community.  5. Patient will improve 30 second sit to stand score to >/= to 14 repetitions to increase lower extremity strength for functional transitional activities.     PLAN   Posterior chain and gluteus strengthening   Functional strengthening and activity tolerance, particularly standing activities   Walking endurance     Elizabeth Aparicio, PT

## 2023-04-24 ENCOUNTER — CLINICAL SUPPORT (OUTPATIENT)
Dept: REHABILITATION | Facility: HOSPITAL | Age: 73
End: 2023-04-24
Payer: MEDICARE

## 2023-04-24 DIAGNOSIS — M53.86 DECREASED RANGE OF MOTION OF LUMBAR SPINE: Primary | ICD-10-CM

## 2023-04-24 DIAGNOSIS — R68.89 DECREASED STRENGTH, ENDURANCE, AND MOBILITY: ICD-10-CM

## 2023-04-24 DIAGNOSIS — R53.1 DECREASED STRENGTH, ENDURANCE, AND MOBILITY: ICD-10-CM

## 2023-04-24 DIAGNOSIS — Z74.09 DECREASED STRENGTH, ENDURANCE, AND MOBILITY: ICD-10-CM

## 2023-04-24 PROCEDURE — 97530 THERAPEUTIC ACTIVITIES: CPT | Mod: PN

## 2023-04-24 PROCEDURE — 97110 THERAPEUTIC EXERCISES: CPT | Mod: PN

## 2023-04-24 NOTE — PROGRESS NOTES
[facility]   Physical Therapy Treatment Note / Discharge    Name: Nasima Pacheco  [facility] Number: [id number]    Therapy Diagnosis:1  Encounter Diagnoses   Name Primary?    Decreased range of motion of lumbar spine Yes    Decreased strength, endurance, and mobility      Physician: CHAD Luong NP    Visit Date: 4/24/2023    Encounter Diagnoses   Name Primary?    Chronic bilateral low back pain without sciatica      DDD (degenerative disc disease), lumbar      Decreased range of motion of lumbar spine      Decreased strength, endurance, and mobility        Physician: CHAD Luong NP     Physician Orders: PT Eval and Treat   Medical Diagnosis from Referral:   M54.50,G89.29 (ICD-10-CM) - Chronic bilateral low back pain without sciatica   M51.36 (ICD-10-CM) - DDD (degenerative disc disease), lumbar      Evaluation Date: 2/16/2023  Authorization Period Expiration: 2/16/2024  Plan of Care Expiration: 4/13/2023  Progress Note Due: 3/16/2023  Visit # / Visits authorized: 10/12 (+ 2)   FOTO: completed      Precautions: Standard      PTA Visit #: 2/5     Time In: 3:05 PM  Time Out: 4:00 PM   Total Billable Time: 30 minutes - 1:1 physical therapist assistant (1 TE, 1 TA)      SUBJECTIVE     Pt reports: she has not attended therapy over the past 3-4 weeks secondary to family member getting . Back pain has improved. Only mild pain in the morning. She has not been compliant with home exercise program.     She was not compliant with home exercise program.  Response to previous treatment: reduced pain   Functional change: fluctuation and pain levels     Pain: 2/10; 0/10 end of session  Location: low back    OBJECTIVE     Objective Measures updated at progress report unless specified.     AROM:    Degrees Pain/Dysfunction   Flexion WNL NP  Donavan's Sign: -   Extension WNL  NP   Right Side Bending  bere thigh  NP   Left Side Bending  mid thigh  NP   Right Rotation 75% NP   Left Rotation 75%  NP      Hip AROM:    RLE LLE   Internal Rotation 32 31   External Rotation 31 30         Strength:  RLE   LLE     Hip flexion: 4/5 Hip flexion: 4/5   Hip Abduction: 5/5 Hip abduction: 5/5   Hip extension 5/5 Hip extension 5/5   Knee flexion: 4/5 Knee flexion: 4/5   Knee extension: 4/5 Knee extension: 4/5   Ankle Dorsiflexion: 5/5 Ankle Dorsiflexion: 5/5   Ankle Plantarflexion: 5/5 Ankle Plantarflexion: 5/5     30 STS: 13x  TU.88 seconds     Treatment     Ines received the treatments listed below:        therapeutic exercises to develop strength, endurance, ROM, flexibility, posture, and core stabilization for 25 minutes - 1:1 physical therapist  including:  Straight arm pull downs; red theraband - 3x10   Rows: green theraband - 3x10   Low rows: red theraband - 3x10  Standing open books: 15x bilateral   Seated thoracic extension mobility with sm maroon bolster behind back 20x    Therapeutic activities to improve functional performance for 30 minutes 1:1 physical therapist  , including:  FOTO  Objective tests and measures  Outcome measures  Home exercise program overview   Questions and answers   Discharge     Patient Education and Home Exercises     Home Exercises Provided and Patient Education Provided     Education provided:   - Necessity of home exercise program compliance    Written Home Exercises Provided: Patient instructed to cont prior HEP. Exercises were reviewed and Ines was able to demonstrate them prior to the end of the session.  Ines demonstrated good  understanding of the education provided. See EMR under Patient Instructions for exercises provided during therapy sessions    ASSESSMENT   Nasima is a 72 y.o. female referred to outpatient Physical Therapy with a medical diagnosis of M54.50,G89.29 (ICD-10-CM) - Chronic bilateral low back pain without sciatica and M51.36 (ICD-10-CM) - DDD (degenerative disc disease), lumbar. Presents to session following 3-4 week absence. Patient low back pain levels  have improved. Patient has made improvements in all objective test and measures as well as outcomes measures. Patient agreed to discharge with updated home exercise program as she has met most goals in therapy.     Ines Is progressing well towards her goals.   Pt prognosis is Good.     Pt will continue to benefit from skilled outpatient physical therapy to address the deficits listed in the problem list box on initial evaluation, provide pt/family education and to maximize pt's level of independence in the home and community environment.     Pt's spiritual, cultural and educational needs considered and pt agreeable to plan of care and goals.     Anticipated barriers to physical therapy: language barrier; poor historian    Goals:   Short Term Goals (4 Weeks):  1. Patient will be compliant with home exercise program to supplement therapy in promoting functional mobility. MET 4/24/2023  2. Patient will perform transverse abdominus contraction with good control to demonstrate improved core strength. MET 4/24/2023  3. Patient will report no pain during thoracolumbar active range of motion to promote functional mobility. MET 4/24/2023  4. Patient will improve impaired lower extremity myotomes/manual muscle tests  to >/=4/5 to improve strength for functional tasks. MET 4/24/2023     Long Term Goals (8 Weeks):   1. Patient will improve FOTO score to </= 29% limited to decrease perceived limitation with maintaining/changing body position. NOT MET 4/24/2023  2. Patient will report pain at rest to </= 1/10 to improve quality of life. MET 4/24/2023  3. Patient will improve impaired lower extremity myotomes/manual muscle tests to >/=4+/5 to improve strength for functional tasks. NOT MET 4/23/2023  4. Patient will improve TUG score to </=10.5 seconds to decrease falls risk in community. MET 4/23/2023  5. Patient will improve 30 second sit to stand score to >/= to 14 repetitions to increase lower extremity strength for functional  transitional activities. Partially met (13 reps) 4/24/2023     PLAN   Discharge    Elizabeth Aparicio, PT

## 2023-06-09 ENCOUNTER — OFFICE VISIT (OUTPATIENT)
Dept: OPTOMETRY | Facility: CLINIC | Age: 73
End: 2023-06-09
Payer: MEDICARE

## 2023-06-09 DIAGNOSIS — H52.7 REFRACTIVE ERROR: ICD-10-CM

## 2023-06-09 DIAGNOSIS — R73.03 PREDIABETES: Primary | ICD-10-CM

## 2023-06-09 DIAGNOSIS — H26.493 PCO (POSTERIOR CAPSULAR OPACIFICATION), BILATERAL: ICD-10-CM

## 2023-06-09 DIAGNOSIS — Z96.1 PSEUDOPHAKIA OF BOTH EYES: ICD-10-CM

## 2023-06-09 PROCEDURE — 92015 DETERMINE REFRACTIVE STATE: CPT | Mod: S$GLB,,, | Performed by: OPTOMETRIST

## 2023-06-09 PROCEDURE — 1126F PR PAIN SEVERITY QUANTIFIED, NO PAIN PRESENT: ICD-10-PCS | Mod: CPTII,S$GLB,, | Performed by: OPTOMETRIST

## 2023-06-09 PROCEDURE — 4010F ACE/ARB THERAPY RXD/TAKEN: CPT | Mod: CPTII,S$GLB,, | Performed by: OPTOMETRIST

## 2023-06-09 PROCEDURE — 1159F MED LIST DOCD IN RCRD: CPT | Mod: CPTII,S$GLB,, | Performed by: OPTOMETRIST

## 2023-06-09 PROCEDURE — 4010F PR ACE/ARB THEARPY RXD/TAKEN: ICD-10-PCS | Mod: CPTII,S$GLB,, | Performed by: OPTOMETRIST

## 2023-06-09 PROCEDURE — 1101F PR PT FALLS ASSESS DOC 0-1 FALLS W/OUT INJ PAST YR: ICD-10-PCS | Mod: CPTII,S$GLB,, | Performed by: OPTOMETRIST

## 2023-06-09 PROCEDURE — 99999 PR PBB SHADOW E&M-EST. PATIENT-LVL II: CPT | Mod: PBBFAC,,, | Performed by: OPTOMETRIST

## 2023-06-09 PROCEDURE — 92004 COMPRE OPH EXAM NEW PT 1/>: CPT | Mod: S$GLB,,, | Performed by: OPTOMETRIST

## 2023-06-09 PROCEDURE — 3044F PR MOST RECENT HEMOGLOBIN A1C LEVEL <7.0%: ICD-10-PCS | Mod: CPTII,S$GLB,, | Performed by: OPTOMETRIST

## 2023-06-09 PROCEDURE — 3288F PR FALLS RISK ASSESSMENT DOCUMENTED: ICD-10-PCS | Mod: CPTII,S$GLB,, | Performed by: OPTOMETRIST

## 2023-06-09 PROCEDURE — 3044F HG A1C LEVEL LT 7.0%: CPT | Mod: CPTII,S$GLB,, | Performed by: OPTOMETRIST

## 2023-06-09 PROCEDURE — 92015 PR REFRACTION: ICD-10-PCS | Mod: S$GLB,,, | Performed by: OPTOMETRIST

## 2023-06-09 PROCEDURE — 1126F AMNT PAIN NOTED NONE PRSNT: CPT | Mod: CPTII,S$GLB,, | Performed by: OPTOMETRIST

## 2023-06-09 PROCEDURE — 92004 PR EYE EXAM, NEW PATIENT,COMPREHESV: ICD-10-PCS | Mod: S$GLB,,, | Performed by: OPTOMETRIST

## 2023-06-09 PROCEDURE — 1101F PT FALLS ASSESS-DOCD LE1/YR: CPT | Mod: CPTII,S$GLB,, | Performed by: OPTOMETRIST

## 2023-06-09 PROCEDURE — 3288F FALL RISK ASSESSMENT DOCD: CPT | Mod: CPTII,S$GLB,, | Performed by: OPTOMETRIST

## 2023-06-09 PROCEDURE — 99999 PR PBB SHADOW E&M-EST. PATIENT-LVL II: ICD-10-PCS | Mod: PBBFAC,,, | Performed by: OPTOMETRIST

## 2023-06-09 PROCEDURE — 1159F PR MEDICATION LIST DOCUMENTED IN MEDICAL RECORD: ICD-10-PCS | Mod: CPTII,S$GLB,, | Performed by: OPTOMETRIST

## 2023-06-09 NOTE — PROGRESS NOTES
HPI    CC: Annual Exam and Update Glasses Rx    RHIANNON: 7/10/2019 Dr. Arora    (-) Changes in vision   (-) Pain  (-) Irritation   (+) Itching   (-) Flashes  (-) Floaters  (+) Glasses wearer  (-) CL wearer  (+) Uses eye gtts: Systane Ultra and Systane Complete (Pt states that she   uses them once a day OU, varies between the two brands)     Does patient want a refraction today? Yes    (-) Eye injury  (+) Eye surgery: Cataract with IOL OU  (+)POHx: Cataracts OU  (-)FOHx    (+)DM, Pre-DM  Hemoglobin A1C       Date                     Value               Ref Range             Status                03/13/2023               5.9 (H)             4.0 - 5.6 %           Final                  09/13/2022               5.7 (H)             4.0 - 5.6 %           Final                  03/29/2022               5.7 (H)             4.0 - 5.6 %           Final                   Last edited by Madyson Chow, OD on 6/9/2023  1:52 PM.            Assessment /Plan     For exam results, see Encounter Report.    Prediabetes    Pseudophakia of both eyes    PCO (posterior capsular opacification), bilateral    Refractive error      No retinopathy noted, OU. Continue proper BS control and annual diabetic eye exams. Monitor yearly.      2-3. PCIOL centered OU. Mild PCO OU. Okay to monitor yearly.      4. Updated SRx. Mild change from previous Mrx. Monitor yearly.        RTC in 1 year for annual eye exam or sooner if needed.

## 2023-06-12 PROBLEM — Z00.00 ANNUAL PHYSICAL EXAM: Status: RESOLVED | Noted: 2023-03-13 | Resolved: 2023-06-12

## 2023-06-12 PROBLEM — Z00.00 HEALTHCARE MAINTENANCE: Status: RESOLVED | Noted: 2022-10-11 | Resolved: 2023-06-12

## 2023-08-21 ENCOUNTER — TELEPHONE (OUTPATIENT)
Dept: OPHTHALMOLOGY | Facility: CLINIC | Age: 73
End: 2023-08-21
Payer: MEDICARE

## 2023-08-21 ENCOUNTER — PATIENT MESSAGE (OUTPATIENT)
Dept: OPTOMETRY | Facility: CLINIC | Age: 73
End: 2023-08-21
Payer: MEDICARE

## 2023-08-21 ENCOUNTER — TELEPHONE (OUTPATIENT)
Dept: PRIMARY CARE CLINIC | Facility: CLINIC | Age: 73
End: 2023-08-21
Payer: MEDICARE

## 2023-08-21 NOTE — TELEPHONE ENCOUNTER
"----- Message from Petey Koroma sent at 8/21/2023  9:27 AM CDT -----  Scheduling Request        Patient Status: Est      Scheduling Appt: Bursted blood vessel (redness level 6 out of 10)      Time/Date Preference: Today      Relationship to Patient?:  Cooper Pacheco (Spouse)       Contact Preference?:  [phone] (Mobile)      Treating Provider: Geraldo      Do you feel you need to be seen today? Yes      Additional Notes:  "Thank you for all that you do for our patients"      "

## 2023-08-21 NOTE — TELEPHONE ENCOUNTER
----- Message from Laura Suarez sent at 8/21/2023 11:54 AM CDT -----  Type:  Sooner Apoointment Request    Caller is requesting a sooner appointment.  Caller declined first available appointment listed below.  Caller will not accept being placed on the waitlist and is requesting a message be sent to doctor.  Name of Caller: Pt   When is the first available appointment? 8/23/23  Symptoms: right eye pain and redness  Would the patient rather a call back or a response via [facility]? Call  Best Call Back Number: [phone]  Additional Information:

## 2023-08-22 ENCOUNTER — OFFICE VISIT (OUTPATIENT)
Dept: PRIMARY CARE CLINIC | Facility: CLINIC | Age: 73
End: 2023-08-22
Payer: MEDICARE

## 2023-08-22 VITALS
DIASTOLIC BLOOD PRESSURE: 82 MMHG | HEART RATE: 83 BPM | SYSTOLIC BLOOD PRESSURE: 140 MMHG | BODY MASS INDEX: 27.09 KG/M2 | HEIGHT: 60 IN | WEIGHT: 138 LBS | OXYGEN SATURATION: 98 %

## 2023-08-22 DIAGNOSIS — H11.31 SUBCONJUNCTIVAL HEMORRHAGE OF RIGHT EYE: Primary | ICD-10-CM

## 2023-08-22 PROCEDURE — 3079F DIAST BP 80-89 MM HG: CPT | Mod: CPTII,S$GLB,, | Performed by: STUDENT IN AN ORGANIZED HEALTH CARE EDUCATION/TRAINING PROGRAM

## 2023-08-22 PROCEDURE — 99213 PR OFFICE/OUTPT VISIT, EST, LEVL III, 20-29 MIN: ICD-10-PCS | Mod: S$GLB,,, | Performed by: STUDENT IN AN ORGANIZED HEALTH CARE EDUCATION/TRAINING PROGRAM

## 2023-08-22 PROCEDURE — 3079F PR MOST RECENT DIASTOLIC BLOOD PRESSURE 80-89 MM HG: ICD-10-PCS | Mod: CPTII,S$GLB,, | Performed by: STUDENT IN AN ORGANIZED HEALTH CARE EDUCATION/TRAINING PROGRAM

## 2023-08-22 PROCEDURE — 99999 PR PBB SHADOW E&M-EST. PATIENT-LVL IV: ICD-10-PCS | Mod: PBBFAC,,, | Performed by: STUDENT IN AN ORGANIZED HEALTH CARE EDUCATION/TRAINING PROGRAM

## 2023-08-22 PROCEDURE — 3077F PR MOST RECENT SYSTOLIC BLOOD PRESSURE >= 140 MM HG: ICD-10-PCS | Mod: CPTII,S$GLB,, | Performed by: STUDENT IN AN ORGANIZED HEALTH CARE EDUCATION/TRAINING PROGRAM

## 2023-08-22 PROCEDURE — 1160F RVW MEDS BY RX/DR IN RCRD: CPT | Mod: CPTII,S$GLB,, | Performed by: STUDENT IN AN ORGANIZED HEALTH CARE EDUCATION/TRAINING PROGRAM

## 2023-08-22 PROCEDURE — 1101F PR PT FALLS ASSESS DOC 0-1 FALLS W/OUT INJ PAST YR: ICD-10-PCS | Mod: CPTII,S$GLB,, | Performed by: STUDENT IN AN ORGANIZED HEALTH CARE EDUCATION/TRAINING PROGRAM

## 2023-08-22 PROCEDURE — 99213 OFFICE O/P EST LOW 20 MIN: CPT | Mod: S$GLB,,, | Performed by: STUDENT IN AN ORGANIZED HEALTH CARE EDUCATION/TRAINING PROGRAM

## 2023-08-22 PROCEDURE — 4010F ACE/ARB THERAPY RXD/TAKEN: CPT | Mod: CPTII,S$GLB,, | Performed by: STUDENT IN AN ORGANIZED HEALTH CARE EDUCATION/TRAINING PROGRAM

## 2023-08-22 PROCEDURE — 99999 PR PBB SHADOW E&M-EST. PATIENT-LVL IV: CPT | Mod: PBBFAC,,, | Performed by: STUDENT IN AN ORGANIZED HEALTH CARE EDUCATION/TRAINING PROGRAM

## 2023-08-22 PROCEDURE — 3044F PR MOST RECENT HEMOGLOBIN A1C LEVEL <7.0%: ICD-10-PCS | Mod: CPTII,S$GLB,, | Performed by: STUDENT IN AN ORGANIZED HEALTH CARE EDUCATION/TRAINING PROGRAM

## 2023-08-22 PROCEDURE — 3288F PR FALLS RISK ASSESSMENT DOCUMENTED: ICD-10-PCS | Mod: CPTII,S$GLB,, | Performed by: STUDENT IN AN ORGANIZED HEALTH CARE EDUCATION/TRAINING PROGRAM

## 2023-08-22 PROCEDURE — 1125F AMNT PAIN NOTED PAIN PRSNT: CPT | Mod: CPTII,S$GLB,, | Performed by: STUDENT IN AN ORGANIZED HEALTH CARE EDUCATION/TRAINING PROGRAM

## 2023-08-22 PROCEDURE — 3008F BODY MASS INDEX DOCD: CPT | Mod: CPTII,S$GLB,, | Performed by: STUDENT IN AN ORGANIZED HEALTH CARE EDUCATION/TRAINING PROGRAM

## 2023-08-22 PROCEDURE — 1159F PR MEDICATION LIST DOCUMENTED IN MEDICAL RECORD: ICD-10-PCS | Mod: CPTII,S$GLB,, | Performed by: STUDENT IN AN ORGANIZED HEALTH CARE EDUCATION/TRAINING PROGRAM

## 2023-08-22 PROCEDURE — 1160F PR REVIEW ALL MEDS BY PRESCRIBER/CLIN PHARMACIST DOCUMENTED: ICD-10-PCS | Mod: CPTII,S$GLB,, | Performed by: STUDENT IN AN ORGANIZED HEALTH CARE EDUCATION/TRAINING PROGRAM

## 2023-08-22 PROCEDURE — 3288F FALL RISK ASSESSMENT DOCD: CPT | Mod: CPTII,S$GLB,, | Performed by: STUDENT IN AN ORGANIZED HEALTH CARE EDUCATION/TRAINING PROGRAM

## 2023-08-22 PROCEDURE — 4010F PR ACE/ARB THEARPY RXD/TAKEN: ICD-10-PCS | Mod: CPTII,S$GLB,, | Performed by: STUDENT IN AN ORGANIZED HEALTH CARE EDUCATION/TRAINING PROGRAM

## 2023-08-22 PROCEDURE — 3044F HG A1C LEVEL LT 7.0%: CPT | Mod: CPTII,S$GLB,, | Performed by: STUDENT IN AN ORGANIZED HEALTH CARE EDUCATION/TRAINING PROGRAM

## 2023-08-22 PROCEDURE — 3008F PR BODY MASS INDEX (BMI) DOCUMENTED: ICD-10-PCS | Mod: CPTII,S$GLB,, | Performed by: STUDENT IN AN ORGANIZED HEALTH CARE EDUCATION/TRAINING PROGRAM

## 2023-08-22 PROCEDURE — 1159F MED LIST DOCD IN RCRD: CPT | Mod: CPTII,S$GLB,, | Performed by: STUDENT IN AN ORGANIZED HEALTH CARE EDUCATION/TRAINING PROGRAM

## 2023-08-22 PROCEDURE — 1101F PT FALLS ASSESS-DOCD LE1/YR: CPT | Mod: CPTII,S$GLB,, | Performed by: STUDENT IN AN ORGANIZED HEALTH CARE EDUCATION/TRAINING PROGRAM

## 2023-08-22 PROCEDURE — 1125F PR PAIN SEVERITY QUANTIFIED, PAIN PRESENT: ICD-10-PCS | Mod: CPTII,S$GLB,, | Performed by: STUDENT IN AN ORGANIZED HEALTH CARE EDUCATION/TRAINING PROGRAM

## 2023-08-22 PROCEDURE — 3077F SYST BP >= 140 MM HG: CPT | Mod: CPTII,S$GLB,, | Performed by: STUDENT IN AN ORGANIZED HEALTH CARE EDUCATION/TRAINING PROGRAM

## 2023-08-31 NOTE — PROGRESS NOTES
INTERNAL MEDICINE SAME DAY PRIMARY CARE VISIT NOTE    Subjective:     Chief Complaint: Eye Pain       Patient ID: Nasima Pacheco is a 73 y.o. female , here today for focused same-day primary care visit.    Today, patient with complaint of eye redness.    Started two days ago.  +itchy eyes  No eye discharge  No eye pain or blurry vision  No injuries to the eye  No recent illnesses    Past Medical History:  Past Medical History:   Diagnosis Date    Arthritis     Cataract     Hyperlipidemia     Hypertension        Home Medications:  Prior to Admission medications    Medication Sig Start Date End Date Taking? Authorizing Provider   amlodipine-benazepril 10-20mg (LOTREL) 10-20 mg per capsule Take 1 capsule by mouth once daily. 9/13/22 9/13/23 Yes Suellen Rao MD   atorvastatin (LIPITOR) 10 MG tablet Take 1 tablet (10 mg total) by mouth once daily. 9/13/22 9/13/23 Yes Suellen Rao MD   calcium-vitamin D3 (OS-MOLLY 500 + D3) 500 mg-5 mcg (200 unit) per tablet Take 1 tablet by mouth 2 (two) times daily with meals. 9/13/22 9/13/23 Yes Suellen Rao MD   diphth,pertus,acell,,tetanus (BOOSTRIX TDAP) 2.5-8-5 Lf-mcg-Lf/0.5mL Syrg injection Inject into the muscle. 6/6/23  Yes Claudia Monique, PharmD   flu vac 2022 65up-ayfEX15L,PF, (FLUAD QUAD 2022-23,65Y UP,,PF,) 60 mcg (15 mcg x 4)/0.5 mL Syrg Inject into the muscle. 10/21/22  Yes Luther Mcguire, PharmD   gabapentin (NEURONTIN) 100 MG capsule Take 2 capsules (200 mg total) by mouth every evening. 1/26/23 1/26/24 Yes CHAD Luong, WOOD   hydroCHLOROthiazide (HYDRODIURIL) 12.5 MG Tab Take 1 tablet (12.5 mg total) by mouth once daily. 9/13/22 9/13/23 Yes Suellen Rao MD   meclizine (ANTIVERT) 12.5 mg tablet Take 1 tablet (12.5 mg total) by mouth 3 (three) times daily as needed for Dizziness. 9/13/22  Yes Suellen Rao MD   sertraline (ZOLOFT) 25 MG tablet Take 1 tablet (25 mg total) by mouth once daily. 3/13/23 9/9/23 Yes Suellen Rao MD        Allergies:  Review of patient's allergies indicates:  No Known Allergies    Social History:  Social History     Tobacco Use    Smoking status: Never    Smokeless tobacco: Never   Substance Use Topics    Alcohol use: No    Drug use: Never         Review of Systems   Constitutional:  Negative for diaphoresis, fatigue and fever.   HENT:  Negative for congestion, ear pain, rhinorrhea, sinus pain, sneezing, sore throat and voice change.    Eyes:  Positive for redness and itching. Negative for discharge.   Respiratory:  Negative for shortness of breath and wheezing.    Cardiovascular:  Negative for chest pain.   Gastrointestinal:  Negative for abdominal pain.   Skin:  Negative for rash.   Neurological:  Negative for weakness.           Objective:   BP (!) 140/82 (BP Location: Left arm, Patient Position: Sitting, BP Method: Medium (Manual))   Pulse 83   Ht 5' (1.524 m)   Wt 62.6 kg (138 lb 0.1 oz)   SpO2 98%   BMI 26.95 kg/m²        General: AAO x3, no apparent distress  HEENT: PERRL, OP clear, + R eye subconjunctival hemorrhage  CV: RRR, no m/r/g  Pulm: Lungs CTAB, no crackles, no wheezes  Abd: s/NT/ND +BS  Extremities: no c/c/e    Labs:         Assessment/Plan     Nasima was seen today for eye pain.    Diagnoses and all orders for this visit:    Subconjunctival hemorrhage of right eye    Apply col compress to the eye prn.  OTC eye drops prn    RTC prn and with PCP as per routine.    Linda Isaacs MD

## 2023-09-05 ENCOUNTER — OFFICE VISIT (OUTPATIENT)
Dept: INTERNAL MEDICINE | Facility: CLINIC | Age: 73
End: 2023-09-05
Payer: MEDICARE

## 2023-09-05 VITALS
HEIGHT: 60 IN | HEART RATE: 80 BPM | WEIGHT: 136.69 LBS | DIASTOLIC BLOOD PRESSURE: 83 MMHG | BODY MASS INDEX: 26.84 KG/M2 | SYSTOLIC BLOOD PRESSURE: 158 MMHG

## 2023-09-05 DIAGNOSIS — I70.0 ATHEROSCLEROSIS OF AORTA: ICD-10-CM

## 2023-09-05 DIAGNOSIS — D17.30 LIPOMA OF SKIN: ICD-10-CM

## 2023-09-05 DIAGNOSIS — I10 PRIMARY HYPERTENSION: Primary | ICD-10-CM

## 2023-09-05 DIAGNOSIS — R92.2 INCONCLUSIVE MAMMOGRAM: ICD-10-CM

## 2023-09-05 DIAGNOSIS — F32.0 MILD MAJOR DEPRESSION, SINGLE EPISODE: ICD-10-CM

## 2023-09-05 DIAGNOSIS — Z78.0 ASYMPTOMATIC MENOPAUSAL STATE: ICD-10-CM

## 2023-09-05 PROCEDURE — 3079F PR MOST RECENT DIASTOLIC BLOOD PRESSURE 80-89 MM HG: ICD-10-PCS | Mod: CPTII,S$GLB,, | Performed by: STUDENT IN AN ORGANIZED HEALTH CARE EDUCATION/TRAINING PROGRAM

## 2023-09-05 PROCEDURE — 4010F PR ACE/ARB THEARPY RXD/TAKEN: ICD-10-PCS | Mod: CPTII,S$GLB,, | Performed by: STUDENT IN AN ORGANIZED HEALTH CARE EDUCATION/TRAINING PROGRAM

## 2023-09-05 PROCEDURE — 1126F PR PAIN SEVERITY QUANTIFIED, NO PAIN PRESENT: ICD-10-PCS | Mod: CPTII,S$GLB,, | Performed by: STUDENT IN AN ORGANIZED HEALTH CARE EDUCATION/TRAINING PROGRAM

## 2023-09-05 PROCEDURE — 3044F HG A1C LEVEL LT 7.0%: CPT | Mod: CPTII,S$GLB,, | Performed by: STUDENT IN AN ORGANIZED HEALTH CARE EDUCATION/TRAINING PROGRAM

## 2023-09-05 PROCEDURE — 3077F PR MOST RECENT SYSTOLIC BLOOD PRESSURE >= 140 MM HG: ICD-10-PCS | Mod: CPTII,S$GLB,, | Performed by: STUDENT IN AN ORGANIZED HEALTH CARE EDUCATION/TRAINING PROGRAM

## 2023-09-05 PROCEDURE — 1159F PR MEDICATION LIST DOCUMENTED IN MEDICAL RECORD: ICD-10-PCS | Mod: CPTII,S$GLB,, | Performed by: STUDENT IN AN ORGANIZED HEALTH CARE EDUCATION/TRAINING PROGRAM

## 2023-09-05 PROCEDURE — 1101F PR PT FALLS ASSESS DOC 0-1 FALLS W/OUT INJ PAST YR: ICD-10-PCS | Mod: CPTII,S$GLB,, | Performed by: STUDENT IN AN ORGANIZED HEALTH CARE EDUCATION/TRAINING PROGRAM

## 2023-09-05 PROCEDURE — 3288F FALL RISK ASSESSMENT DOCD: CPT | Mod: CPTII,S$GLB,, | Performed by: STUDENT IN AN ORGANIZED HEALTH CARE EDUCATION/TRAINING PROGRAM

## 2023-09-05 PROCEDURE — 4010F ACE/ARB THERAPY RXD/TAKEN: CPT | Mod: CPTII,S$GLB,, | Performed by: STUDENT IN AN ORGANIZED HEALTH CARE EDUCATION/TRAINING PROGRAM

## 2023-09-05 PROCEDURE — 99999 PR PBB SHADOW E&M-EST. PATIENT-LVL III: ICD-10-PCS | Mod: PBBFAC,,, | Performed by: STUDENT IN AN ORGANIZED HEALTH CARE EDUCATION/TRAINING PROGRAM

## 2023-09-05 PROCEDURE — 1160F PR REVIEW ALL MEDS BY PRESCRIBER/CLIN PHARMACIST DOCUMENTED: ICD-10-PCS | Mod: CPTII,S$GLB,, | Performed by: STUDENT IN AN ORGANIZED HEALTH CARE EDUCATION/TRAINING PROGRAM

## 2023-09-05 PROCEDURE — 3008F BODY MASS INDEX DOCD: CPT | Mod: CPTII,S$GLB,, | Performed by: STUDENT IN AN ORGANIZED HEALTH CARE EDUCATION/TRAINING PROGRAM

## 2023-09-05 PROCEDURE — 99214 PR OFFICE/OUTPT VISIT, EST, LEVL IV, 30-39 MIN: ICD-10-PCS | Mod: S$GLB,,, | Performed by: STUDENT IN AN ORGANIZED HEALTH CARE EDUCATION/TRAINING PROGRAM

## 2023-09-05 PROCEDURE — 1159F MED LIST DOCD IN RCRD: CPT | Mod: CPTII,S$GLB,, | Performed by: STUDENT IN AN ORGANIZED HEALTH CARE EDUCATION/TRAINING PROGRAM

## 2023-09-05 PROCEDURE — 3079F DIAST BP 80-89 MM HG: CPT | Mod: CPTII,S$GLB,, | Performed by: STUDENT IN AN ORGANIZED HEALTH CARE EDUCATION/TRAINING PROGRAM

## 2023-09-05 PROCEDURE — 3077F SYST BP >= 140 MM HG: CPT | Mod: CPTII,S$GLB,, | Performed by: STUDENT IN AN ORGANIZED HEALTH CARE EDUCATION/TRAINING PROGRAM

## 2023-09-05 PROCEDURE — 1126F AMNT PAIN NOTED NONE PRSNT: CPT | Mod: CPTII,S$GLB,, | Performed by: STUDENT IN AN ORGANIZED HEALTH CARE EDUCATION/TRAINING PROGRAM

## 2023-09-05 PROCEDURE — 99214 OFFICE O/P EST MOD 30 MIN: CPT | Mod: S$GLB,,, | Performed by: STUDENT IN AN ORGANIZED HEALTH CARE EDUCATION/TRAINING PROGRAM

## 2023-09-05 PROCEDURE — 99999 PR PBB SHADOW E&M-EST. PATIENT-LVL III: CPT | Mod: PBBFAC,,, | Performed by: STUDENT IN AN ORGANIZED HEALTH CARE EDUCATION/TRAINING PROGRAM

## 2023-09-05 PROCEDURE — 3008F PR BODY MASS INDEX (BMI) DOCUMENTED: ICD-10-PCS | Mod: CPTII,S$GLB,, | Performed by: STUDENT IN AN ORGANIZED HEALTH CARE EDUCATION/TRAINING PROGRAM

## 2023-09-05 PROCEDURE — 1101F PT FALLS ASSESS-DOCD LE1/YR: CPT | Mod: CPTII,S$GLB,, | Performed by: STUDENT IN AN ORGANIZED HEALTH CARE EDUCATION/TRAINING PROGRAM

## 2023-09-05 PROCEDURE — 1160F RVW MEDS BY RX/DR IN RCRD: CPT | Mod: CPTII,S$GLB,, | Performed by: STUDENT IN AN ORGANIZED HEALTH CARE EDUCATION/TRAINING PROGRAM

## 2023-09-05 PROCEDURE — 3288F PR FALLS RISK ASSESSMENT DOCUMENTED: ICD-10-PCS | Mod: CPTII,S$GLB,, | Performed by: STUDENT IN AN ORGANIZED HEALTH CARE EDUCATION/TRAINING PROGRAM

## 2023-09-05 PROCEDURE — 3044F PR MOST RECENT HEMOGLOBIN A1C LEVEL <7.0%: ICD-10-PCS | Mod: CPTII,S$GLB,, | Performed by: STUDENT IN AN ORGANIZED HEALTH CARE EDUCATION/TRAINING PROGRAM

## 2023-09-05 RX ORDER — TELMISARTAN AND HYDROCHLORTHIAZIDE 40; 12.5 MG/1; MG/1
1 TABLET ORAL DAILY
Qty: 90 TABLET | Refills: 3 | Status: SHIPPED | OUTPATIENT
Start: 2023-09-05 | End: 2023-09-05

## 2023-09-05 RX ORDER — TELMISARTAN AND HYDROCHLORTHIAZIDE 80; 25 MG/1; MG/1
1 TABLET ORAL DAILY
Qty: 90 TABLET | Refills: 3 | Status: SHIPPED | OUTPATIENT
Start: 2023-09-05 | End: 2023-09-05 | Stop reason: CLARIF

## 2023-09-05 RX ORDER — TELMISARTAN AND HYDROCHLORTHIAZIDE 40; 12.5 MG/1; MG/1
1 TABLET ORAL DAILY
Qty: 90 TABLET | Refills: 3 | Status: SHIPPED | OUTPATIENT
Start: 2023-09-05 | End: 2023-10-02

## 2023-09-05 NOTE — ASSESSMENT & PLAN NOTE
Uncontrolled.  Elevated SBP  Patient is having side effects due to amlodipine (ankle and feet edema)  Will DC amlodipine-benazepril  DC HCTZ    Will start patient on telmisartan 40 + HCTZ 12.5    Continue DASH diet

## 2023-09-22 RX ORDER — HYDROCHLOROTHIAZIDE 12.5 MG/1
12.5 CAPSULE ORAL DAILY
Qty: 30 CAPSULE | Refills: 1 | Status: SHIPPED | OUTPATIENT
Start: 2023-09-22 | End: 2023-10-02

## 2023-09-22 RX ORDER — HYDROCHLOROTHIAZIDE 12.5 MG/1
12.5 CAPSULE ORAL DAILY
COMMUNITY
Start: 2023-09-22 | End: 2023-09-22 | Stop reason: SDUPTHER

## 2023-10-02 ENCOUNTER — OFFICE VISIT (OUTPATIENT)
Dept: INTERNAL MEDICINE | Facility: CLINIC | Age: 73
End: 2023-10-02
Payer: MEDICARE

## 2023-10-02 VITALS
DIASTOLIC BLOOD PRESSURE: 81 MMHG | SYSTOLIC BLOOD PRESSURE: 174 MMHG | HEIGHT: 60 IN | BODY MASS INDEX: 27.14 KG/M2 | HEART RATE: 77 BPM | WEIGHT: 138.25 LBS

## 2023-10-02 DIAGNOSIS — I10 PRIMARY HYPERTENSION: ICD-10-CM

## 2023-10-02 PROCEDURE — 4010F ACE/ARB THERAPY RXD/TAKEN: CPT | Mod: CPTII,S$GLB,, | Performed by: STUDENT IN AN ORGANIZED HEALTH CARE EDUCATION/TRAINING PROGRAM

## 2023-10-02 PROCEDURE — 3079F PR MOST RECENT DIASTOLIC BLOOD PRESSURE 80-89 MM HG: ICD-10-PCS | Mod: CPTII,S$GLB,, | Performed by: STUDENT IN AN ORGANIZED HEALTH CARE EDUCATION/TRAINING PROGRAM

## 2023-10-02 PROCEDURE — 3288F FALL RISK ASSESSMENT DOCD: CPT | Mod: CPTII,S$GLB,, | Performed by: STUDENT IN AN ORGANIZED HEALTH CARE EDUCATION/TRAINING PROGRAM

## 2023-10-02 PROCEDURE — 3288F PR FALLS RISK ASSESSMENT DOCUMENTED: ICD-10-PCS | Mod: CPTII,S$GLB,, | Performed by: STUDENT IN AN ORGANIZED HEALTH CARE EDUCATION/TRAINING PROGRAM

## 2023-10-02 PROCEDURE — 1160F PR REVIEW ALL MEDS BY PRESCRIBER/CLIN PHARMACIST DOCUMENTED: ICD-10-PCS | Mod: CPTII,S$GLB,, | Performed by: STUDENT IN AN ORGANIZED HEALTH CARE EDUCATION/TRAINING PROGRAM

## 2023-10-02 PROCEDURE — 1101F PT FALLS ASSESS-DOCD LE1/YR: CPT | Mod: CPTII,S$GLB,, | Performed by: STUDENT IN AN ORGANIZED HEALTH CARE EDUCATION/TRAINING PROGRAM

## 2023-10-02 PROCEDURE — 3008F PR BODY MASS INDEX (BMI) DOCUMENTED: ICD-10-PCS | Mod: CPTII,S$GLB,, | Performed by: STUDENT IN AN ORGANIZED HEALTH CARE EDUCATION/TRAINING PROGRAM

## 2023-10-02 PROCEDURE — 1126F AMNT PAIN NOTED NONE PRSNT: CPT | Mod: CPTII,S$GLB,, | Performed by: STUDENT IN AN ORGANIZED HEALTH CARE EDUCATION/TRAINING PROGRAM

## 2023-10-02 PROCEDURE — 1101F PR PT FALLS ASSESS DOC 0-1 FALLS W/OUT INJ PAST YR: ICD-10-PCS | Mod: CPTII,S$GLB,, | Performed by: STUDENT IN AN ORGANIZED HEALTH CARE EDUCATION/TRAINING PROGRAM

## 2023-10-02 PROCEDURE — 1126F PR PAIN SEVERITY QUANTIFIED, NO PAIN PRESENT: ICD-10-PCS | Mod: CPTII,S$GLB,, | Performed by: STUDENT IN AN ORGANIZED HEALTH CARE EDUCATION/TRAINING PROGRAM

## 2023-10-02 PROCEDURE — 3077F SYST BP >= 140 MM HG: CPT | Mod: CPTII,S$GLB,, | Performed by: STUDENT IN AN ORGANIZED HEALTH CARE EDUCATION/TRAINING PROGRAM

## 2023-10-02 PROCEDURE — 3077F PR MOST RECENT SYSTOLIC BLOOD PRESSURE >= 140 MM HG: ICD-10-PCS | Mod: CPTII,S$GLB,, | Performed by: STUDENT IN AN ORGANIZED HEALTH CARE EDUCATION/TRAINING PROGRAM

## 2023-10-02 PROCEDURE — 1159F MED LIST DOCD IN RCRD: CPT | Mod: CPTII,S$GLB,, | Performed by: STUDENT IN AN ORGANIZED HEALTH CARE EDUCATION/TRAINING PROGRAM

## 2023-10-02 PROCEDURE — 4010F PR ACE/ARB THEARPY RXD/TAKEN: ICD-10-PCS | Mod: CPTII,S$GLB,, | Performed by: STUDENT IN AN ORGANIZED HEALTH CARE EDUCATION/TRAINING PROGRAM

## 2023-10-02 PROCEDURE — 3044F PR MOST RECENT HEMOGLOBIN A1C LEVEL <7.0%: ICD-10-PCS | Mod: CPTII,S$GLB,, | Performed by: STUDENT IN AN ORGANIZED HEALTH CARE EDUCATION/TRAINING PROGRAM

## 2023-10-02 PROCEDURE — 1160F RVW MEDS BY RX/DR IN RCRD: CPT | Mod: CPTII,S$GLB,, | Performed by: STUDENT IN AN ORGANIZED HEALTH CARE EDUCATION/TRAINING PROGRAM

## 2023-10-02 PROCEDURE — 1159F PR MEDICATION LIST DOCUMENTED IN MEDICAL RECORD: ICD-10-PCS | Mod: CPTII,S$GLB,, | Performed by: STUDENT IN AN ORGANIZED HEALTH CARE EDUCATION/TRAINING PROGRAM

## 2023-10-02 PROCEDURE — 3008F BODY MASS INDEX DOCD: CPT | Mod: CPTII,S$GLB,, | Performed by: STUDENT IN AN ORGANIZED HEALTH CARE EDUCATION/TRAINING PROGRAM

## 2023-10-02 PROCEDURE — 99999 PR PBB SHADOW E&M-EST. PATIENT-LVL IV: CPT | Mod: PBBFAC,,, | Performed by: STUDENT IN AN ORGANIZED HEALTH CARE EDUCATION/TRAINING PROGRAM

## 2023-10-02 PROCEDURE — 3044F HG A1C LEVEL LT 7.0%: CPT | Mod: CPTII,S$GLB,, | Performed by: STUDENT IN AN ORGANIZED HEALTH CARE EDUCATION/TRAINING PROGRAM

## 2023-10-02 PROCEDURE — 3079F DIAST BP 80-89 MM HG: CPT | Mod: CPTII,S$GLB,, | Performed by: STUDENT IN AN ORGANIZED HEALTH CARE EDUCATION/TRAINING PROGRAM

## 2023-10-02 PROCEDURE — 99213 OFFICE O/P EST LOW 20 MIN: CPT | Mod: S$GLB,,, | Performed by: STUDENT IN AN ORGANIZED HEALTH CARE EDUCATION/TRAINING PROGRAM

## 2023-10-02 PROCEDURE — 99999 PR PBB SHADOW E&M-EST. PATIENT-LVL IV: ICD-10-PCS | Mod: PBBFAC,,, | Performed by: STUDENT IN AN ORGANIZED HEALTH CARE EDUCATION/TRAINING PROGRAM

## 2023-10-02 PROCEDURE — 99213 PR OFFICE/OUTPT VISIT, EST, LEVL III, 20-29 MIN: ICD-10-PCS | Mod: S$GLB,,, | Performed by: STUDENT IN AN ORGANIZED HEALTH CARE EDUCATION/TRAINING PROGRAM

## 2023-10-02 RX ORDER — TELMISARTAN AND HYDROCHLORTHIAZIDE 80; 25 MG/1; MG/1
1 TABLET ORAL DAILY
Qty: 90 TABLET | Refills: 3 | Status: SHIPPED | OUTPATIENT
Start: 2023-10-02 | End: 2024-03-21 | Stop reason: SDUPTHER

## 2023-10-02 NOTE — ASSESSMENT & PLAN NOTE
Uncontrolled.  Will increase dose of medication to  Telmisartan + HCTZ 80/25 mg qd    Continue DASH diet, physical activity.

## 2023-10-02 NOTE — PROGRESS NOTES
Subjective:       Patient ID: Nasima Pacheco is a 73 y.o. female.    Chief Complaint: Follow-up (B/p)    HPI    Nasima Pacheco is a 73 y.o. female , [place] speaking, with a history of:  HTN  HLD  BPPV  OA  Back pain     [Local Patient]  Originally from [place]  Lives in: [place] [address]       Patient comes to the clinic for blood pressure follow up.    She reports that after changing her medications her BLLE swelling has improved.  She brings BP log that shows BP readings between 120-160/70-90.    She had called the office and we instructed for her to take an additional dose of HCTZ, after that her BP improved slightly.    She has also has headaches, occipital, which coincide with the elevated BP readings.    No chest pain, palpitations or SOB.    No other symptoms or complaints.      Since last seen by me:      09/05/23 HTN - dc amlodipine, started patient on Micardis at low dose.    Changes in health or medications: No    Specialists visits and recommendations:        H/o ER visits:   NO    H/o Hospitalizations:  NO    H/o falls: None     Life events / lifestyle:   Nothing new      Most recent laboratories reviewed:    Recent Labs   Lab 09/29/21  1017 03/29/22  0957 03/13/23  1013   WBC 5.36 4.75 4.85   Hemoglobin 14.0 14.3 13.3   Hematocrit 40.6 42.4 40.7   MCV 89 92 92   Platelets 238 220 208       Recent Labs   Lab 08/06/21  1647 09/29/21  1017 03/29/22  0957 09/13/22  1426 03/13/23  1013   Glucose 105 101 92 107 105   Sodium 142 142 143 137 143   Potassium 3.9 3.8 4.1 4.0 4.1   BUN 12 13 14 15 11   Creatinine 0.6 0.6 0.7 0.7 0.6   eGFR if non African American >60.0 >60.0 >60.0  --   --    Total Bilirubin 1.2 H 1.0 1.1 H  --  0.7   AST 15 18 17  --  15   ALT 12 15 12  --  15       Recent Labs   Lab 03/29/22  0957 09/13/22  1426 03/13/23  1013   Hemoglobin A1C 5.7 H 5.7 H 5.9 H       Recent Labs   Lab 09/29/21  1017 03/29/22  0957 03/13/23  1013   Cholesterol 172 225 H 171   Triglycerides 75 121 64    HDL 56 57 52   LDL Cholesterol 101.0 143.8 106.2   Non-HDL Cholesterol 116 168 119       Recent Labs   Lab 03/26/21  1007 09/29/21  1017 03/13/23  1013   TSH 2.648 2.283 2.131               Current medications:    Current Outpatient Medications:     gabapentin (NEURONTIN) 100 MG capsule, Take 2 capsules (200 mg total) by mouth every evening., Disp: 60 capsule, Rfl: 11    meclizine (ANTIVERT) 12.5 mg tablet, Take 1 tablet (12.5 mg total) by mouth 3 (three) times daily as needed for Dizziness., Disp: 90 tablet, Rfl: 0    atorvastatin (LIPITOR) 10 MG tablet, Take 1 tablet (10 mg total) by mouth once daily., Disp: 90 tablet, Rfl: 3    calcium-vitamin D3 (OS-MOLLY 500 + D3) 500 mg-5 mcg (200 unit) per tablet, Take 1 tablet by mouth 2 (two) times daily with meals., Disp: 180 tablet, Rfl: 3    diphth,pertus,acell,,tetanus (BOOSTRIX TDAP) 2.5-8-5 Lf-mcg-Lf/0.5mL Syrg injection, Inject into the muscle., Disp: 0.5 mL, Rfl: 0    flu vac 2022 [id number], (FLUAD QUAD 2022-23,65Y UP,,PF,) 60 mcg (15 mcg x 4)/0.5 mL Syrg, Inject into the muscle., Disp: 0.5 mL, Rfl: 0    sertraline (ZOLOFT) 25 MG tablet, Take 1 tablet (25 mg total) by mouth once daily., Disp: 90 tablet, Rfl: 1    telmisartan-hydrochlorothiazide (MICARDIS HCT) 80-25 mg per tablet, Take 1 tablet by mouth once daily., Disp: 90 tablet, Rfl: 3      Healthcare Maintenance:  Colonoscopy: FOBT   Vaccinations: Pneumonia 2016, Zoster (no), Tetanus 2023  COVID vaccination: completed x 2  Depression screening: PHQ2 score = 0     Annual Wellness visit approx. Month: March ROS  11-point review of systems done. Negative except for detailed in the HPI.        Objective:      BP (!) 174/81   Pulse 77   Ht 5' (1.524 m)   Wt 62.7 kg (138 lb 3.7 oz)   BMI 27.00 kg/m²     Physical Exam     Vitals and nursing note reviewed.   Constitutional:       Appearance: Normal appearance.   HENT:      Head: Normocephalic and atraumatic.      Right Ear: Tympanic membrane normal.       Left Ear: Tympanic membrane normal.      Nose: Nose normal.      Mouth/Throat:      Mouth: Mucous membranes are moist.      Pharynx: Oropharynx is clear.   Eyes:      Extraocular Movements: Extraocular movements intact.      Conjunctiva/sclera: Conjunctivae normal.      Pupils: Pupils are equal, round, and reactive to light.   Cardiovascular:      Rate and Rhythm: Normal rate and regular rhythm.      Pulses: Normal pulses.      Heart sounds: Normal heart sounds.   Pulmonary:      Effort: Pulmonary effort is normal.      Breath sounds: Normal breath sounds.   Abdominal:      General: Bowel sounds are normal. There is no distension.      Palpations: Abdomen is soft.      Tenderness: There is no abdominal tenderness.   Musculoskeletal:         General: Normal range of motion.      Cervical back: Normal range of motion and neck supple.   Skin:     General: Skin is warm.   Neurological:      General: No focal deficit present.      Mental Status: She is alert and oriented to person, place, and time. Mental status is at baseline.   Psychiatric:         Mood and Affect: Mood normal.           Assessment:       1. Primary hypertension  Assessment & Plan:  Uncontrolled.  Will increase dose of medication to  Telmisartan + HCTZ 80/25 mg qd    Continue DASH diet, physical activity.    Orders:  -     telmisartan-hydrochlorothiazide (MICARDIS HCT) 80-25 mg per tablet; Take 1 tablet by mouth once daily.  Dispense: 90 tablet; Refill: 3       Plan:       Increased dose of Telmisartan HTCT to 80-25    Patient Instructions   Para el dolor de jimbo puede david:  - Acetaminophen (500 mg o 1 gramo) cada 6 horas  - Aleve (naproxen) 220 mg cada 12 horas si el dolor es muy mar.     Problem list updated, medications reconciled, education provided  Lifestyle recommendations given  AVS printed, explained, and given to the patient.  RTC in : 6 months for BP f/u  1 YEAR FOR AWV            OLGA PHILLIPS MD, MPH  Internal  Medicine  [facility]  [facility]

## 2023-10-02 NOTE — PATIENT INSTRUCTIONS
Para el dolor de jimbo puede david:  - Acetaminophen (500 mg o 1 gramo) cada 6 horas  - Aleve (naproxen) 220 mg cada 12 horas si el dolor es muy mar.

## 2023-10-17 ENCOUNTER — PATIENT MESSAGE (OUTPATIENT)
Dept: CARDIOLOGY | Facility: CLINIC | Age: 73
End: 2023-10-17
Payer: MEDICARE

## 2023-10-18 RX ORDER — AMLODIPINE BESYLATE 5 MG/1
5 TABLET ORAL DAILY
Qty: 30 TABLET | Refills: 11 | Status: SHIPPED | OUTPATIENT
Start: 2023-10-18 | End: 2024-03-21 | Stop reason: SDUPTHER

## 2023-10-30 ENCOUNTER — OFFICE VISIT (OUTPATIENT)
Dept: CARDIOLOGY | Facility: CLINIC | Age: 73
End: 2023-10-30
Payer: MEDICARE

## 2023-10-30 VITALS
SYSTOLIC BLOOD PRESSURE: 118 MMHG | WEIGHT: 137.81 LBS | HEART RATE: 80 BPM | DIASTOLIC BLOOD PRESSURE: 76 MMHG | BODY MASS INDEX: 25.36 KG/M2 | HEIGHT: 62 IN

## 2023-10-30 DIAGNOSIS — E78.2 MIXED HYPERLIPIDEMIA: Primary | ICD-10-CM

## 2023-10-30 DIAGNOSIS — I10 PRIMARY HYPERTENSION: ICD-10-CM

## 2023-10-30 DIAGNOSIS — I70.0 ATHEROSCLEROSIS OF AORTA: ICD-10-CM

## 2023-10-30 PROCEDURE — 99999 PR PBB SHADOW E&M-EST. PATIENT-LVL III: ICD-10-PCS | Mod: PBBFAC,,, | Performed by: INTERNAL MEDICINE

## 2023-10-30 PROCEDURE — 99205 OFFICE O/P NEW HI 60 MIN: CPT | Mod: S$GLB,,, | Performed by: INTERNAL MEDICINE

## 2023-10-30 PROCEDURE — 3008F PR BODY MASS INDEX (BMI) DOCUMENTED: ICD-10-PCS | Mod: CPTII,S$GLB,, | Performed by: INTERNAL MEDICINE

## 2023-10-30 PROCEDURE — 99205 PR OFFICE/OUTPT VISIT, NEW, LEVL V, 60-74 MIN: ICD-10-PCS | Mod: S$GLB,,, | Performed by: INTERNAL MEDICINE

## 2023-10-30 PROCEDURE — 3078F DIAST BP <80 MM HG: CPT | Mod: CPTII,S$GLB,, | Performed by: INTERNAL MEDICINE

## 2023-10-30 PROCEDURE — 3008F BODY MASS INDEX DOCD: CPT | Mod: CPTII,S$GLB,, | Performed by: INTERNAL MEDICINE

## 2023-10-30 PROCEDURE — 3044F PR MOST RECENT HEMOGLOBIN A1C LEVEL <7.0%: ICD-10-PCS | Mod: CPTII,S$GLB,, | Performed by: INTERNAL MEDICINE

## 2023-10-30 PROCEDURE — 3078F PR MOST RECENT DIASTOLIC BLOOD PRESSURE < 80 MM HG: ICD-10-PCS | Mod: CPTII,S$GLB,, | Performed by: INTERNAL MEDICINE

## 2023-10-30 PROCEDURE — 4010F ACE/ARB THERAPY RXD/TAKEN: CPT | Mod: CPTII,S$GLB,, | Performed by: INTERNAL MEDICINE

## 2023-10-30 PROCEDURE — 3074F SYST BP LT 130 MM HG: CPT | Mod: CPTII,S$GLB,, | Performed by: INTERNAL MEDICINE

## 2023-10-30 PROCEDURE — 3074F PR MOST RECENT SYSTOLIC BLOOD PRESSURE < 130 MM HG: ICD-10-PCS | Mod: CPTII,S$GLB,, | Performed by: INTERNAL MEDICINE

## 2023-10-30 PROCEDURE — 1126F PR PAIN SEVERITY QUANTIFIED, NO PAIN PRESENT: ICD-10-PCS | Mod: CPTII,S$GLB,, | Performed by: INTERNAL MEDICINE

## 2023-10-30 PROCEDURE — 4010F PR ACE/ARB THEARPY RXD/TAKEN: ICD-10-PCS | Mod: CPTII,S$GLB,, | Performed by: INTERNAL MEDICINE

## 2023-10-30 PROCEDURE — 1101F PR PT FALLS ASSESS DOC 0-1 FALLS W/OUT INJ PAST YR: ICD-10-PCS | Mod: CPTII,S$GLB,, | Performed by: INTERNAL MEDICINE

## 2023-10-30 PROCEDURE — 1159F MED LIST DOCD IN RCRD: CPT | Mod: CPTII,S$GLB,, | Performed by: INTERNAL MEDICINE

## 2023-10-30 PROCEDURE — 99999 PR PBB SHADOW E&M-EST. PATIENT-LVL III: CPT | Mod: PBBFAC,,, | Performed by: INTERNAL MEDICINE

## 2023-10-30 PROCEDURE — 1101F PT FALLS ASSESS-DOCD LE1/YR: CPT | Mod: CPTII,S$GLB,, | Performed by: INTERNAL MEDICINE

## 2023-10-30 PROCEDURE — 3288F PR FALLS RISK ASSESSMENT DOCUMENTED: ICD-10-PCS | Mod: CPTII,S$GLB,, | Performed by: INTERNAL MEDICINE

## 2023-10-30 PROCEDURE — 1159F PR MEDICATION LIST DOCUMENTED IN MEDICAL RECORD: ICD-10-PCS | Mod: CPTII,S$GLB,, | Performed by: INTERNAL MEDICINE

## 2023-10-30 PROCEDURE — 1126F AMNT PAIN NOTED NONE PRSNT: CPT | Mod: CPTII,S$GLB,, | Performed by: INTERNAL MEDICINE

## 2023-10-30 PROCEDURE — 3044F HG A1C LEVEL LT 7.0%: CPT | Mod: CPTII,S$GLB,, | Performed by: INTERNAL MEDICINE

## 2023-10-30 PROCEDURE — 3288F FALL RISK ASSESSMENT DOCD: CPT | Mod: CPTII,S$GLB,, | Performed by: INTERNAL MEDICINE

## 2023-10-30 NOTE — PROGRESS NOTES
[facility]      Chief Complaint   Patient presents with    Hypertension       Patient ID: Nasima Pacheco is a 73 y.o. female with HLD, HTN, overweight, who presents for initial appointment.  Pertinent history/events are as follows:     -Pt kindly referred by Dr. Rao for evaluation of HTN.    HPI:  Mrs. Pacheco reports elevated blood pressures for the past few weeks.  She was recently started on telmisartan/HCTZ 80-25 mg daily.  Also taking amlodipine 5 mg daily.  Blood pressure today is 118/76.  Reports not taking atorvastatin 10 mg daily as prescribed, but taking it every other day.     Past Medical History:   Diagnosis Date    Arthritis     Cataract     Hyperlipidemia     Hypertension      Past Surgical History:   Procedure Laterality Date    CATARACT EXTRACTION W/  INTRAOCULAR LENS IMPLANT Right 2017    Dr. Mendez    CATARACT EXTRACTION W/  INTRAOCULAR LENS IMPLANT Left 2017    Dr. Mendez     SECTION      EYE SURGERY      HYSTERECTOMY      OOPHORECTOMY       Social History     Socioeconomic History    Marital status:    Tobacco Use    Smoking status: Never    Smokeless tobacco: Never   Substance and Sexual Activity    Alcohol use: No    Drug use: Never    Sexual activity: Not Currently     Partners: Male     Social Determinants of Health     Financial Resource Strain: Low Risk  (2022)    Overall Financial Resource Strain (CARDIA)     Difficulty of Paying Living Expenses: Not hard at all   Food Insecurity: No Food Insecurity (2022)    Hunger Vital Sign     Worried About Running Out of Food in the Last Year: Never true     Ran Out of Food in the Last Year: Never true   Transportation Needs: No Transportation Needs (2022)    PRAPARE - Transportation     Lack of Transportation (Medical): No     Lack of Transportation (Non-Medical): No   Physical Activity: Inactive (2022)    Exercise Vital Sign     Days of Exercise per Week: 0 days     Minutes of  Exercise per Session: 0 min   Stress: No Stress Concern Present (5/24/2022)    [employer] - Occupational Stress Questionnaire     Feeling of Stress : Not at all   Social Connections: Socially Integrated (5/24/2022)    Social Connection and Isolation Panel [NHANES]     Frequency of Communication with Friends and Family: More than three times a week     Frequency of Social Gatherings with Friends and Family: Once a week     Attends Judaism Services: More than 4 times per year     Active Member of Clubs or Organizations: Yes     Attends Club or Organization Meetings: More than 4 times per year     Marital Status:    Housing Stability: Unknown (5/24/2022)    Housing Stability Vital Sign     Unable to Pay for Housing in the Last Year: No     Unstable Housing in the Last Year: No     Family History   Problem Relation Age of Onset    Heart attack Mother     Amblyopia Neg Hx     Blindness Neg Hx     Cataracts Neg Hx     Glaucoma Neg Hx     Macular degeneration Neg Hx     Retinal detachment Neg Hx     Strabismus Neg Hx        Review of patient's allergies indicates:  No Known Allergies    Medication List with Changes/Refills   Current Medications    AMLODIPINE (NORVASC) 5 MG TABLET    Take 1 tablet (5 mg total) by mouth once daily.    ATORVASTATIN (LIPITOR) 10 MG TABLET    Take 1 tablet (10 mg total) by mouth once daily.    CALCIUM-VITAMIN D3 (OS-MOLLY 500 + D3) 500 MG-5 MCG (200 UNIT) PER TABLET    Take 1 tablet by mouth 2 (two) times daily with meals.    MECLIZINE (ANTIVERT) 12.5 MG TABLET    Take 1 tablet (12.5 mg total) by mouth 3 (three) times daily as needed for Dizziness.    TELMISARTAN-HYDROCHLOROTHIAZIDE (MICARDIS HCT) 80-25 MG PER TABLET    Take 1 tablet by mouth once daily.   Discontinued Medications    DIPHTH,PERTUS,ACELL,,TETANUS (BOOSTRIX TDAP) 2.5-8-5 LF-MCG-LF/0.5ML SYRG INJECTION    Inject into the muscle.    FLU VAC 2022 [id number], (FLUAD QUAD 2022-23,65Y UP,,PF,)  "60 MCG (15 MCG X 4)/0.5 ML SYRG    Inject into the muscle.    GABAPENTIN (NEURONTIN) 100 MG CAPSULE    Take 2 capsules (200 mg total) by mouth every evening.    SERTRALINE (ZOLOFT) 25 MG TABLET    Take 1 tablet (25 mg total) by mouth once daily.       Review of Systems  Constitution: Denies chills, fever, and sweats.  HENT: Denies headaches or blurry vision.  Cardiovascular: Denies chest pain or irregular heart beat.  Respiratory: Denies cough or shortness of breath.  Gastrointestinal: Denies abdominal pain, nausea, or vomiting.  Musculoskeletal: Denies muscle cramps.  Neurological: Denies dizziness or focal weakness.  Psychiatric/Behavioral: Normal mental status.  Hematologic/Lymphatic: Denies bleeding problem or easy bruising/bleeding.  Skin: Denies rash or suspicious lesions    Physical Examination  /76   Pulse 80   Ht 5' 2" (1.575 m)   Wt 62.5 kg (137 lb 12.6 oz)   BMI 25.20 kg/m²     Constitutional: No acute distress, conversant  HEENT: Sclera anicteric, Pupils equal, round and reactive to light, extraocular motions intact, Oropharynx clear  Neck: No JVD, no carotid bruits  Cardiovascular: regular rate and rhythm, no murmur, rubs or gallops, normal S1/S2  Pulmonary: Clear to auscultation bilaterally  Abdominal: Abdomen soft, nontender, nondistended, positive bowel sounds  Extremities: No lower extremity edema,   Pulses:  Carotid pulses are 2+ on the right side, and 2+ on the left side.  Radial pulses are 2+ on the right side, and 2+ on the left side.   Femoral pulses are 2+ on the right side, and 2+ on the left side.  Popliteal pulses are 2+ on the right side, and 2+ on the left side.   Dorsalis pedis pulses are 2+ on the right side, and 2+ on the left side.   Posterior tibial pulses are 2+ on the right side, and 2+ on the left side.    Skin: No ecchymosis, erythema, or ulcers  Psych: Alert and oriented x 3, appropriate affect  Neuro: CNII-XII intact, no focal deficits    Labs:  Most Recent Data  CBC: " "  Lab Results   Component Value Date    WBC 4.85 03/13/2023    HGB 13.3 03/13/2023    HCT 40.7 03/13/2023     03/13/2023    MCV 92 03/13/2023    RDW 13.3 03/13/2023     BMP:   Lab Results   Component Value Date     03/13/2023    K 4.1 03/13/2023     03/13/2023    CO2 28 03/13/2023    BUN 11 03/13/2023    CREATININE 0.6 03/13/2023     03/13/2023    CALCIUM 9.4 03/13/2023    MG 2.3 05/08/2017    PHOS 3.5 05/08/2017     LFTS;   Lab Results   Component Value Date    PROT 6.7 03/13/2023    ALBUMIN 4.2 03/13/2023    BILITOT 0.7 03/13/2023    AST 15 03/13/2023    ALKPHOS 127 03/13/2023    ALT 15 03/13/2023     COAGS: No results found for: "INR", "PROTIME", "PTT"  FLP:   Lab Results   Component Value Date    CHOL 171 03/13/2023    HDL 52 03/13/2023    LDLCALC 106.2 03/13/2023    TRIG 64 03/13/2023    CHOLHDL 30.4 03/13/2023     CARDIAC:   Lab Results   Component Value Date    TROPONINI <0.006 12/09/2008       Imaging:    EKG 3/13/2023:  Normal sinus rhythm   Abnormal R wave progression in the precordial leads   Cannot rule out Anterior infarct (age undetermined)    Assessment/Plan:  Nasima Pacheco is a 73 y.o. female with HLD, HTN, overweight, who presents for initial appointment.     HTN- Continue telmisartan/HCTZ 80-25 mg daily and amlodipine 5 mg daily.  Pt to keep log of blood pressure/heart rate and bring in next visit for review.  Pt to limit sodium intake to 2,000 mg daily.    2. HLD- Pt instructed to take atorvastatin 10 mg daily as prescribed.    3. Overweight- Encourage diet, exercise, and weight loss. Pt to walk at least 30 minutes a day/5 days a week.      Follow up in 6 weeks with virtual visit    Total duration of face to face visit time 30 minutes.  Total time spent counseling greater than fifty percent of total visit time.  Counseling included discussion regarding imaging findings, diagnosis, possibilities, treatment options, risks and benefits.  The patient had many questions " regarding the options and long-term effects.    Noe Brown MD, PhD  Interventional Cardiology

## 2023-10-30 NOTE — PATIENT INSTRUCTIONS
Assessment/Plan:  Nasima Pacheco is a 73 y.o. female with HLD, HTN, overweight, who presents for initial appointment.     HTN- Continue telmisartan/HCTZ 80-25 mg daily and amlodipine 5 mg daily.  Pt to keep log of blood pressure/heart rate and bring in next visit for review.  Pt to limit sodium intake to 2,000 mg daily.    2. HLD- Pt instructed to take atorvastatin 10 mg daily as prescribed.    3. Overweight- Encourage diet, exercise, and weight loss. Pt to walk at least 30 minutes a day/5 days a week.      Follow up in 6 weeks with virtual visit

## 2023-11-10 DIAGNOSIS — H81.13 BENIGN PAROXYSMAL POSITIONAL VERTIGO DUE TO BILATERAL VESTIBULAR DISORDER: ICD-10-CM

## 2023-11-10 RX ORDER — MECLIZINE HCL 12.5 MG 12.5 MG/1
TABLET ORAL
Qty: 90 TABLET | Refills: 0 | Status: SHIPPED | OUTPATIENT
Start: 2023-11-10 | End: 2023-11-11 | Stop reason: SDUPTHER

## 2023-11-11 DIAGNOSIS — H81.13 BENIGN PAROXYSMAL POSITIONAL VERTIGO DUE TO BILATERAL VESTIBULAR DISORDER: ICD-10-CM

## 2023-11-13 RX ORDER — MECLIZINE HCL 12.5 MG 12.5 MG/1
12.5 TABLET ORAL 3 TIMES DAILY PRN
Qty: 90 TABLET | Refills: 0 | Status: SHIPPED | OUTPATIENT
Start: 2023-11-13 | End: 2024-03-21 | Stop reason: SDUPTHER

## 2023-11-28 ENCOUNTER — TELEPHONE (OUTPATIENT)
Dept: ADMINISTRATIVE | Facility: HOSPITAL | Age: 73
End: 2023-11-28
Payer: MEDICARE

## 2023-12-08 ENCOUNTER — OFFICE VISIT (OUTPATIENT)
Dept: INTERNAL MEDICINE | Facility: CLINIC | Age: 73
End: 2023-12-08
Payer: MEDICARE

## 2023-12-08 VITALS
BODY MASS INDEX: 24.83 KG/M2 | WEIGHT: 134.94 LBS | HEART RATE: 80 BPM | HEIGHT: 62 IN | DIASTOLIC BLOOD PRESSURE: 75 MMHG | SYSTOLIC BLOOD PRESSURE: 158 MMHG

## 2023-12-08 DIAGNOSIS — I10 PRIMARY HYPERTENSION: Primary | ICD-10-CM

## 2023-12-08 DIAGNOSIS — R73.03 PREDIABETES: ICD-10-CM

## 2023-12-08 DIAGNOSIS — Z00.00 ANNUAL PHYSICAL EXAM: ICD-10-CM

## 2023-12-08 DIAGNOSIS — R79.9 ABNORMAL FINDING OF BLOOD CHEMISTRY, UNSPECIFIED: ICD-10-CM

## 2023-12-08 DIAGNOSIS — E78.5 HYPERLIPIDEMIA, UNSPECIFIED HYPERLIPIDEMIA TYPE: ICD-10-CM

## 2023-12-08 DIAGNOSIS — E78.5 DYSLIPIDEMIA: ICD-10-CM

## 2023-12-08 PROCEDURE — 4010F ACE/ARB THERAPY RXD/TAKEN: CPT | Mod: CPTII,S$GLB,, | Performed by: STUDENT IN AN ORGANIZED HEALTH CARE EDUCATION/TRAINING PROGRAM

## 2023-12-08 PROCEDURE — 1101F PR PT FALLS ASSESS DOC 0-1 FALLS W/OUT INJ PAST YR: ICD-10-PCS | Mod: CPTII,S$GLB,, | Performed by: STUDENT IN AN ORGANIZED HEALTH CARE EDUCATION/TRAINING PROGRAM

## 2023-12-08 PROCEDURE — 1160F PR REVIEW ALL MEDS BY PRESCRIBER/CLIN PHARMACIST DOCUMENTED: ICD-10-PCS | Mod: CPTII,S$GLB,, | Performed by: STUDENT IN AN ORGANIZED HEALTH CARE EDUCATION/TRAINING PROGRAM

## 2023-12-08 PROCEDURE — 1160F RVW MEDS BY RX/DR IN RCRD: CPT | Mod: CPTII,S$GLB,, | Performed by: STUDENT IN AN ORGANIZED HEALTH CARE EDUCATION/TRAINING PROGRAM

## 2023-12-08 PROCEDURE — 4010F PR ACE/ARB THEARPY RXD/TAKEN: ICD-10-PCS | Mod: CPTII,S$GLB,, | Performed by: STUDENT IN AN ORGANIZED HEALTH CARE EDUCATION/TRAINING PROGRAM

## 2023-12-08 PROCEDURE — 99999 PR PBB SHADOW E&M-EST. PATIENT-LVL III: ICD-10-PCS | Mod: PBBFAC,,, | Performed by: STUDENT IN AN ORGANIZED HEALTH CARE EDUCATION/TRAINING PROGRAM

## 2023-12-08 PROCEDURE — 3077F SYST BP >= 140 MM HG: CPT | Mod: CPTII,S$GLB,, | Performed by: STUDENT IN AN ORGANIZED HEALTH CARE EDUCATION/TRAINING PROGRAM

## 2023-12-08 PROCEDURE — 1101F PT FALLS ASSESS-DOCD LE1/YR: CPT | Mod: CPTII,S$GLB,, | Performed by: STUDENT IN AN ORGANIZED HEALTH CARE EDUCATION/TRAINING PROGRAM

## 2023-12-08 PROCEDURE — 3008F BODY MASS INDEX DOCD: CPT | Mod: CPTII,S$GLB,, | Performed by: STUDENT IN AN ORGANIZED HEALTH CARE EDUCATION/TRAINING PROGRAM

## 2023-12-08 PROCEDURE — 99213 OFFICE O/P EST LOW 20 MIN: CPT | Mod: S$GLB,,, | Performed by: STUDENT IN AN ORGANIZED HEALTH CARE EDUCATION/TRAINING PROGRAM

## 2023-12-08 PROCEDURE — 3044F PR MOST RECENT HEMOGLOBIN A1C LEVEL <7.0%: ICD-10-PCS | Mod: CPTII,S$GLB,, | Performed by: STUDENT IN AN ORGANIZED HEALTH CARE EDUCATION/TRAINING PROGRAM

## 2023-12-08 PROCEDURE — 3077F PR MOST RECENT SYSTOLIC BLOOD PRESSURE >= 140 MM HG: ICD-10-PCS | Mod: CPTII,S$GLB,, | Performed by: STUDENT IN AN ORGANIZED HEALTH CARE EDUCATION/TRAINING PROGRAM

## 2023-12-08 PROCEDURE — 3288F FALL RISK ASSESSMENT DOCD: CPT | Mod: CPTII,S$GLB,, | Performed by: STUDENT IN AN ORGANIZED HEALTH CARE EDUCATION/TRAINING PROGRAM

## 2023-12-08 PROCEDURE — 3078F PR MOST RECENT DIASTOLIC BLOOD PRESSURE < 80 MM HG: ICD-10-PCS | Mod: CPTII,S$GLB,, | Performed by: STUDENT IN AN ORGANIZED HEALTH CARE EDUCATION/TRAINING PROGRAM

## 2023-12-08 PROCEDURE — 3044F HG A1C LEVEL LT 7.0%: CPT | Mod: CPTII,S$GLB,, | Performed by: STUDENT IN AN ORGANIZED HEALTH CARE EDUCATION/TRAINING PROGRAM

## 2023-12-08 PROCEDURE — 3288F PR FALLS RISK ASSESSMENT DOCUMENTED: ICD-10-PCS | Mod: CPTII,S$GLB,, | Performed by: STUDENT IN AN ORGANIZED HEALTH CARE EDUCATION/TRAINING PROGRAM

## 2023-12-08 PROCEDURE — 99213 PR OFFICE/OUTPT VISIT, EST, LEVL III, 20-29 MIN: ICD-10-PCS | Mod: S$GLB,,, | Performed by: STUDENT IN AN ORGANIZED HEALTH CARE EDUCATION/TRAINING PROGRAM

## 2023-12-08 PROCEDURE — 3008F PR BODY MASS INDEX (BMI) DOCUMENTED: ICD-10-PCS | Mod: CPTII,S$GLB,, | Performed by: STUDENT IN AN ORGANIZED HEALTH CARE EDUCATION/TRAINING PROGRAM

## 2023-12-08 PROCEDURE — 99999 PR PBB SHADOW E&M-EST. PATIENT-LVL III: CPT | Mod: PBBFAC,,, | Performed by: STUDENT IN AN ORGANIZED HEALTH CARE EDUCATION/TRAINING PROGRAM

## 2023-12-08 PROCEDURE — 1159F MED LIST DOCD IN RCRD: CPT | Mod: CPTII,S$GLB,, | Performed by: STUDENT IN AN ORGANIZED HEALTH CARE EDUCATION/TRAINING PROGRAM

## 2023-12-08 PROCEDURE — 3078F DIAST BP <80 MM HG: CPT | Mod: CPTII,S$GLB,, | Performed by: STUDENT IN AN ORGANIZED HEALTH CARE EDUCATION/TRAINING PROGRAM

## 2023-12-08 PROCEDURE — 1159F PR MEDICATION LIST DOCUMENTED IN MEDICAL RECORD: ICD-10-PCS | Mod: CPTII,S$GLB,, | Performed by: STUDENT IN AN ORGANIZED HEALTH CARE EDUCATION/TRAINING PROGRAM

## 2023-12-08 RX ORDER — ATORVASTATIN CALCIUM 10 MG/1
10 TABLET, FILM COATED ORAL DAILY
Qty: 90 TABLET | Refills: 3 | Status: SHIPPED | OUTPATIENT
Start: 2023-12-08 | End: 2024-03-21 | Stop reason: SDUPTHER

## 2023-12-08 NOTE — PROGRESS NOTES
Subjective:       Patient ID: Nasima Pacheco is a 73 y.o. female.    Chief Complaint: Follow-up (HTN)    HPI    Nasima Pacheco is a 73 y.o. female , [place] citizen speaking, with a history of:  HTN  HLD  BPPV  OA  Back pain     [Local Patient]  Originally from [place]  Lives in: [place] [address]       Patient comes to the clinic for a follow up of hypertension.    Patient brings blood pressure log with an average blood pressure 110//780.  She is currently on Micardis 80/25 and amlodipine 5 mg daily, as prescribed in her latest appointment with Cardiology.      Patient remains asymptomatic, no leg swelling, no other side effects.    As noted in her blood pressure log there are some elevated blood pressure readings, but those coincide with days that she has forgotten to take her blood pressure medication.    No other complaints or concerns      Since last seen by me:      10/02/23 HTN - increased dose of Micardis to 80-25  09/05/23 HTN - dc amlodipine, started patient on Micardis at low dose.       Changes in health or medications: No    Specialists visits and recommendations:   10/30/23 Cardiology: added amlodipine 5 mg qd    H/o ER visits:   NO    H/o Hospitalizations:  NO    H/o falls: None     Life events / lifestyle:   Nothing new      Most recent laboratories reviewed:    Recent Labs   Lab 09/29/21  1017 03/29/22  0957 03/13/23  1013   WBC 5.36 4.75 4.85   Hemoglobin 14.0 14.3 13.3   Hematocrit 40.6 42.4 40.7   MCV 89 92 92   Platelets 238 220 208       Recent Labs   Lab 08/06/21  1647 09/29/21  1017 03/29/22  0957 09/13/22  1426 03/13/23  1013   Glucose 105 101 92 107 105   Sodium 142 142 143 137 143   Potassium 3.9 3.8 4.1 4.0 4.1   BUN 12 13 14 15 11   Creatinine 0.6 0.6 0.7 0.7 0.6   eGFR if non African American >60.0 >60.0 >60.0  --   --    Total Bilirubin 1.2 H 1.0 1.1 H  --  0.7   AST 15 18 17  --  15   ALT 12 15 12  --  15       Recent Labs   Lab 03/29/22  0957 09/13/22  1426 03/13/23  1013  "  Hemoglobin A1C 5.7 H 5.7 H 5.9 H       Recent Labs   Lab 09/29/21  1017 03/29/22  0957 03/13/23  1013   Cholesterol 172 225 H 171   Triglycerides 75 121 64   HDL 56 57 52   LDL Cholesterol 101.0 143.8 106.2   Non-HDL Cholesterol 116 168 119       Recent Labs   Lab 03/26/21  1007 09/29/21  1017 03/13/23  1013   TSH 2.648 2.283 2.131               Current medications:    Current Outpatient Medications:     amLODIPine (NORVASC) 5 MG tablet, Take 1 tablet (5 mg total) by mouth once daily., Disp: 30 tablet, Rfl: 11    meclizine (ANTIVERT) 12.5 mg tablet, Take 1 tablet (12.5 mg total) by mouth 3 (three) times daily as needed., Disp: 90 tablet, Rfl: 0    telmisartan-hydrochlorothiazide (MICARDIS HCT) 80-25 mg per tablet, Take 1 tablet by mouth once daily., Disp: 90 tablet, Rfl: 3    atorvastatin (LIPITOR) 10 MG tablet, Take 1 tablet (10 mg total) by mouth once daily., Disp: 90 tablet, Rfl: 3    calcium-vitamin D3 (OS-MOLLY 500 + D3) 500 mg-5 mcg (200 unit) per tablet, Take 1 tablet by mouth 2 (two) times daily with meals., Disp: 180 tablet, Rfl: 3      Healthcare Maintenance:  Colonoscopy: FOBT   Vaccinations: Pneumonia 2016, Zoster (no), Tetanus 2023  COVID vaccination: completed x 2  Depression screening: PHQ2 score = 0     Annual Wellness visit approx. Month: March ROS  11-point review of systems done. Negative except for detailed in the HPI.        Objective:      BP (!) 158/75   Pulse 80   Ht 5' 2" (1.575 m)   Wt 61.2 kg (134 lb 14.7 oz)   BMI 24.68 kg/m²     Physical Exam   Physical Exam  Vitals and nursing note reviewed.   Constitutional:       Appearance: Normal appearance.   HENT:      Head: Normocephalic and atraumatic.      Right Ear: Tympanic membrane normal.      Left Ear: Tympanic membrane normal.      Nose: Nose normal.      Mouth/Throat:      Mouth: Mucous membranes are moist.      Pharynx: Oropharynx is clear.   Eyes:      Extraocular Movements: Extraocular movements intact.      Conjunctiva/sclera: " Conjunctivae normal.      Pupils: Pupils are equal, round, and reactive to light.   Cardiovascular:      Rate and Rhythm: Normal rate and regular rhythm.      Pulses: Normal pulses.      Heart sounds: Normal heart sounds.   Pulmonary:      Effort: Pulmonary effort is normal.      Breath sounds: Normal breath sounds.   Abdominal:      General: Bowel sounds are normal. There is no distension.      Palpations: Abdomen is soft.      Tenderness: There is no abdominal tenderness.   Musculoskeletal:         General: Normal range of motion.      Cervical back: Normal range of motion and neck supple.   Skin:     General: Skin is warm.   Neurological:      General: No focal deficit present.      Mental Status: She is alert and oriented to person, place, and time. Mental status is at baseline.   Psychiatric:         Mood and Affect: Mood normal.           Assessment:       1. Primary hypertension  Assessment & Plan:  Controlled.  Will continue same management:  Valsartan 80 + HCTZ 25  Amlodipine 5 mg qd    Recommended to monitor blood pressure regularly, eat a well-balanced diet that's low in salt, DASH diet, enjoy regular physical activity, manage stress, maintain a healthy weight, take medications properly.        2. Dyslipidemia  -     atorvastatin (LIPITOR) 10 MG tablet; Take 1 tablet (10 mg total) by mouth once daily.  Dispense: 90 tablet; Refill: 3    3. Hyperlipidemia, unspecified hyperlipidemia type  Assessment & Plan:  Atorvastatin refilled    Orders:  -     atorvastatin (LIPITOR) 10 MG tablet; Take 1 tablet (10 mg total) by mouth once daily.  Dispense: 90 tablet; Refill: 3    4. Prediabetes  Assessment & Plan:  A1C 5.9  Will manage with lifestyle modifications, diet, weight loss and physical activity.  Education provided.      Orders:  -     CBC Auto Differential; Future; Expected date: 03/04/2024  -     Hemoglobin A1C; Future; Expected date: 03/04/2024  -     Lipid Panel; Future; Expected date: 03/04/2024    5.  Abnormal finding of blood chemistry, unspecified  -     Lipid Panel; Future; Expected date: 03/04/2024    6. Annual physical exam  -     CBC Auto Differential; Future; Expected date: 03/04/2024  -     Comprehensive Metabolic Panel; Future; Expected date: 03/04/2024  -     Hemoglobin A1C; Future; Expected date: 03/04/2024  -     Lipid Panel; Future; Expected date: 03/04/2024  -     Urinalysis; Future; Expected date: 03/04/2024       Plan:       Same management      Patient Instructions   Keep extra dose of blood pressure medication in hand bag as a rescue     Problem list updated, medications reconciled, education provided  Lifestyle recommendations given  AVS printed, explained, and given to the patient.  RTC in : [place] for AWV, labs ordered.            OLGA PHILLIPS MD, MPH  Internal Medicine  [facility]

## 2023-12-08 NOTE — ASSESSMENT & PLAN NOTE
Controlled.  Will continue same management:  Valsartan 80 + HCTZ 25  Amlodipine 5 mg qd    Recommended to monitor blood pressure regularly, eat a well-balanced diet that's low in salt, DASH diet, enjoy regular physical activity, manage stress, maintain a healthy weight, take medications properly.

## 2023-12-14 ENCOUNTER — PATIENT MESSAGE (OUTPATIENT)
Dept: INTERNAL MEDICINE | Facility: CLINIC | Age: 73
End: 2023-12-14
Payer: MEDICARE

## 2024-01-08 ENCOUNTER — HOSPITAL ENCOUNTER (EMERGENCY)
Facility: HOSPITAL | Age: 74
Discharge: HOME | End: 2024-01-08
Attending: EMERGENCY MEDICINE
Payer: MEDICARE

## 2024-01-08 VITALS
HEART RATE: 80 BPM | SYSTOLIC BLOOD PRESSURE: 106 MMHG | RESPIRATION RATE: 18 BRPM | OXYGEN SATURATION: 96 % | DIASTOLIC BLOOD PRESSURE: 57 MMHG | TEMPERATURE: 98 F

## 2024-01-08 DIAGNOSIS — R25.1 TREMULOUSNESS: ICD-10-CM

## 2024-01-08 DIAGNOSIS — R42 VERTIGO: Primary | ICD-10-CM

## 2024-01-08 DIAGNOSIS — R53.1 WEAKNESS: ICD-10-CM

## 2024-01-08 PROBLEM — H81.10 BPPV (BENIGN PAROXYSMAL POSITIONAL VERTIGO): Chronic | Status: ACTIVE | Noted: 2022-05-27

## 2024-01-08 PROBLEM — F32.0 MILD MAJOR DEPRESSION, SINGLE EPISODE: Chronic | Status: ACTIVE | Noted: 2023-03-13

## 2024-01-08 PROBLEM — R73.03 PREDIABETES: Chronic | Status: ACTIVE | Noted: 2023-03-13

## 2024-01-08 LAB
ALBUMIN SERPL BCP-MCNC: 4 G/DL (ref 3.5–5.2)
ALP SERPL-CCNC: 134 U/L (ref 55–135)
ALT SERPL W/O P-5'-P-CCNC: 14 U/L (ref 10–44)
ANION GAP SERPL CALC-SCNC: 9 MMOL/L (ref 8–16)
AST SERPL-CCNC: 13 U/L (ref 10–40)
BASOPHILS # BLD AUTO: 0.02 K/UL (ref 0–0.2)
BASOPHILS NFR BLD: 0.3 % (ref 0–1.9)
BILIRUB SERPL-MCNC: 0.6 MG/DL (ref 0.1–1)
BUN SERPL-MCNC: 13 MG/DL (ref 8–23)
CALCIUM SERPL-MCNC: 9.8 MG/DL (ref 8.7–10.5)
CHLORIDE SERPL-SCNC: 103 MMOL/L (ref 95–110)
CO2 SERPL-SCNC: 28 MMOL/L (ref 23–29)
CREAT SERPL-MCNC: 0.6 MG/DL (ref 0.5–1.4)
DIFFERENTIAL METHOD BLD: ABNORMAL
EOSINOPHIL # BLD AUTO: 0.1 K/UL (ref 0–0.5)
EOSINOPHIL NFR BLD: 2 % (ref 0–8)
ERYTHROCYTE [DISTWIDTH] IN BLOOD BY AUTOMATED COUNT: 13 % (ref 11.5–14.5)
EST. GFR  (NO RACE VARIABLE): >60 ML/MIN/1.73 M^2
GLUCOSE SERPL-MCNC: 133 MG/DL (ref 70–110)
HCT VFR BLD AUTO: 34.6 % (ref 37–48.5)
HGB BLD-MCNC: 12.6 G/DL (ref 12–16)
IMM GRANULOCYTES # BLD AUTO: 0.02 K/UL (ref 0–0.04)
IMM GRANULOCYTES NFR BLD AUTO: 0.3 % (ref 0–0.5)
LYMPHOCYTES # BLD AUTO: 1.9 K/UL (ref 1–4.8)
LYMPHOCYTES NFR BLD: 31.7 % (ref 18–48)
MAGNESIUM SERPL-MCNC: 1.8 MG/DL (ref 1.6–2.6)
MCH RBC QN AUTO: 30.1 PG (ref 27–31)
MCHC RBC AUTO-ENTMCNC: 36.4 G/DL (ref 32–36)
MCV RBC AUTO: 83 FL (ref 82–98)
MONOCYTES # BLD AUTO: 0.5 K/UL (ref 0.3–1)
MONOCYTES NFR BLD: 8.7 % (ref 4–15)
NEUTROPHILS # BLD AUTO: 3.5 K/UL (ref 1.8–7.7)
NEUTROPHILS NFR BLD: 57 % (ref 38–73)
NRBC BLD-RTO: 0 /100 WBC
PLATELET # BLD AUTO: 202 K/UL (ref 150–450)
PMV BLD AUTO: 10.4 FL (ref 9.2–12.9)
POTASSIUM SERPL-SCNC: 3.7 MMOL/L (ref 3.5–5.1)
PROT SERPL-MCNC: 6.7 G/DL (ref 6–8.4)
RBC # BLD AUTO: 4.18 M/UL (ref 4–5.4)
SODIUM SERPL-SCNC: 140 MMOL/L (ref 136–145)
WBC # BLD AUTO: 6.08 K/UL (ref 3.9–12.7)

## 2024-01-08 PROCEDURE — 93005 ELECTROCARDIOGRAM TRACING: CPT

## 2024-01-08 PROCEDURE — 83735 ASSAY OF MAGNESIUM: CPT | Performed by: EMERGENCY MEDICINE

## 2024-01-08 PROCEDURE — 99285 EMERGENCY DEPT VISIT HI MDM: CPT | Mod: 25

## 2024-01-08 PROCEDURE — 85025 COMPLETE CBC W/AUTO DIFF WBC: CPT | Performed by: EMERGENCY MEDICINE

## 2024-01-08 PROCEDURE — 80053 COMPREHEN METABOLIC PANEL: CPT | Performed by: EMERGENCY MEDICINE

## 2024-01-08 PROCEDURE — 93010 ELECTROCARDIOGRAM REPORT: CPT | Mod: ,,, | Performed by: INTERNAL MEDICINE

## 2024-01-08 RX ORDER — MECLIZINE HCL 12.5 MG 12.5 MG/1
12.5 TABLET ORAL 3 TIMES DAILY PRN
Qty: 30 TABLET | Refills: 0 | Status: SHIPPED | OUTPATIENT
Start: 2024-01-08 | End: 2024-01-22

## 2024-01-08 NOTE — ED PROVIDER NOTES
"Source of History:  Patient - patient declined [place]   Grandson  Chart    Chief complaint:  Dizziness and Tremors (Pt brought in by family reports pt had an episode of "tremors" lasting approximately an hr beginning at 11pm. Pt was able to speak/ answer questions but "kept eyes closed." Pt also reports hx of dizziness takes rx meclizine but states tonight is the "worst its ever been." Pt denies h/a, blurred vision, chills. No slurred speech, facial drooping, extremity weakness noted. )      HPI:  Nasima Pacheco is a 73 y.o. female with history of hypertension, hyperlipidemia, BPPV, depression, prediabetes, presenting to emergency department with complaint of episode of dizziness that has since resolved.    Patient states she was diagnosed with vertigo approximately 1 year ago.  Her doctor prescribed her meclizine which she was taking as often as once per week for episodes of vertigo.  The episodes are normally very transient, not severe.  This evening, she was struck with a severe episode of room spinning dizziness that caused her to close her eyes.  She was still conscious and responsive but she was tremulous throughout.  There was no nausea or vomiting.  No headache.  No altered mental status, blurred vision, speech changes, focal numbness or weakness of the arms or legs.  She took meclizine, which she does whenever she has an episode, but it took longer for this episode to resolve than usual.    Patient has never seen ENT for this problem.  At this time, she was no complaints, states that she feels better.  No more dizziness.  No lightheadedness, chest pain, shortness of breath, or other complaints.    Review of patient's allergies indicates:  No Known Allergies    No current facility-administered medications on file prior to encounter.     Current Outpatient Medications on File Prior to Encounter   Medication Sig Dispense Refill    amLODIPine (NORVASC) 5 MG tablet Take 1 tablet (5 mg " total) by mouth once daily. 30 tablet 11    atorvastatin (LIPITOR) 10 MG tablet Take 1 tablet (10 mg total) by mouth once daily. 90 tablet 3    calcium-vitamin D3 (OS-MOLLY 500 + D3) 500 mg-5 mcg (200 unit) per tablet Take 1 tablet by mouth 2 (two) times daily with meals. 180 tablet 3    meclizine (ANTIVERT) 12.5 mg tablet Take 1 tablet (12.5 mg total) by mouth 3 (three) times daily as needed. 90 tablet 0    telmisartan-hydrochlorothiazide (MICARDIS HCT) 80-25 mg per tablet Take 1 tablet by mouth once daily. 90 tablet 3       PMH:  As per HPI and below:  Past Medical History:   Diagnosis Date    Arthritis     Cataract     Hyperlipidemia     Hypertension      Past Surgical History:   Procedure Laterality Date    CATARACT EXTRACTION W/  INTRAOCULAR LENS IMPLANT Right 2017    Dr. Mendez    CATARACT EXTRACTION W/  INTRAOCULAR LENS IMPLANT Left 2017    Dr. Mendez     SECTION      EYE SURGERY      HYSTERECTOMY      OOPHORECTOMY         Social History     Socioeconomic History    Marital status:    Tobacco Use    Smoking status: Never    Smokeless tobacco: Never   Substance and Sexual Activity    Alcohol use: No    Drug use: Never    Sexual activity: Not Currently     Partners: Male     Social Determinants of Health     Financial Resource Strain: Low Risk  (2022)    Overall Financial Resource Strain (CARDIA)     Difficulty of Paying Living Expenses: Not hard at all   Food Insecurity: No Food Insecurity (2022)    Hunger Vital Sign     Worried About Running Out of Food in the Last Year: Never true     Ran Out of Food in the Last Year: Never true   Transportation Needs: No Transportation Needs (2022)    PRAPARE - Transportation     Lack of Transportation (Medical): No     Lack of Transportation (Non-Medical): No   Physical Activity: Inactive (2022)    Exercise Vital Sign     Days of Exercise per Week: 0 days     Minutes of Exercise per Session: 0 min   Stress: No Stress Concern Present  (5/24/2022)    [place] - Occupational Stress Questionnaire     Feeling of Stress : Not at all   Social Connections: Socially Integrated (5/24/2022)    Social Connection and Isolation Panel [NHANES]     Frequency of Communication with Friends and Family: More than three times a week     Frequency of Social Gatherings with Friends and Family: Once a week     Attends [employer]ism Services: More than 4 times per year     Active Member of Clubs or Organizations: Yes     Attends Club or Organization Meetings: More than 4 times per year     Marital Status:    Housing Stability: Unknown (5/24/2022)    Housing Stability Vital Sign     Unable to Pay for Housing in the Last Year: No     Unstable Housing in the Last Year: No       Family History   Problem Relation Age of Onset    Heart attack Mother     Amblyopia Neg Hx     Blindness Neg Hx     Cataracts Neg Hx     Glaucoma Neg Hx     Macular degeneration Neg Hx     Retinal detachment Neg Hx     Strabismus Neg Hx        Physical Exam:      Vitals:    01/08/24 0500   BP: (!) 146/68   Pulse: 96   Resp: 20   Temp: 98 °F (36.7 °C)     Gen: No acute distress.  Nontoxic.  Well appearing.  Mental Status:  Alert and oriented .  Appropriate, conversant.  Skin: Warm, dry. No rashes seen.  Eyes: No conjunctival injection.  Pulm: CTAB. No increased work of breathing.  No significant tachypnea.  No audible stridor or wheezing.  No conversational dyspnea.    CV: Regular rate. Regular rhythm.   Abd: Soft.  Not distended.  Nontender.   MSK: Good range of motion all joints.  No deformities.    Neuro: Awake. Speech normal. No focal neuro deficit observed.   Cranial nerves II-XII intact. No facial droop. No nystagmus.  Motor: Grossly intact.    5/5 strength with shoulder abduction and abduction, elbow flexion and extension, hand , wrist flexion and extension.    5/5 strength with hip flexion and extension, knee flexion extension, dorsiflexion and plantar  flexion.  Sensation: Grossly intact. Intact sensation to all 4 extremities.  Cerebellar: No dysmetria.  Gait not assessed      Laboratory Studies:  Labs Reviewed   CBC W/ AUTO DIFFERENTIAL - Abnormal; Notable for the following components:       Result Value    Hematocrit 34.6 (*)     MCHC 36.4 (*)     All other components within normal limits   COMPREHENSIVE METABOLIC PANEL - Abnormal; Notable for the following components:    Glucose 133 (*)     All other components within normal limits   MAGNESIUM       EKG (independently interpreted by me):  Normal sinus rhythm, rate 83.  No STEMI.  QRS 78 milliseconds.   milliseconds    Chart reviewed.     Imaging Results              MRI Brain Without Contrast (In process)                     Medications Given:  Medications - No data to display    MDM:    73 y.o. female with history of what is thought to be peripheral vertigo presenting to emergency department complaint of severe episode of room spinning dizziness that has since resolved.  She was afebrile, hemodynamically stable, neuro intact.  Well hydrated, well appearing.    Labs reviewed.  No leukocytosis.  No electrolyte derangement or acute kidney injury.    5:54 AM  MRI without acute abnormality.  Will walk test patient.  If able to ambulate, will discharge home with meclizine refill and referral to ENT.      Diagnostic Impression:    1. Vertigo    2. Weakness    3. Tremulousness               Patient understands the plan and is in agreement, verbalized understanding, questions answered    Kristan Duque MD  Emergency Medicine         Neto, Kristan KOTHARI MD  01/08/24 4827

## 2024-01-08 NOTE — ED NOTES
"Nasima Pacheco, a 73 y.o. female presents to the ED w/ complaint of tremors lasting 1 hour. Pt c/o dizziness, feeling like the room is spinning, and difficulty walking d/t tremoring. Pt on meclizine for hx of dizziness, states tonight is the worst it has been    Triage note:  Chief Complaint   Patient presents with    Dizziness    Tremors     Pt brought in by family reports pt had an episode of "tremors" lasting approximately an hr beginning at 11pm. Pt was able to speak/ answer questions but "kept eyes closed." Pt also reports hx of dizziness takes rx meclizine but states tonight is the "worst its ever been." Pt denies h/a, blurred vision, chills. No slurred speech, facial drooping, extremity weakness noted.      Review of patient's allergies indicates:  No Known Allergies  Past Medical History:   Diagnosis Date    Arthritis     Cataract     Hyperlipidemia     Hypertension    Patient identifiers for Nasima Pacheco checked and correct.    LOC: The patient is awake, alert and aware of environment with an appropriate affect, the patient is oriented x 4 and speaking appropriately.  APPEARANCE: Patient resting comfortably and in no acute distress, patient is clean and well groomed, patient's clothing is properly fastened.  SKIN: The skin is warm and dry, color consistent with ethnicity, patient has normal skin turgor and moist mucus membranes, skin intact, no breakdown or bruising noted.  MUSCULOSKELETAL: Patient moving all extremities well, no obvious swelling or deformities noted.  RESPIRATORY: Airway is open and patent, respirations are spontaneous and even, patient has a normal effort and rate.  CARDIAC: Patient has a normal rate and rhythm, no periphreal edema noted, capillary refill < 3 seconds. Normal +2 pedal pulses present.  ABDOMEN: Soft and non tender to palpation, no distention noted. Patient denies any nausea, vomiting, diarrhea, or constipation.   NEUROLOGIC: Eyes open spontaneously, PERRL, " behavior appropriate to situation, follows commands, facial expression symmetrical, bilateral hand grasp equal and even, purposeful motor response noted, normal sensation in all extremities. +tremors, dizzy

## 2024-01-09 ENCOUNTER — PATIENT MESSAGE (OUTPATIENT)
Dept: INTERNAL MEDICINE | Facility: CLINIC | Age: 74
End: 2024-01-09
Payer: MEDICARE

## 2024-01-22 ENCOUNTER — OFFICE VISIT (OUTPATIENT)
Dept: INTERNAL MEDICINE | Facility: CLINIC | Age: 74
End: 2024-01-22
Payer: MEDICARE

## 2024-01-22 VITALS
BODY MASS INDEX: 25.4 KG/M2 | HEIGHT: 62 IN | SYSTOLIC BLOOD PRESSURE: 139 MMHG | WEIGHT: 138 LBS | DIASTOLIC BLOOD PRESSURE: 74 MMHG | HEART RATE: 89 BPM

## 2024-01-22 DIAGNOSIS — M54.16 LUMBAR RADICULOPATHY, CHRONIC: ICD-10-CM

## 2024-01-22 DIAGNOSIS — Z09 HOSPITAL DISCHARGE FOLLOW-UP: Primary | ICD-10-CM

## 2024-01-22 DIAGNOSIS — H81.10 BENIGN PAROXYSMAL POSITIONAL VERTIGO, UNSPECIFIED LATERALITY: Chronic | ICD-10-CM

## 2024-01-22 DIAGNOSIS — M54.16 LUMBAR RADICULOPATHY: ICD-10-CM

## 2024-01-22 DIAGNOSIS — I70.0 ATHEROSCLEROSIS OF AORTA: ICD-10-CM

## 2024-01-22 PROBLEM — F32.0 MILD MAJOR DEPRESSION, SINGLE EPISODE: Chronic | Status: RESOLVED | Noted: 2023-03-13 | Resolved: 2024-01-22

## 2024-01-22 PROCEDURE — 99999 PR PBB SHADOW E&M-EST. PATIENT-LVL IV: CPT | Mod: PBBFAC,,, | Performed by: STUDENT IN AN ORGANIZED HEALTH CARE EDUCATION/TRAINING PROGRAM

## 2024-01-22 PROCEDURE — 99213 OFFICE O/P EST LOW 20 MIN: CPT | Mod: S$GLB,,, | Performed by: STUDENT IN AN ORGANIZED HEALTH CARE EDUCATION/TRAINING PROGRAM

## 2024-01-22 NOTE — ASSESSMENT & PLAN NOTE
Chronic lumbar pain, not with paresthesias of the left thigh and shooting pain to the RLE up to the right foot.  Will obtain XR and MRI of the lumbar spine.

## 2024-01-22 NOTE — PROGRESS NOTES
Subjective:       Patient ID: Nasima Pacheco is a 73 y.o. female.    Chief Complaint: Follow-up    HPI    Nasima Pacheco is a 73 y.o. female , [place] speaking, with a history of:  HTN  HLD  BPPV  OA  Back pain         [Local Patient]  Originally from [place]  Lives in: [place] [address]       Patient comes to the clinic for a hospital discharge follow-up.      Patient was recently admitted to the ER in observation for very severe vertigo.    Did has not happened in a long time, and despite the use of meclizine patient had an episode that took her to the ER with severe dizziness, nausea, vomiting.    After discharge patient has been taking meclizine b.i.d. p.r.n., in no new episodes of vertigo have happened.    Blood pressure has been well-controlled.    During the short hospitalization patient's MRI of the brain was normal.      Patient denies chest pain, shortness for breath, palpitations or any other symptoms.    Her only complaint today is a chronic lumbar pain that has worsened and now is presenting with additional paresthesias of the lateral side of the left thigh and shooting pain (radiculopathy) of the right lower extremity.  The shooting pain goes down to the right foot.      No other complaints.      Since last seen by me:      10/02/23 HTN - increased dose of Micardis to 80-25  09/05/23 HTN - dc amlodipine, started patient on Micardis at low dose.      Changes in health or medications: No    Specialists visits and recommendations:   10/30/23 Cardiology: added amlodipine 5 mg qd     H/o ER visits:     1/08/24 Vertigo    H/o Hospitalizations:  NO    H/o falls: None     Life events / lifestyle:   Nothing new      Most recent laboratories reviewed:    Recent Labs   Lab 03/29/22  0957 03/13/23  1013 01/08/24  0341   WBC 4.75 4.85 6.08   Hemoglobin 14.3 13.3 12.6   Hematocrit 42.4 40.7 34.6 L   MCV 92 92 83   Platelets 220 208 202       Recent Labs   Lab 08/06/21  1647 09/29/21  1017 03/29/22  0957  09/13/22  1426 03/13/23  1013 01/08/24  0341   Glucose 105 101 92 107 105 133 H   Sodium 142 142 143 137 143 140   Potassium 3.9 3.8 4.1 4.0 4.1 3.7   BUN 12 13 14 15 11 13   Creatinine 0.6 0.6 0.7 0.7 0.6 0.6   eGFR if non African American >60.0 >60.0 >60.0  --   --   --    Total Bilirubin 1.2 H 1.0 1.1 H  --  0.7 0.6   AST 15 18 17  --  15 13   ALT 12 15 12  --  15 14       Recent Labs   Lab 03/29/22  0957 09/13/22  1426 03/13/23  1013   Hemoglobin A1C 5.7 H 5.7 H 5.9 H       Recent Labs   Lab 09/29/21  1017 03/29/22  0957 03/13/23  1013   Cholesterol 172 225 H 171   Triglycerides 75 121 64   HDL 56 57 52   LDL Cholesterol 101.0 143.8 106.2   Non-HDL Cholesterol 116 168 119       Recent Labs   Lab 03/26/21  1007 09/29/21  1017 03/13/23  1013   TSH 2.648 2.283 2.131       Current medications:    Current Outpatient Medications:     amLODIPine (NORVASC) 5 MG tablet, Take 1 tablet (5 mg total) by mouth once daily., Disp: 30 tablet, Rfl: 11    atorvastatin (LIPITOR) 10 MG tablet, Take 1 tablet (10 mg total) by mouth once daily., Disp: 90 tablet, Rfl: 3    meclizine (ANTIVERT) 12.5 mg tablet, Take 1 tablet (12.5 mg total) by mouth 3 (three) times daily as needed. (Patient taking differently: Take 12.5 mg by mouth 3 (three) times daily as needed. Takes 2 times a day.), Disp: 90 tablet, Rfl: 0    telmisartan-hydrochlorothiazide (MICARDIS HCT) 80-25 mg per tablet, Take 1 tablet by mouth once daily., Disp: 90 tablet, Rfl: 3    calcium-vitamin D3 (OS-MOLLY 500 + D3) 500 mg-5 mcg (200 unit) per tablet, Take 1 tablet by mouth 2 (two) times daily with meals., Disp: 180 tablet, Rfl: 3      Healthcare Maintenance:  Colonoscopy: FOBT   Vaccinations: Pneumonia 2016, Zoster (no), Tetanus 2023  COVID vaccination: completed x 2  Depression screening: PHQ2 score = 0     Annual Wellness visit approx. Month: March ROS 11-point review of systems done. Negative except for detailed in the HPI.        Objective:      /74   Pulse 89   " Ht 5' 2" (1.575 m)   Wt 62.6 kg (138 lb 0.1 oz)   BMI 25.24 kg/m²     Physical Exam   Physical Exam  Vitals and nursing note reviewed.   Constitutional:       Appearance: Normal appearance.   HENT:      Head: Normocephalic and atraumatic.      Right Ear: Tympanic membrane normal.      Left Ear: Tympanic membrane normal.      Nose: Nose normal.      Mouth/Throat:      Mouth: Mucous membranes are moist.      Pharynx: Oropharynx is clear.   Eyes:      Extraocular Movements: Extraocular movements intact.      Conjunctiva/sclera: Conjunctivae normal.      Pupils: Pupils are equal, round, and reactive to light.   Cardiovascular:      Rate and Rhythm: Normal rate and regular rhythm.      Pulses: Normal pulses.      Heart sounds: Normal heart sounds.   Pulmonary:      Effort: Pulmonary effort is normal.      Breath sounds: Normal breath sounds.   Abdominal:      General: Bowel sounds are normal. There is no distension.      Palpations: Abdomen is soft.      Tenderness: There is no abdominal tenderness.   Musculoskeletal:         General: Normal range of motion.      Cervical back: Normal range of motion and neck supple.   Skin:     General: Skin is warm.   Neurological:      General: No focal deficit present.      Mental Status: She is alert and oriented to person, place, and time. Mental status is at baseline.   Psychiatric:         Mood and Affect: Mood normal.           Assessment:       1. Hospital discharge follow-up  Comments:  Stable, improved, asymptomatic, has ENT appontment scheduled.    2. Benign paroxysmal positional vertigo, unspecified laterality  Assessment & Plan:  Stable since discharge.  Continue meclizine  Appointment scheduled with ENT.      3. Atherosclerosis of aorta  Overview:  Ct abd 2022    Assessment & Plan:  On statin, will continue      4. Lumbar radiculopathy  Assessment & Plan:  Chronic lumbar pain, not with paresthesias of the left thigh and shooting pain to the RLE up to the right " foot.  Will obtain XR and MRI of the lumbar spine.    Orders:  -     X-Ray Lumbar Spine 5 View; Future; Expected date: 01/22/2024  -     MRI Lumbar Spine Without Contrast; Future; Expected date: 01/22/2024    5. Lumbar radiculopathy, chronic  -     MRI Lumbar Spine Without Contrast; Future; Expected date: 01/22/2024       Plan:       XR lumbar spine  MRI lumbar spine      Problem list updated, medications reconciled, education provided  Lifestyle recommendations given  AVS printed, explained, and given to the patient.  RTC in : 2 months for AWV, labs ORDERED          OLGA PHILLIPS MD, MPH  Internal Medicine  [facility]  [facility]

## 2024-01-26 ENCOUNTER — HOSPITAL ENCOUNTER (OUTPATIENT)
Dept: RADIOLOGY | Facility: HOSPITAL | Age: 74
Discharge: HOME | End: 2024-01-26
Attending: STUDENT IN AN ORGANIZED HEALTH CARE EDUCATION/TRAINING PROGRAM
Payer: MEDICARE

## 2024-01-26 DIAGNOSIS — M54.16 LUMBAR RADICULOPATHY: ICD-10-CM

## 2024-01-26 DIAGNOSIS — M54.16 LUMBAR RADICULOPATHY, CHRONIC: ICD-10-CM

## 2024-01-26 PROCEDURE — 72110 X-RAY EXAM L-2 SPINE 4/>VWS: CPT | Mod: 26,,, | Performed by: RADIOLOGY

## 2024-01-26 PROCEDURE — 72110 X-RAY EXAM L-2 SPINE 4/>VWS: CPT | Mod: TC

## 2024-01-26 PROCEDURE — 72148 MRI LUMBAR SPINE W/O DYE: CPT | Mod: 26,,, | Performed by: RADIOLOGY

## 2024-01-26 PROCEDURE — 72148 MRI LUMBAR SPINE W/O DYE: CPT | Mod: TC

## 2024-01-29 ENCOUNTER — PATIENT MESSAGE (OUTPATIENT)
Dept: INTERNAL MEDICINE | Facility: CLINIC | Age: 74
End: 2024-01-29
Payer: MEDICARE

## 2024-03-20 ENCOUNTER — PATIENT MESSAGE (OUTPATIENT)
Dept: ADMINISTRATIVE | Facility: OTHER | Age: 74
End: 2024-03-20
Payer: MEDICARE

## 2024-03-20 ENCOUNTER — TELEPHONE (OUTPATIENT)
Dept: INTERNAL MEDICINE | Facility: CLINIC | Age: 74
End: 2024-03-20
Payer: MEDICARE

## 2024-03-21 ENCOUNTER — OFFICE VISIT (OUTPATIENT)
Dept: INTERNAL MEDICINE | Facility: CLINIC | Age: 74
End: 2024-03-21
Payer: MEDICARE

## 2024-03-21 ENCOUNTER — LAB VISIT (OUTPATIENT)
Dept: LAB | Facility: HOSPITAL | Age: 74
End: 2024-03-21
Attending: STUDENT IN AN ORGANIZED HEALTH CARE EDUCATION/TRAINING PROGRAM
Payer: MEDICARE

## 2024-03-21 VITALS
BODY MASS INDEX: 25.15 KG/M2 | DIASTOLIC BLOOD PRESSURE: 70 MMHG | HEART RATE: 79 BPM | WEIGHT: 136.69 LBS | OXYGEN SATURATION: 99 % | SYSTOLIC BLOOD PRESSURE: 118 MMHG | HEIGHT: 62 IN

## 2024-03-21 DIAGNOSIS — E78.5 HYPERLIPIDEMIA, UNSPECIFIED HYPERLIPIDEMIA TYPE: ICD-10-CM

## 2024-03-21 DIAGNOSIS — E78.2 MIXED HYPERLIPIDEMIA: Chronic | ICD-10-CM

## 2024-03-21 DIAGNOSIS — R10.11 RIGHT UPPER QUADRANT PAIN: ICD-10-CM

## 2024-03-21 DIAGNOSIS — Z00.00 HEALTHCARE MAINTENANCE: ICD-10-CM

## 2024-03-21 DIAGNOSIS — Z00.00 ANNUAL PHYSICAL EXAM: Primary | ICD-10-CM

## 2024-03-21 DIAGNOSIS — Z78.0 ASYMPTOMATIC MENOPAUSAL STATE: ICD-10-CM

## 2024-03-21 DIAGNOSIS — M79.642 PAIN IN BOTH HANDS: ICD-10-CM

## 2024-03-21 DIAGNOSIS — I10 PRIMARY HYPERTENSION: Chronic | ICD-10-CM

## 2024-03-21 DIAGNOSIS — R73.03 PREDIABETES: Chronic | ICD-10-CM

## 2024-03-21 DIAGNOSIS — E78.5 DYSLIPIDEMIA: ICD-10-CM

## 2024-03-21 DIAGNOSIS — R79.9 ABNORMAL FINDING OF BLOOD CHEMISTRY, UNSPECIFIED: ICD-10-CM

## 2024-03-21 DIAGNOSIS — M79.641 PAIN IN BOTH HANDS: ICD-10-CM

## 2024-03-21 DIAGNOSIS — M54.16 LUMBAR RADICULOPATHY: ICD-10-CM

## 2024-03-21 DIAGNOSIS — R73.03 PREDIABETES: ICD-10-CM

## 2024-03-21 DIAGNOSIS — Z00.00 ANNUAL PHYSICAL EXAM: ICD-10-CM

## 2024-03-21 DIAGNOSIS — H81.10 BENIGN PAROXYSMAL POSITIONAL VERTIGO, UNSPECIFIED LATERALITY: Chronic | ICD-10-CM

## 2024-03-21 PROBLEM — R07.89 OTHER CHEST PAIN: Status: RESOLVED | Noted: 2022-05-03 | Resolved: 2024-03-21

## 2024-03-21 LAB
ALBUMIN SERPL BCP-MCNC: 4.3 G/DL (ref 3.5–5.2)
ALP SERPL-CCNC: 125 U/L (ref 55–135)
ALT SERPL W/O P-5'-P-CCNC: 16 U/L (ref 10–44)
ANION GAP SERPL CALC-SCNC: 11 MMOL/L (ref 8–16)
AST SERPL-CCNC: 16 U/L (ref 10–40)
BASOPHILS # BLD AUTO: 0.03 K/UL (ref 0–0.2)
BASOPHILS NFR BLD: 0.5 % (ref 0–1.9)
BILIRUB SERPL-MCNC: 1 MG/DL (ref 0.1–1)
BUN SERPL-MCNC: 10 MG/DL (ref 8–23)
CALCIUM SERPL-MCNC: 9.8 MG/DL (ref 8.7–10.5)
CHLORIDE SERPL-SCNC: 103 MMOL/L (ref 95–110)
CHOLEST SERPL-MCNC: 206 MG/DL (ref 120–199)
CHOLEST/HDLC SERPL: 4.7 {RATIO} (ref 2–5)
CO2 SERPL-SCNC: 27 MMOL/L (ref 23–29)
CREAT SERPL-MCNC: 0.6 MG/DL (ref 0.5–1.4)
DIFFERENTIAL METHOD BLD: NORMAL
EOSINOPHIL # BLD AUTO: 0.1 K/UL (ref 0–0.5)
EOSINOPHIL NFR BLD: 2 % (ref 0–8)
ERYTHROCYTE [DISTWIDTH] IN BLOOD BY AUTOMATED COUNT: 13.2 % (ref 11.5–14.5)
EST. GFR  (NO RACE VARIABLE): >60 ML/MIN/1.73 M^2
ESTIMATED AVG GLUCOSE: 123 MG/DL (ref 68–131)
GLUCOSE SERPL-MCNC: 100 MG/DL (ref 70–110)
HBA1C MFR BLD: 5.9 % (ref 4–5.6)
HCT VFR BLD AUTO: 40.5 % (ref 37–48.5)
HDLC SERPL-MCNC: 44 MG/DL (ref 40–75)
HDLC SERPL: 21.4 % (ref 20–50)
HGB BLD-MCNC: 13.8 G/DL (ref 12–16)
IMM GRANULOCYTES # BLD AUTO: 0.01 K/UL (ref 0–0.04)
IMM GRANULOCYTES NFR BLD AUTO: 0.2 % (ref 0–0.5)
LDLC SERPL CALC-MCNC: 140.4 MG/DL (ref 63–159)
LYMPHOCYTES # BLD AUTO: 1.9 K/UL (ref 1–4.8)
LYMPHOCYTES NFR BLD: 32.5 % (ref 18–48)
MCH RBC QN AUTO: 30.6 PG (ref 27–31)
MCHC RBC AUTO-ENTMCNC: 34.1 G/DL (ref 32–36)
MCV RBC AUTO: 90 FL (ref 82–98)
MONOCYTES # BLD AUTO: 0.5 K/UL (ref 0.3–1)
MONOCYTES NFR BLD: 7.8 % (ref 4–15)
NEUTROPHILS # BLD AUTO: 3.4 K/UL (ref 1.8–7.7)
NEUTROPHILS NFR BLD: 57 % (ref 38–73)
NONHDLC SERPL-MCNC: 162 MG/DL
NRBC BLD-RTO: 0 /100 WBC
PLATELET # BLD AUTO: 242 K/UL (ref 150–450)
PMV BLD AUTO: 10.7 FL (ref 9.2–12.9)
POTASSIUM SERPL-SCNC: 4.2 MMOL/L (ref 3.5–5.1)
PROT SERPL-MCNC: 6.8 G/DL (ref 6–8.4)
RBC # BLD AUTO: 4.51 M/UL (ref 4–5.4)
SODIUM SERPL-SCNC: 141 MMOL/L (ref 136–145)
TRIGL SERPL-MCNC: 108 MG/DL (ref 30–150)
WBC # BLD AUTO: 5.88 K/UL (ref 3.9–12.7)

## 2024-03-21 PROCEDURE — 99397 PER PM REEVAL EST PAT 65+ YR: CPT | Mod: S$GLB,,, | Performed by: STUDENT IN AN ORGANIZED HEALTH CARE EDUCATION/TRAINING PROGRAM

## 2024-03-21 PROCEDURE — 36415 COLL VENOUS BLD VENIPUNCTURE: CPT | Performed by: STUDENT IN AN ORGANIZED HEALTH CARE EDUCATION/TRAINING PROGRAM

## 2024-03-21 PROCEDURE — 83036 HEMOGLOBIN GLYCOSYLATED A1C: CPT | Performed by: STUDENT IN AN ORGANIZED HEALTH CARE EDUCATION/TRAINING PROGRAM

## 2024-03-21 PROCEDURE — 80053 COMPREHEN METABOLIC PANEL: CPT | Performed by: STUDENT IN AN ORGANIZED HEALTH CARE EDUCATION/TRAINING PROGRAM

## 2024-03-21 PROCEDURE — 99999 PR PBB SHADOW E&M-EST. PATIENT-LVL III: CPT | Mod: PBBFAC,,, | Performed by: STUDENT IN AN ORGANIZED HEALTH CARE EDUCATION/TRAINING PROGRAM

## 2024-03-21 PROCEDURE — 80061 LIPID PANEL: CPT | Performed by: STUDENT IN AN ORGANIZED HEALTH CARE EDUCATION/TRAINING PROGRAM

## 2024-03-21 PROCEDURE — 85025 COMPLETE CBC W/AUTO DIFF WBC: CPT | Performed by: STUDENT IN AN ORGANIZED HEALTH CARE EDUCATION/TRAINING PROGRAM

## 2024-03-21 RX ORDER — MECLIZINE HYDROCHLORIDE 50 MG/1
12.5 TABLET ORAL 3 TIMES DAILY PRN
COMMUNITY

## 2024-03-21 RX ORDER — MELOXICAM 7.5 MG/1
7.5 TABLET ORAL DAILY PRN
Qty: 30 TABLET | Refills: 2 | Status: SHIPPED | OUTPATIENT
Start: 2024-03-21 | End: 2024-03-21

## 2024-03-21 RX ORDER — FERROUS SULFATE, DRIED 160(50) MG
1 TABLET, EXTENDED RELEASE ORAL 2 TIMES DAILY WITH MEALS
Qty: 180 TABLET | Refills: 3 | Status: SHIPPED | OUTPATIENT
Start: 2024-03-21 | End: 2025-03-21

## 2024-03-21 RX ORDER — ATORVASTATIN CALCIUM 10 MG/1
10 TABLET, FILM COATED ORAL DAILY
Qty: 90 TABLET | Refills: 3 | Status: SHIPPED | OUTPATIENT
Start: 2024-03-21 | End: 2025-03-21

## 2024-03-21 RX ORDER — AMLODIPINE BESYLATE 5 MG/1
5 TABLET ORAL DAILY
Qty: 90 TABLET | Refills: 3 | Status: SHIPPED | OUTPATIENT
Start: 2024-03-21 | End: 2025-03-21

## 2024-03-21 RX ORDER — MELOXICAM 7.5 MG/1
7.5 TABLET ORAL DAILY PRN
Qty: 30 TABLET | Refills: 2 | Status: SHIPPED | OUTPATIENT
Start: 2024-03-21 | End: 2024-06-19

## 2024-03-21 RX ORDER — TELMISARTAN AND HYDROCHLORTHIAZIDE 80; 25 MG/1; MG/1
1 TABLET ORAL DAILY
Qty: 90 TABLET | Refills: 3 | Status: SHIPPED | OUTPATIENT
Start: 2024-03-21 | End: 2025-03-21

## 2024-03-21 NOTE — PROGRESS NOTES
"Subjective:       Patient ID: Nasima Pacheco is a 73 y.o. female.    Chief Complaint: Medicare AWV    HPI    Nasima Pacheco is a 73 y.o. female , [place] speaking, with a history of:  HTN  HLD  BPPV  OA  Back pain      [Local Patient]  Originally from [facility]  Lives in: [place] [address]       Patient comes to the clinic for a general physical exam (Annual Wellness Visit)    Patient is currently asymptomatic and has no complaints.  Patient denies CV symptoms, CP, SOB, palpitations.  Patient denies constitutional symptoms, fever, changes in the urine or stool.    Patient continues to experience thoraco-lumbar back pain, worse with activity, not relieved with tylenol, improves with rest. Pending appointment with back and spine.    Patient complaints of occasional RUQP, "stabbing", unrelated with meals, lasting a few minutes, that goes away on its own.    No recent episodes of vertigo.  MRI Lumbar Spine Without Contrast  Narrative: EXAMINATION:  MRI LUMBAR SPINE WITHOUT CONTRAST    CLINICAL HISTORY:  Lumbar radiculopathy, symptoms persist with conservative treatment; Radiculopathy, lumbar region    TECHNIQUE:  Multiplanar, multisequence MR images were acquired from the thoracolumbar junction to the sacrum without the administration of contrast.    COMPARISON:  Radiographs 01/26/2024    FINDINGS:  Lumbar spine alignment demonstrates dextroscoliosis.  No spondylolisthesis.  No spondylolysis.  Vertebral body heights are well maintained without evidence for fracture.  No marrow signal abnormality to suggest an infiltrative process.    Multilevel degenerative disc space narrowing desiccation most pronounced at L4-L5 and L5-S1.  Chronic degenerative endplate changes throughout the lumbar spine with mild superimposed edema at the posterior aspect of L4-L5.    Distal spinal cord demonstrates normal contour and signal intensity.  Cauda equina appears normal without findings to suggest arachnoiditis.  Conus medullaris " terminates at L1-L2.    Limited evaluation of the visualized intra-abdominal organs demonstrates no significant abnormalities.  SI joints are symmetric.  Paraspinal musculature demonstrates normal bulk and signal intensity.    T11-T12: Circumferential disc bulge causes mass effect on the ventral thecal sac.  Left facet arthropathy with a joint effusion and a 0.6 cm anteriorly oriented synovial cyst that encroaches into the left neural foramen.  No spinal canal stenosis.  Moderate left neural foraminal narrowing.    T12-L1: Circumferential disc bulge.  No spinal canal stenosis.  No neural foraminal narrowing.    L1-L2: Circumferential disc bulge causes mass effect on the ventral thecal sac and lateral recesses and encroaches into the bilateral foraminal zones.  No spinal canal stenosis.  No neural foraminal narrowing.    L2-L3: Circumferential disc bulge causes mass effect on the ventral thecal sac and lateral recesses and encroaches into the bilateral foraminal zones.  Left facet arthropathy and bilateral ligamentum flavum hypertrophy.  No spinal canal stenosis.  Mild left neural foraminal narrowing.    L3-L4: Circumferential disc bulge causes mass effect on the ventral thecal sac and left lateral recess and encroaches into the left foraminal zone.  Left facet arthropathy and bilateral ligamentum flavum hypertrophy.  No spinal canal stenosis.  Mild-to-moderate left neural foraminal narrowing.    L4-L5: Circumferential disc bulge causes mass effect on the ventral thecal sac and left lateral recess and encroaches into the bilateral foraminal zones.  Left facet arthropathy and left ligamentum flavum hypertrophy.  No spinal canal stenosis.  Mild-to-moderate bilateral neural foraminal narrowing.    L5-S1: Posterior broad-based disc bulge.  Bilateral facet arthropathy.  No spinal canal stenosis.  Mild-to-moderate right and mild left neural foraminal narrowing.  Impression: 1. Lumbar spondylosis as detailed above.   Multilevel mild-to-moderate neural foraminal narrowing.  No spinal canal stenosis.  2. Left facet arthropathy at T11-T12 with an associated joint effusion and an anteriorly oriented synovial cyst that contributes to moderate left neural foraminal narrowing with possible impingement of the exiting T11 nerve root, incompletely evaluated on this lumbar spine MRI.    Electronically signed by: Nelson Alvarenga MD  Date:    01/27/2024  Time:    08:57  X-Ray Lumbar Spine 5 View  Narrative: EXAMINATION:  XR LUMBAR SPINE COMPLETE 5 VIEW    CLINICAL HISTORY:  Radiculopathy, lumbar region    TECHNIQUE:  AP, lateral, spot and bilateral oblique views of the lumbar spine were performed.    COMPARISON:  Lumbar spine radiograph dated 10/11/2022    FINDINGS:  Lumbar dextrocurvature.  Lumbar spine vertebral body heights appear maintained.  Similar lumbar discogenic change with variable disc space narrowing most notable at L4-L5 and L5-S1.  Lower facet DJD.  No evidence for lytic or blastic lesion.  Mild abdominal aortic atherosclerosis.  Impression: As above.    Electronically signed by: Andrew Weinstein  Date:    01/27/2024  Time:    08:52          Latest PCP visits:      1/22/24 Hospital discharge f/u vertigo  10/02/23 HTN - increased dose of Micardis to 80-25  09/05/23 HTN - dc amlodipine, started patient on Micardis at low dose. .    Changes in health or medications: No    Specialists visits and recommendations:   10/30/23 Cardiology: added amlodipine 5 mg qd      H/o ER or Urgent care visits:   NO    H/o Hospitalizations:  NO    H/o falls: None     Life events / lifestyle:   Nothing new      Most recent / available laboratories reviewed with the patient:    Recent Labs   Lab 03/13/23  1013 01/08/24  0341 03/21/24  1049   WBC 4.85 6.08 5.88   Hemoglobin 13.3 12.6 13.8   Hematocrit 40.7 34.6 L 40.5   MCV 92 83 90   Platelets 208 202 242       Recent Labs   Lab 08/06/21  1647 09/29/21  1017 03/29/22  0957 09/13/22  1426  03/13/23  1013 01/08/24  0341 03/21/24  1049   Glucose 105 101 92   < > 105 133 H 100   Sodium 142 142 143   < > 143 140 141   Potassium 3.9 3.8 4.1   < > 4.1 3.7 4.2   BUN 12 13 14   < > 11 13 10   Creatinine 0.6 0.6 0.7   < > 0.6 0.6 0.6   eGFR if non African American >60.0 >60.0 >60.0  --   --   --   --    Total Bilirubin 1.2 H 1.0 1.1 H  --  0.7 0.6 1.0   AST 15 18 17  --  15 13 16   ALT 12 15 12  --  15 14 16    < > = values in this interval not displayed.       Recent Labs   Lab 09/13/22  1426 03/13/23  1013 03/21/24  1049   Hemoglobin A1C 5.7 H 5.9 H 5.9 H       Recent Labs   Lab 03/29/22  0957 03/13/23  1013 03/21/24  1049   Cholesterol 225 H 171 206 H   Triglycerides 121 64 108   HDL 57 52 44   LDL Cholesterol 143.8 106.2 140.4   Non-HDL Cholesterol 168 119 162       Recent Labs   Lab 03/26/21  1007 09/29/21  1017 03/13/23  1013   TSH 2.648 2.283 2.131               Current medications:    Current Outpatient Medications:     meclizine (ANTIVERT) 50 MG tablet, Take 50 mg by mouth 3 (three) times daily as needed. AS NEEDED, Disp: , Rfl:     amLODIPine (NORVASC) 5 MG tablet, Take 1 tablet (5 mg total) by mouth once daily., Disp: 90 tablet, Rfl: 3    atorvastatin (LIPITOR) 10 MG tablet, Take 1 tablet (10 mg total) by mouth once daily., Disp: 90 tablet, Rfl: 3    calcium-vitamin D3 (OS-MOLLY 500 + D3) 500 mg-5 mcg (200 unit) per tablet, Take 1 tablet by mouth 2 (two) times daily with meals., Disp: 180 tablet, Rfl: 3    meloxicam (MOBIC) 7.5 MG tablet, Take 1 tablet (7.5 mg total) by mouth daily as needed for Pain., Disp: 30 tablet, Rfl: 2    telmisartan-hydrochlorothiazide (MICARDIS HCT) 80-25 mg per tablet, Take 1 tablet by mouth once daily., Disp: 90 tablet, Rfl: 3      Healthcare Maintenance:  Colonoscopy: FOBT   Vaccinations: Pneumonia 2016, Zoster (no), Tetanus 2023  COVID vaccination: completed x 2  Depression screening: PHQ2 score = 0     Annual Wellness visit approx. Month: March ROS 11-point review of  "systems done. Negative except for detailed in the HPI.        Objective:      /70   Pulse 79   Ht 5' 2" (1.575 m)   Wt 62 kg (136 lb 11 oz)   SpO2 99%   BMI 25.00 kg/m²      Physical Exam   General appearance: Good general aspect, NAD, conversant   Eyes and HEENT: Normal sclerae, moist mucous membranes, no thyromegaly or lymphadenopathy  Respiratory: No work of breathing, clear to auscultation bilaterally. No rales, rhonchi, wheezing, or rubs.  Cardiovascular: PMI not displaced. RRR. Normal S1, S2. No murmurs, rubs or gallops.  Abdomen and : Soft, non-tender, nondistended, BS, no hepatosplenomegaly, no masses.  Extremities: no edemas, no extremity lymphadenopathy, no clubbing, no cyanosis.  Nervous System: Alert and oriented x 3, good concentration, no deficits.  Skin: Normal temperature, turgor and texture; no rash, ulcers or subcutaneous nodules  Psych: Appropriate affect, alert and oriented to person, place and time          Assessment:       1. Annual physical exam  Assessment & Plan:  Patient is in overall good general health.  Stable medical conditions listed on the PMH.  Labs reviewed and patient notified.        2. Primary hypertension  Assessment & Plan:  Controlled.  Will continue same management:  Valsartan 80 + HCTZ 25  Amlodipine 5 mg qd  Refilled    Recommended to monitor blood pressure regularly, eat a well-balanced diet that's low in salt, DASH diet, enjoy regular physical activity, manage stress, maintain a healthy weight, take medications properly.      Orders:  -     telmisartan-hydrochlorothiazide (MICARDIS HCT) 80-25 mg per tablet; Take 1 tablet by mouth once daily.  Dispense: 90 tablet; Refill: 3  -     amLODIPine (NORVASC) 5 MG tablet; Take 1 tablet (5 mg total) by mouth once daily.  Dispense: 90 tablet; Refill: 3    3. Lumbar radiculopathy  Assessment & Plan:  Unchanged.    Continue scheduling appointment with back and spine.  Continue NSAIDs for pain management, I will add " meloxicam to current management.    Orders:  -     Discontinue: meloxicam (MOBIC) 7.5 MG tablet; Take 1 tablet (7.5 mg total) by mouth daily as needed for Pain.  Dispense: 30 tablet; Refill: 2  -     meloxicam (MOBIC) 7.5 MG tablet; Take 1 tablet (7.5 mg total) by mouth daily as needed for Pain.  Dispense: 30 tablet; Refill: 2    4. Prediabetes  Assessment & Plan:  Lab Results   Component Value Date    HGBA1C 5.9 (H) 03/21/2024       Will manage with lifestyle modifications, diet, weight loss and physical activity.  Education provided.        5. Benign paroxysmal positional vertigo, unspecified laterality  Assessment & Plan:  Improved  Continue meclizine as needed      6. Mixed hyperlipidemia  Assessment & Plan:  Lab Results   Component Value Date    CHOL 206 (H) 03/21/2024    CHOL 171 03/13/2023    CHOL 225 (H) 03/29/2022     Lab Results   Component Value Date    HDL 44 03/21/2024    HDL 52 03/13/2023    HDL 57 03/29/2022     Lab Results   Component Value Date    LDLCALC 140.4 03/21/2024    LDLCALC 106.2 03/13/2023    LDLCALC 143.8 03/29/2022     Lab Results   Component Value Date    TRIG 108 03/21/2024    TRIG 64 03/13/2023    TRIG 121 03/29/2022       Lab Results   Component Value Date    CHOLHDL 21.4 03/21/2024    CHOLHDL 30.4 03/13/2023    CHOLHDL 25.3 03/29/2022     Continue statin      7. Pain in both hands  Assessment & Plan:  2/2 OA  Continue tylenol      8. Dyslipidemia  -     atorvastatin (LIPITOR) 10 MG tablet; Take 1 tablet (10 mg total) by mouth once daily.  Dispense: 90 tablet; Refill: 3    9. Hyperlipidemia, unspecified hyperlipidemia type  Assessment & Plan:  Lab Results   Component Value Date    CHOL 206 (H) 03/21/2024    CHOL 171 03/13/2023    CHOL 225 (H) 03/29/2022     Lab Results   Component Value Date    HDL 44 03/21/2024    HDL 52 03/13/2023    HDL 57 03/29/2022     Lab Results   Component Value Date    LDLCALC 140.4 03/21/2024    LDLCALC 106.2 03/13/2023    LDLCALC 143.8 03/29/2022     Lab  Results   Component Value Date    TRIG 108 03/21/2024    TRIG 64 03/13/2023    TRIG 121 03/29/2022       Lab Results   Component Value Date    CHOLHDL 21.4 03/21/2024    CHOLHDL 30.4 03/13/2023    CHOLHDL 25.3 03/29/2022     Continue statin    Orders:  -     atorvastatin (LIPITOR) 10 MG tablet; Take 1 tablet (10 mg total) by mouth once daily.  Dispense: 90 tablet; Refill: 3    10. Asymptomatic menopausal state  -     calcium-vitamin D3 (OS-MOLLY 500 + D3) 500 mg-5 mcg (200 unit) per tablet; Take 1 tablet by mouth 2 (two) times daily with meals.  Dispense: 180 tablet; Refill: 3    11. Right upper quadrant pain  Assessment & Plan:  Unclear etiology  Will obtain US liver    Orders:  -     US Abdomen Limited_Liver; Future; Expected date: 03/21/2024    12. Healthcare maintenance  Assessment & Plan:  Health care maintenance and core gaps reviewed and assessed with patient.  Vaccinations recommended:        Tetanus - 2023       Shingles - O       Influenza - 2023       Pneumonia - 2016  Colon cancer screening:   FOBT O  Lifestyle recommendations given.  Physical activity recommended.    Legend: Ordered (O), Declined (D), Current (C)      Orders:  -     Zoster Recombinant Vaccine; Future  -     Fecal Immunochemical Test (iFOBT); Future; Expected date: 03/21/2024       Summary of orders / plan:       All medications refilled  Zoster  FOBT  Schedule appointment with back and spine.    Problem list updated  Medications reconciled  Education provided  Lifestyle recommendations given  AVS generated, explained, and sent to the patient.  RTC in : 6 months for BP f/u, labs not needed            OLGA PHILLIPS MD, MPH  Internal Medicine  [facility]  [facility]

## 2024-03-21 NOTE — ASSESSMENT & PLAN NOTE
Lab Results   Component Value Date    HGBA1C 5.9 (H) 03/21/2024       Will manage with lifestyle modifications, diet, weight loss and physical activity.  Education provided.

## 2024-03-21 NOTE — ASSESSMENT & PLAN NOTE
Controlled.  Will continue same management:  Valsartan 80 + HCTZ 25  Amlodipine 5 mg qd  Refilled    Recommended to monitor blood pressure regularly, eat a well-balanced diet that's low in salt, DASH diet, enjoy regular physical activity, manage stress, maintain a healthy weight, take medications properly.

## 2024-03-21 NOTE — ASSESSMENT & PLAN NOTE
Unchanged.    Continue scheduling appointment with back and spine.  Continue NSAIDs for pain management, I will add meloxicam to current management.

## 2024-03-21 NOTE — ASSESSMENT & PLAN NOTE
Lab Results   Component Value Date    CHOL 206 (H) 03/21/2024    CHOL 171 03/13/2023    CHOL 225 (H) 03/29/2022     Lab Results   Component Value Date    HDL 44 03/21/2024    HDL 52 03/13/2023    HDL 57 03/29/2022     Lab Results   Component Value Date    LDLCALC 140.4 03/21/2024    LDLCALC 106.2 03/13/2023    LDLCALC 143.8 03/29/2022     Lab Results   Component Value Date    TRIG 108 03/21/2024    TRIG 64 03/13/2023    TRIG 121 03/29/2022       Lab Results   Component Value Date    CHOLHDL 21.4 03/21/2024    CHOLHDL 30.4 03/13/2023    CHOLHDL 25.3 03/29/2022     Continue statin

## 2024-03-21 NOTE — ASSESSMENT & PLAN NOTE
Health care maintenance and core gaps reviewed and assessed with patient.  Vaccinations recommended:        Tetanus - 2023       Shingles - O       Influenza - 2023       Pneumonia - 2016  Colon cancer screening:   FOBT O  Lifestyle recommendations given.  Physical activity recommended.    Legend: Ordered (O), Declined (D), Current (C)

## 2024-04-01 ENCOUNTER — HOSPITAL ENCOUNTER (OUTPATIENT)
Dept: RADIOLOGY | Facility: HOSPITAL | Age: 74
Discharge: HOME | End: 2024-04-01
Attending: STUDENT IN AN ORGANIZED HEALTH CARE EDUCATION/TRAINING PROGRAM
Payer: MEDICARE

## 2024-04-01 DIAGNOSIS — R10.11 RIGHT UPPER QUADRANT PAIN: ICD-10-CM

## 2024-04-01 PROCEDURE — 76705 ECHO EXAM OF ABDOMEN: CPT | Mod: 26,,, | Performed by: RADIOLOGY

## 2024-04-01 PROCEDURE — 76705 ECHO EXAM OF ABDOMEN: CPT | Mod: TC

## 2024-04-02 ENCOUNTER — HOSPITAL ENCOUNTER (OUTPATIENT)
Dept: RADIOLOGY | Facility: HOSPITAL | Age: 74
Discharge: HOME | End: 2024-04-02
Attending: STUDENT IN AN ORGANIZED HEALTH CARE EDUCATION/TRAINING PROGRAM
Payer: MEDICARE

## 2024-04-02 ENCOUNTER — TELEPHONE (OUTPATIENT)
Dept: INTERNAL MEDICINE | Facility: CLINIC | Age: 74
End: 2024-04-02
Payer: MEDICARE

## 2024-04-02 DIAGNOSIS — R92.2 INCONCLUSIVE MAMMOGRAM: ICD-10-CM

## 2024-04-02 PROCEDURE — 77066 DX MAMMO INCL CAD BI: CPT | Mod: TC

## 2024-04-02 PROCEDURE — 77062 BREAST TOMOSYNTHESIS BI: CPT | Mod: 26,,, | Performed by: RADIOLOGY

## 2024-04-02 PROCEDURE — 77066 DX MAMMO INCL CAD BI: CPT | Mod: 26,,, | Performed by: RADIOLOGY

## 2024-06-24 PROBLEM — Z00.00 ANNUAL PHYSICAL EXAM: Status: RESOLVED | Noted: 2024-03-21 | Resolved: 2024-06-24

## 2024-06-24 PROBLEM — Z00.00 HEALTHCARE MAINTENANCE: Status: RESOLVED | Noted: 2024-03-21 | Resolved: 2024-06-24

## 2024-06-25 DIAGNOSIS — M54.16 LUMBAR RADICULOPATHY: ICD-10-CM

## 2024-06-25 RX ORDER — MELOXICAM 7.5 MG/1
7.5 TABLET ORAL DAILY PRN
Qty: 30 TABLET | Refills: 2 | Status: SHIPPED | OUTPATIENT
Start: 2024-06-25

## 2024-07-01 ENCOUNTER — OFFICE VISIT (OUTPATIENT)
Dept: INTERNAL MEDICINE | Facility: CLINIC | Age: 74
End: 2024-07-01
Payer: MEDICARE

## 2024-07-01 VITALS
HEIGHT: 62 IN | SYSTOLIC BLOOD PRESSURE: 134 MMHG | WEIGHT: 135.56 LBS | DIASTOLIC BLOOD PRESSURE: 70 MMHG | HEART RATE: 80 BPM | BODY MASS INDEX: 24.95 KG/M2

## 2024-07-01 DIAGNOSIS — M54.16 LUMBAR RADICULOPATHY: Primary | ICD-10-CM

## 2024-07-01 PROCEDURE — 3008F BODY MASS INDEX DOCD: CPT | Mod: CPTII,S$GLB,, | Performed by: STUDENT IN AN ORGANIZED HEALTH CARE EDUCATION/TRAINING PROGRAM

## 2024-07-01 PROCEDURE — 3288F FALL RISK ASSESSMENT DOCD: CPT | Mod: CPTII,S$GLB,, | Performed by: STUDENT IN AN ORGANIZED HEALTH CARE EDUCATION/TRAINING PROGRAM

## 2024-07-01 PROCEDURE — 1160F RVW MEDS BY RX/DR IN RCRD: CPT | Mod: CPTII,S$GLB,, | Performed by: STUDENT IN AN ORGANIZED HEALTH CARE EDUCATION/TRAINING PROGRAM

## 2024-07-01 PROCEDURE — 3044F HG A1C LEVEL LT 7.0%: CPT | Mod: CPTII,S$GLB,, | Performed by: STUDENT IN AN ORGANIZED HEALTH CARE EDUCATION/TRAINING PROGRAM

## 2024-07-01 PROCEDURE — 1101F PT FALLS ASSESS-DOCD LE1/YR: CPT | Mod: CPTII,S$GLB,, | Performed by: STUDENT IN AN ORGANIZED HEALTH CARE EDUCATION/TRAINING PROGRAM

## 2024-07-01 PROCEDURE — 99214 OFFICE O/P EST MOD 30 MIN: CPT | Mod: S$GLB,,, | Performed by: STUDENT IN AN ORGANIZED HEALTH CARE EDUCATION/TRAINING PROGRAM

## 2024-07-01 PROCEDURE — 3078F DIAST BP <80 MM HG: CPT | Mod: CPTII,S$GLB,, | Performed by: STUDENT IN AN ORGANIZED HEALTH CARE EDUCATION/TRAINING PROGRAM

## 2024-07-01 PROCEDURE — 1125F AMNT PAIN NOTED PAIN PRSNT: CPT | Mod: CPTII,S$GLB,, | Performed by: STUDENT IN AN ORGANIZED HEALTH CARE EDUCATION/TRAINING PROGRAM

## 2024-07-01 PROCEDURE — 1159F MED LIST DOCD IN RCRD: CPT | Mod: CPTII,S$GLB,, | Performed by: STUDENT IN AN ORGANIZED HEALTH CARE EDUCATION/TRAINING PROGRAM

## 2024-07-01 PROCEDURE — 99999 PR PBB SHADOW E&M-EST. PATIENT-LVL IV: CPT | Mod: PBBFAC,,, | Performed by: STUDENT IN AN ORGANIZED HEALTH CARE EDUCATION/TRAINING PROGRAM

## 2024-07-01 PROCEDURE — 3075F SYST BP GE 130 - 139MM HG: CPT | Mod: CPTII,S$GLB,, | Performed by: STUDENT IN AN ORGANIZED HEALTH CARE EDUCATION/TRAINING PROGRAM

## 2024-07-01 RX ORDER — GABAPENTIN 100 MG/1
100 CAPSULE ORAL NIGHTLY
Qty: 90 CAPSULE | Refills: 3 | Status: SHIPPED | OUTPATIENT
Start: 2024-07-01 | End: 2025-07-01

## 2024-07-01 NOTE — PROGRESS NOTES
Subjective:       Patient ID: Nasima Pacheco is a 73 y.o. female.    Chief Complaint: Follow-up (Right lower back pain.)    Follow-up        Nasima Pacheco is a 73 y.o. female , German speaking, with a history of:  HTN  HLD  BPPV  OA  Back pain  Lumbar radiculopathy      [Local Patient]  Originally from [place]  Lives in: [place] [address]       Patient comes to the clinic complaining of back pain.    Patient reports that her lumbar back pain continues to be the same, moderate in intensity, radiating to the right hip, right thigh and right leg.  She also feels numbness and tingling of the right thigh and the right leg.  Lately she has not been able to sleep well because of the pain and the numbness.    She has been taking on and off Tylenol and meloxicam for pain.  She has not been seen by back and spine or has completed physical therapy.        01/26/2024 MRI spine findings:  FINDINGS:  Lumbar spine alignment demonstrates dextroscoliosis.  No spondylolisthesis.  No spondylolysis.  Vertebral body heights are well maintained without evidence for fracture.  No marrow signal abnormality to suggest an infiltrative process.     Multilevel degenerative disc space narrowing desiccation most pronounced at L4-L5 and L5-S1.  Chronic degenerative endplate changes throughout the lumbar spine with mild superimposed edema at the posterior aspect of L4-L5.     Distal spinal cord demonstrates normal contour and signal intensity.  Cauda equina appears normal without findings to suggest arachnoiditis.  Conus medullaris terminates at L1-L2.     Limited evaluation of the visualized intra-abdominal organs demonstrates no significant abnormalities.  SI joints are symmetric.  Paraspinal musculature demonstrates normal bulk and signal intensity.     T11-T12: Circumferential disc bulge causes mass effect on the ventral thecal sac.  Left facet arthropathy with a joint effusion and a 0.6 cm anteriorly oriented synovial cyst that  encroaches into the left neural foramen.  No spinal canal stenosis.  Moderate left neural foraminal narrowing.     T12-L1: Circumferential disc bulge.  No spinal canal stenosis.  No neural foraminal narrowing.     L1-L2: Circumferential disc bulge causes mass effect on the ventral thecal sac and lateral recesses and encroaches into the bilateral foraminal zones.  No spinal canal stenosis.  No neural foraminal narrowing.     L2-L3: Circumferential disc bulge causes mass effect on the ventral thecal sac and lateral recesses and encroaches into the bilateral foraminal zones.  Left facet arthropathy and bilateral ligamentum flavum hypertrophy.  No spinal canal stenosis.  Mild left neural foraminal narrowing.     L3-L4: Circumferential disc bulge causes mass effect on the ventral thecal sac and left lateral recess and encroaches into the left foraminal zone.  Left facet arthropathy and bilateral ligamentum flavum hypertrophy.  No spinal canal stenosis.  Mild-to-moderate left neural foraminal narrowing.     L4-L5: Circumferential disc bulge causes mass effect on the ventral thecal sac and left lateral recess and encroaches into the bilateral foraminal zones.  Left facet arthropathy and left ligamentum flavum hypertrophy.  No spinal canal stenosis.  Mild-to-moderate bilateral neural foraminal narrowing.     L5-S1: Posterior broad-based disc bulge.  Bilateral facet arthropathy.  No spinal canal stenosis.  Mild-to-moderate right and mild left neural foraminal narrowing.     Impression:     1. Lumbar spondylosis as detailed above.  Multilevel mild-to-moderate neural foraminal narrowing.  No spinal canal stenosis.  2. Left facet arthropathy at T11-T12 with an associated joint effusion and an anteriorly oriented synovial cyst that contributes to moderate left neural foraminal narrowing with possible impingement of the exiting T11 nerve root, incompletely evaluated on this lumbar spine MRI.      Latest PCP visits:      3/21/24  AWV  1/22/24 Hospital discharge f/u vertigo  10/02/23 HTN - increased dose of Micardis to 80-25  09/05/23 HTN - dc amlodipine, started patient on Micardis at low dose. .    Changes in health or medications: No    Specialists visits and recommendations:   10/30/23 Cardiology: added amlodipine 5 mg qd      H/o ER or Urgent care visits:   NO    H/o Hospitalizations:  NO    H/o falls: None     Life events / lifestyle:   Nothing new      Most recent / available laboratories reviewed with the patient:    Recent Labs   Lab 03/13/23  1013 01/08/24  0341 03/21/24  1049   WBC 4.85 6.08 5.88   Hemoglobin 13.3 12.6 13.8   Hematocrit 40.7 34.6 L 40.5   MCV 92 83 90   Platelets 208 202 242       Recent Labs   Lab 08/06/21  1647 09/29/21  1017 03/29/22  0957 09/13/22  1426 03/13/23  1013 01/08/24  0341 03/21/24  1049   Glucose 105 101 92   < > 105 133 H 100   Sodium 142 142 143   < > 143 140 141   Potassium 3.9 3.8 4.1   < > 4.1 3.7 4.2   BUN 12 13 14   < > 11 13 10   Creatinine 0.6 0.6 0.7   < > 0.6 0.6 0.6   eGFR if non African American >60.0 >60.0 >60.0  --   --   --   --    Total Bilirubin 1.2 H 1.0 1.1 H  --  0.7 0.6 1.0   AST 15 18 17  --  15 13 16   ALT 12 15 12  --  15 14 16    < > = values in this interval not displayed.       Recent Labs   Lab 09/13/22  1426 03/13/23  1013 03/21/24  1049   Hemoglobin A1C 5.7 H 5.9 H 5.9 H       Recent Labs   Lab 03/29/22  0957 03/13/23  1013 03/21/24  1049   Cholesterol 225 H 171 206 H   Triglycerides 121 64 108   HDL 57 52 44   LDL Cholesterol 143.8 106.2 140.4   Non-HDL Cholesterol 168 119 162       Recent Labs   Lab 09/29/21  1017 03/13/23  1013   TSH 2.283 2.131               Current medications:    Current Outpatient Medications:     amLODIPine (NORVASC) 5 MG tablet, Take 1 tablet (5 mg total) by mouth once daily., Disp: 90 tablet, Rfl: 3    atorvastatin (LIPITOR) 10 MG tablet, Take 1 tablet (10 mg total) by mouth once daily., Disp: 90 tablet, Rfl: 3    calcium-vitamin D3 (OS-MOLLY  "500 + D3) 500 mg-5 mcg (200 unit) per tablet, Take 1 tablet by mouth 2 (two) times daily with meals., Disp: 180 tablet, Rfl: 3    meclizine (ANTIVERT) 50 MG tablet, Take 12.5 mg by mouth 3 (three) times daily as needed. AS NEEDED, Disp: , Rfl:     meloxicam (MOBIC) 7.5 MG tablet, TAKE 1 TABLET BY MOUTH DAILY AS NEEDED FOR PAIN, Disp: 30 tablet, Rfl: 2    telmisartan-hydrochlorothiazide (MICARDIS HCT) 80-25 mg per tablet, Take 1 tablet by mouth once daily., Disp: 90 tablet, Rfl: 3    gabapentin (NEURONTIN) 100 MG capsule, Take 1 capsule (100 mg total) by mouth every evening., Disp: 90 capsule, Rfl: 3      Healthcare Maintenance:  Colonoscopy: FOBT   Vaccinations: Pneumonia 2016, Zoster (no), Tetanus 2023  COVID vaccination: completed x 2  Depression screening: PHQ2 score = 0     Annual Wellness visit approx. Month: March ROS  11-point review of systems done. Negative except for detailed in the HPI.        Objective:      /70   Pulse 80   Ht 5' 2" (1.575 m)   Wt 61.5 kg (135 lb 9.3 oz)   BMI 24.80 kg/m²      Physical Exam   General appearance: Good general aspect, NAD, conversant   Eyes and HEENT: Normal sclerae, moist mucous membranes, no thyromegaly or lymphadenopathy  Respiratory: No work of breathing, clear to auscultation bilaterally. No rales, rhonchi, wheezing, or rubs.  Cardiovascular: PMI not displaced. RRR. Normal S1, S2. No murmurs, rubs or gallops.  Abdomen and : Soft, non-tender, nondistended, BS, no hepatosplenomegaly, no masses.  Extremities: no edemas, no extremity lymphadenopathy, no clubbing, no cyanosis.  Nervous System: Alert and oriented x 3, good concentration, no deficits.  Skin: Normal temperature, turgor and texture; no rash, ulcers or subcutaneous nodules  Psych: Appropriate affect, alert and oriented to person, place and time          Assessment:       1. Lumbar radiculopathy  Assessment & Plan:  Unchanged and worsening symptoms.  Patient continues with back pain, radiating to " the right hip, right thigh, and all the way to the right foot.    This is preventing her from sleeping at night.  I have recommended continue Tylenol, meloxicam.    I am adding gabapentin.    I am referring patient to back and spine again.  Referral to PT and to the healthy back program    Orders:  -     Ambulatory referral/consult to Physical/Occupational Therapy; Future; Expected date: 07/08/2024  -     Ambulatory referral/consult to [facility]; Future; Expected date: 07/08/2024  -     gabapentin (NEURONTIN) 100 MG capsule; Take 1 capsule (100 mg total) by mouth every evening.  Dispense: 90 capsule; Refill: 3       Summary of orders / plan:       Referral to PT  Schedule appointment with [facility]  Referral to healthy back program  Gabapentin  Meloxicam      Problem list updated  Medications reconciled  Education provided  Lifestyle recommendations given  AVS generated, explained, and sent to the patient.  RTC in :September for BP f/u, labs not needed            OLGA PHILLIPS MD, MPH  Internal Medicine  [facility]  [facility]

## 2024-07-01 NOTE — ASSESSMENT & PLAN NOTE
12 hour chart check    Monitor Summary  SR [id number]  0.19/0.08/0.39         Unchanged and worsening symptoms.  Patient continues with back pain, radiating to the right hip, right thigh, and all the way to the right foot.    This is preventing her from sleeping at night.  I have recommended continue Tylenol, meloxicam.    I am adding gabapentin.    I am referring patient to back and spine again.  Referral to PT and to the healthy back program

## 2024-07-22 ENCOUNTER — DOCUMENTATION ONLY (OUTPATIENT)
Dept: REHABILITATION | Facility: HOSPITAL | Age: 74
End: 2024-07-22

## 2024-07-22 NOTE — PROGRESS NOTES
Pt no showed for scheduled [facility] evaluation / testing visits on both 7/18/2024 and 7/22/2024.  Attempted to contact patient, no answer, message left for call back to [facility] facility.    Shannon Randhawa, PT

## 2024-07-30 NOTE — PROGRESS NOTES
DATE: 2024  PATIENT: Nasima Pacheco    Supervising Physician: Sunny Patrick M.D.    CHIEF COMPLAINT: neck/upper back pain, left leg pain    HISTORY:  Nasima Pacheco is a 73 y.o. female here for initial evaluation of neck and upper back pain (Neck - 7, Arm - 0).  The pain has been present for years. The patient describes the pain as aching and cramping. The pain is worse with activity and improved by rest. There is negative associated numbness and tingling. There is negative subjective weakness. Prior treatments have included no PT, ESIs, surgery.     The patient also reports intermittent low back pain that radiates down the front of her left leg.    The patient denies myelopathic symptoms such as handwriting changes or difficulty with buttons/coins/keys. Denies perineal paresthesias, bowel/bladder dysfunction.    PAST MEDICAL/SURGICAL HISTORY:  Past Medical History:   Diagnosis Date    Arthritis     Cataract     Hyperlipidemia     Hypertension     Mild major depression, single episode 2023     Past Surgical History:   Procedure Laterality Date    CATARACT EXTRACTION W/  INTRAOCULAR LENS IMPLANT Right 2017    Dr. Mendez    CATARACT EXTRACTION W/  INTRAOCULAR LENS IMPLANT Left 2017    Dr. Mendez     SECTION      EYE SURGERY      HYSTERECTOMY      OOPHORECTOMY         Medications:   Current Outpatient Medications on File Prior to Visit   Medication Sig Dispense Refill    amLODIPine (NORVASC) 5 MG tablet Take 1 tablet (5 mg total) by mouth once daily. 90 tablet 3    atorvastatin (LIPITOR) 10 MG tablet Take 1 tablet (10 mg total) by mouth once daily. 90 tablet 3    calcium-vitamin D3 (OS-MOLLY 500 + D3) 500 mg-5 mcg (200 unit) per tablet Take 1 tablet by mouth 2 (two) times daily with meals. 180 tablet 3    gabapentin (NEURONTIN) 100 MG capsule Take 1 capsule (100 mg total) by mouth every evening. 90 capsule 3    meclizine (ANTIVERT) 50 MG tablet Take 12.5 mg by mouth 3 (three) times daily  as needed. AS NEEDED      meloxicam (MOBIC) 7.5 MG tablet TAKE 1 TABLET BY MOUTH DAILY AS NEEDED FOR PAIN 30 tablet 2    telmisartan-hydrochlorothiazide (MICARDIS HCT) 80-25 mg per tablet Take 1 tablet by mouth once daily. 90 tablet 3     No current facility-administered medications on file prior to visit.       Social History:   Social History     Socioeconomic History    Marital status:    Tobacco Use    Smoking status: Never    Smokeless tobacco: Never   Substance and Sexual Activity    Alcohol use: No    Drug use: Never    Sexual activity: Not Currently     Partners: Male     Social Determinants of Health     Financial Resource Strain: Low Risk  (5/24/2022)    Overall Financial Resource Strain (CARDIA)     Difficulty of Paying Living Expenses: Not hard at all   Food Insecurity: No Food Insecurity (5/24/2022)    Hunger Vital Sign     Worried About Running Out of Food in the Last Year: Never true     Ran Out of Food in the Last Year: Never true   Transportation Needs: No Transportation Needs (5/24/2022)    PRAPARE - Transportation     Lack of Transportation (Medical): No     Lack of Transportation (Non-Medical): No   Physical Activity: Inactive (9/13/2022)    Exercise Vital Sign     Days of Exercise per Week: 0 days     Minutes of Exercise per Session: 0 min   Stress: No Stress Concern Present (5/24/2022)    [employer] - Occupational Stress Questionnaire     Feeling of Stress : Not at all   Housing Stability: Unknown (5/24/2022)    Housing Stability Vital Sign     Unable to Pay for Housing in the Last Year: No     Unstable Housing in the Last Year: No       REVIEW OF SYSTEMS:  Constitution: Negative. Negative for chills, fever and night sweats.   Cardiovascular: Negative for chest pain and syncope.   Respiratory: Negative for cough and shortness of breath.   Gastrointestinal: See HPI. Negative for nausea/vomiting. Negative for abdominal pain.  Genitourinary: See HPI. Negative  for discoloration or dysuria.  Skin: Negative for dry skin, itching and rash.   Hematologic/Lymphatic: Negative for bleeding problem. Does not bruise/bleed easily.   Musculoskeletal: Negative for falls and muscle weakness.   Neurological: See HPI. No seizures.   Endocrine: Negative for polydipsia, polyphagia and polyuria.   Allergic/Immunologic: Negative for hives and persistent infections.     EXAM:  There were no vitals taken for this visit.    PHYSICAL EXAMINATION:    General: The patient is a very pleasant 73 y.o. female in no apparent distress, the patient is oriented to person, place and time.  Psych: Normal mood and affect  HEENT: Vision grossly intact, hearing intact to the spoken word.  Lungs: Respirations unlabored.  Gait: Normal station and gait, no difficulty with toe or heel walk.   Skin: Cervical skin and dorsal lumbar skin negative for rashes, lesions, hairy patches and surgical scars.    Range of motion: Cervical and lumbar range of motion is acceptable. There is mild tenderness to palpation of the paracervical muscles.  There is negative lumbar tenderness to palpation.  Spinal Balance: Global saggital and coronal spinal balance acceptable, no significant for scoliosis and kyphosis.  Musculoskeletal: No pain with the range of motion of the bilateral shoulders and elbows. Normal bulk and contour of the bilateral hands.  No pain with the range of motion of the bilateral hips. Mild bilateral trochanteric tenderness to palpation.  Vascular: Bilateral upper and lower extremities warm and well perfused, radial pulses 2+ bilaterally, dorsalis pedis pulses 2+ bilaterally.  Neurological: Normal strength and tone in all major motor groups in the bilateral upper and lower extremities. Normal sensation to light touch in the C5-T1 and L2-S1 dermatomes bilaterally.  Deep tendon reflexes symmetric 2+ in the bilateral upper and lower extremities.  Negative Inverted Radial Reflex and Reyes's bilaterally. Negative  Babinski bilaterally. Negative straight leg raise bilaterally.     IMAGING:   Today I personally reviewed AP, Lat and Flex/Ex  upright L-spine films that demonstrate scoliosis and significant degenerative changes.    MRI lumbar demonstrates lumbar spondylosis with multilevel mild-to-moderate neural foraminal narrowing.  No spinal canal stenosis. Left facet arthropathy at T11-T12 with an associated joint effusion and an anteriorly oriented synovial cyst that contributes to moderate left neural foraminal narrowing with possible impingement of the exiting T11 nerve root, incompletely evaluated on this lumbar spine MRI.    Xray thoracic demonstrates scoliosis with exaggeration of normal kyphosis    There is no height or weight on file to calculate BMI.    Hemoglobin A1C   Date Value Ref Range Status   03/21/2024 5.9 (H) 4.0 - 5.6 % Final     Comment:     ADA Screening Guidelines:  5.7-6.4%  Consistent with prediabetes  >or=6.5%  Consistent with diabetes    High levels of fetal hemoglobin interfere with the HbA1C  assay. Heterozygous hemoglobin variants (HbS, HgC, etc)do  not significantly interfere with this assay.   However, presence of multiple variants may affect accuracy.     03/13/2023 5.9 (H) 4.0 - 5.6 % Final     Comment:     ADA Screening Guidelines:  5.7-6.4%  Consistent with prediabetes  >or=6.5%  Consistent with diabetes    High levels of fetal hemoglobin interfere with the HbA1C  assay. Heterozygous hemoglobin variants (HbS, HgC, etc)do  not significantly interfere with this assay.   However, presence of multiple variants may affect accuracy.     09/13/2022 5.7 (H) 4.0 - 5.6 % Final     Comment:     ADA Screening Guidelines:  5.7-6.4%  Consistent with prediabetes  >or=6.5%  Consistent with diabetes    High levels of fetal hemoglobin interfere with the HbA1C  assay. Heterozygous hemoglobin variants (HbS, HgC, etc)do  not significantly interfere with this assay.   However, presence of multiple variants may  "affect accuracy.           ASSESSMENT/PLAN:    Nasima Clinton" was seen today for back pain.    Diagnoses and all orders for this visit:    DDD (degenerative disc disease), cervical  -     Ambulatory referral/consult to Physical/Occupational Therapy; Future        Today we discussed at length all of the different treatment options including anti-inflammatories, acetaminophen, rest, ice, heat, physical therapy including strengthening and stretching exercises, home exercises, ROM, aerobic conditioning, aqua therapy, other modalities including ultrasound, massage, and dry needling, epidural steroid injections and finally surgical intervention.      Pt presents with chronic neck pain and chronic lumbar radiculopathy. Will send PT orders to [facility]. Pt will fu if pain persists.      "

## 2024-07-31 ENCOUNTER — OFFICE VISIT (OUTPATIENT)
Dept: ORTHOPEDICS | Facility: CLINIC | Age: 74
End: 2024-07-31
Payer: MEDICARE

## 2024-07-31 DIAGNOSIS — M50.30 DDD (DEGENERATIVE DISC DISEASE), CERVICAL: Primary | ICD-10-CM

## 2024-07-31 PROCEDURE — 99999 PR PBB SHADOW E&M-EST. PATIENT-LVL III: CPT | Mod: PBBFAC,,, | Performed by: ORTHOPAEDIC SURGERY

## 2024-08-07 ENCOUNTER — CLINICAL SUPPORT (OUTPATIENT)
Dept: REHABILITATION | Facility: HOSPITAL | Age: 74
End: 2024-08-07
Payer: MEDICARE

## 2024-08-07 DIAGNOSIS — G89.29 CHRONIC NECK PAIN: Primary | ICD-10-CM

## 2024-08-07 DIAGNOSIS — M54.2 CHRONIC NECK PAIN: Primary | ICD-10-CM

## 2024-08-07 DIAGNOSIS — M54.16 LUMBAR RADICULOPATHY: ICD-10-CM

## 2024-08-07 PROCEDURE — 97530 THERAPEUTIC ACTIVITIES: CPT

## 2024-08-07 PROCEDURE — 97161 PT EVAL LOW COMPLEX 20 MIN: CPT

## 2024-08-14 ENCOUNTER — CLINICAL SUPPORT (OUTPATIENT)
Dept: REHABILITATION | Facility: HOSPITAL | Age: 74
End: 2024-08-14
Payer: MEDICARE

## 2024-08-14 DIAGNOSIS — G89.29 CHRONIC NECK PAIN: Primary | ICD-10-CM

## 2024-08-14 DIAGNOSIS — M54.2 CHRONIC NECK PAIN: Primary | ICD-10-CM

## 2024-08-14 PROCEDURE — 97112 NEUROMUSCULAR REEDUCATION: CPT

## 2024-08-14 PROCEDURE — 97140 MANUAL THERAPY 1/> REGIONS: CPT

## 2024-08-14 PROCEDURE — 97110 THERAPEUTIC EXERCISES: CPT

## 2024-08-14 NOTE — PROGRESS NOTES
"[facility]   Physical Therapy Treatment Note      Name: Nasima Pacheco  Clinic Number: [id number]    Therapy Diagnosis:   Encounter Diagnosis   Name Primary?    Chronic neck pain Yes     Physician: Suellen Rao MD    Visit Date: 8/14/2024    Physician Orders: PT Eval and Treat  Medical Diagnosis from Referral: M54.16 (ICD-10-CM) - Lumbar radiculopathy  Evaluation Date: 8/7/2024  Authorization Period Expiration: 12/31/2024  Plan of Care Expiration: 10/2/2024  Progress Note Due: 9/4/2024  Visit # / Visits authorized: 1/20 + EVAL   FOTO: 1/3     Precautions: Standard and HTN      Time In: 1402  Time Out: 1458  Total Billable Time: 56 minutes     Family member present as     Subjective     Patient reports: no pain at arrival but symptoms with activity have been moderate intensity. She admits that she has not been the most compliant with home exercise program prescription.    She was somewhat compliant with home exercise program.  Response to previous treatment: First follow-up  Functional change: First follow-up    Pain: 0/10  Location: upper back and bilateral upper traps     Objective      Cervical AROM: pain-free  Joint mobility: limited OA flexion and right sidebend  -Very guarded with sideglides so stopped    Treatment     Ines received the treatments listed below:      therapeutic exercises to develop strength, endurance, ROM, and flexibility for 24 minutes including:  Arm bike completed for 4' fwd and 4' back to increase ROM, endurance, and decrease pain to improve tolerance to ADLs and age-related activities  Seated thoracic extension: 25x with 3" holds  Sidelying open books: 20x   Assessment above    manual therapy techniques were applied for 12 minutes, including:  OA flexion mob, grade II-III  Prone CT junction/thoracic mobs, grade III    neuromuscular re-education activities to improve: Coordination, Kinesthetic, Sense, Proprioception, and Posture for 20 minutes. " "The following activities were included:  Supine chin tuck: 10x10"  No moneys against wall: 3x8 with 3" hold  Serratus wall slide: 3x8    Patient Education and Home Exercises       Education provided:   - Continue prior home exercise program    Written Home Exercises Provided: Pt instructed to continue prior HEP. Exercises were reviewed and Ines was able to demonstrate them prior to the end of the session.  Ines demonstrated good  understanding of the education provided. See Electronic Medical Record under Patient Instructions for exercises provided during therapy sessions    Assessment     Ines presents to first follow-up session with similar subjective complaints. She reports not being very compliant with prescribed home exercise program so educated patient on the importance of performing home exercises to independently manage condition. She demonstrates pain-free cervical range of motion but did have hypomobility of C0-C1. Continued to be unable to assess mid cervical spine mobility secondary to increased muscle guarding. Worked today to improve thoracic mobility, DNF activation, and periscapular mobility. She tolerated first treatment session fairly well with noted fatigue at end of visit. Will progress as able.    Ines Is progressing well towards her goals.   Patient prognosis is Fair.     Patient will continue to benefit from skilled outpatient physical therapy to address the deficits listed in the problem list box on initial evaluation, provide pt/family education and to maximize pt's level of independence in the home and community environment.     Patient's spiritual, cultural and educational needs considered and pt agreeable to plan of care and goals.     Anticipated barriers to physical therapy: advanced age, chronicity of condition, and language barrier    Goals:   Short Term Goals (4 weeks):  1. Patient will report decreased neck pain at worst to </= 6/10 to increase tolerance for activities of daily " living. Progressing, not met  2. Patient will improve cervical active range of motion by >/= 5 degrees where limited to improve mobility for activities of daily living. Progressing, not met  3. Patient will improve upper extremity manual muscle tests by 1/3 grade to improve strength for functional tasks. Progressing, not met  4. Patient will be compliant with home exercise program to supplement therapy in restoring functional mobility. Progressing, not met     Long Term Goals (8 weeks):   1. Patient will report decreased neck pain at worst to </= 3/10 to increase tolerance for activities of daily living. Progressing, not met  2. Patient will improve cervical rotation active range of motion to >/= 65 degree to improve mobility for driving. Progressing, not met  3. Patient will improve impaired upper extremity manual muscle tests to >/=4/5 to improve strength for functional tasks. Progressing, not met  4. Patient will improve FOTO score to >/= 66% to decrease perceived limitation with functional mobility. Progressing, not met  5. Patient goal: to decrease her upper back tightness and pain. Progressing, not met    Plan     Plan of care Certification: 8/7/2024 to 10/2/2024.     Outpatient Physical Therapy 2 times weekly for 8 weeks to include the following interventions: Electrical Stimulation , Manual Therapy, Moist Heat/ Ice, Neuromuscular Re-ed, Patient Education, Self Care, Therapeutic Activities, and Therapeutic Exercise.     Guillremo Bailon, PT, DPT

## 2024-08-19 PROBLEM — R53.1 DECREASED STRENGTH, ENDURANCE, AND MOBILITY: Status: RESOLVED | Noted: 2023-02-16 | Resolved: 2024-08-19

## 2024-08-19 PROBLEM — M53.86 DECREASED RANGE OF MOTION OF LUMBAR SPINE: Status: RESOLVED | Noted: 2023-02-16 | Resolved: 2024-08-19

## 2024-08-19 PROBLEM — M79.642 PAIN IN BOTH HANDS: Status: RESOLVED | Noted: 2020-07-01 | Resolved: 2024-08-19

## 2024-08-19 PROBLEM — M79.641 PAIN IN BOTH HANDS: Status: RESOLVED | Noted: 2020-07-01 | Resolved: 2024-08-19

## 2024-08-19 PROBLEM — G89.29 CHRONIC NECK PAIN: Status: ACTIVE | Noted: 2024-08-19

## 2024-08-19 PROBLEM — M54.2 CHRONIC NECK PAIN: Status: ACTIVE | Noted: 2024-08-19

## 2024-08-19 PROBLEM — Z74.09 DECREASED STRENGTH, ENDURANCE, AND MOBILITY: Status: RESOLVED | Noted: 2023-02-16 | Resolved: 2024-08-19

## 2024-08-19 PROBLEM — R68.89 DECREASED STRENGTH, ENDURANCE, AND MOBILITY: Status: RESOLVED | Noted: 2023-02-16 | Resolved: 2024-08-19

## 2024-08-19 NOTE — PLAN OF CARE
[facility]   Physical Therapy Initial Evaluation      Name: Nasima Pacheco  [facility] Number: [id number]    Therapy Diagnosis:   Encounter Diagnoses   Name Primary?    Lumbar radiculopathy     Chronic neck pain Yes        Physician: Suellen Rao MD    Physician Orders: PT Eval and Treat  Medical Diagnosis from Referral: M54.16 (ICD-10-CM) - Lumbar radiculopathy  Evaluation Date: 8/7/2024  Authorization Period Expiration: 12/31/2024  Plan of Care Expiration: 10/2/2024  Progress Note Due: 9/4/2024  Visit # / Visits authorized: 1/1   FOTO: 1/3    Precautions: Standard and HTN     Time In: 1300  Time Out: 1400  Total Billable Time: 60 minutes    Family member present as     Subjective     Date of onset: 2 years ago    History of current condition - Ines reports: she has chronic history of back pain. Her upper-mid back pain has been ongoing for about 2 years. She has had low back pain for over 10 years. She notes that upper back and upper traps feel very tight. She would like to focus more on her upper back at this time. Prolonged standing, rising from seated position, and housework (cooking, washing dishes, mopping, etc.) increase her upper back symptoms. She notes that walking will also sometimes exacerbate her pain. She reports some tingling in her upper back. She has some mild neck pain but denies any arm pain.    Falls: No    Imaging:   X-ray lumbar spine: Lumbar dextrocurvature. Lumbar spine vertebral body heights appear maintained. Similar lumbar discogenic change with variable disc space narrowing most notable at L4-L5 and L5-S1. Lower facet DJD. No evidence for lytic or blastic lesion. Mild abdominal aortic atherosclerosis.  MRI lumbar spine:   Lumbar spine alignment demonstrates dextroscoliosis.  No spondylolisthesis.  No spondylolysis.  Vertebral body heights are well maintained without evidence for fracture.  No marrow signal abnormality to suggest an infiltrative  process.  Multilevel degenerative disc space narrowing desiccation most pronounced at L4-L5 and L5-S1.  Chronic degenerative endplate changes throughout the lumbar spine with mild superimposed edema at the posterior aspect of L4-L5.  Distal spinal cord demonstrates normal contour and signal intensity.  Cauda equina appears normal without findings to suggest arachnoiditis.  Conus medullaris terminates at L1-L2.  Limited evaluation of the visualized intra-abdominal organs demonstrates no significant abnormalities.  SI joints are symmetric.  Paraspinal musculature demonstrates normal bulk and signal intensity.  T11-T12: Circumferential disc bulge causes mass effect on the ventral thecal sac.  Left facet arthropathy with a joint effusion and a 0.6 cm anteriorly oriented synovial cyst that encroaches into the left neural foramen.  No spinal canal stenosis.  Moderate left neural foraminal narrowing.  T12-L1: Circumferential disc bulge.  No spinal canal stenosis.  No neural foraminal narrowing.  L1-L2: Circumferential disc bulge causes mass effect on the ventral thecal sac and lateral recesses and encroaches into the bilateral foraminal zones.  No spinal canal stenosis.  No neural foraminal narrowing.  L2-L3: Circumferential disc bulge causes mass effect on the ventral thecal sac and lateral recesses and encroaches into the bilateral foraminal zones.  Left facet arthropathy and bilateral ligamentum flavum hypertrophy.  No spinal canal stenosis.  Mild left neural foraminal narrowing.  L3-L4: Circumferential disc bulge causes mass effect on the ventral thecal sac and left lateral recess and encroaches into the left foraminal zone.  Left facet arthropathy and bilateral ligamentum flavum hypertrophy.  No spinal canal stenosis.  Mild-to-moderate left neural foraminal narrowing.  L4-L5: Circumferential disc bulge causes mass effect on the ventral thecal sac and left lateral recess and encroaches into the bilateral foraminal  zones.  Left facet arthropathy and left ligamentum flavum hypertrophy.  No spinal canal stenosis.  Mild-to-moderate bilateral neural foraminal narrowing.  L5-S1: Posterior broad-based disc bulge.  Bilateral facet arthropathy.  No spinal canal stenosis.  Mild-to-moderate right and mild left neural foraminal narrowing.    Prior Therapy: Yes but specifically for lower back  Social History: Lives with her  in a 1-story home with 0 ANUP  Occupation: Retired  Prior Level of Function: Chronic condition  Current Level of Function: Increased neck and low back pain limiting activities of daily living    Pain:  Current 0/10, worst 9/10, best 0/10   Location: upper back and bilateral upper trap  Description: Burning  Aggravating Factors: Prolonged standing, transitional movements, housework  Easing Factors: Pain medication, Attempting to not think about    Patients goals: to decrease her upper back tightness and pain     Medical History:   Past Medical History:   Diagnosis Date    Arthritis     Cataract     Hyperlipidemia     Hypertension     Mild major depression, single episode 2023       Surgical History:   Nasima Pacheco  has a past surgical history that includes Hysterectomy; Eye surgery; Cataract extraction w/  intraocular lens implant (Right, 2017); Cataract extraction w/  intraocular lens implant (Left, 2017); Oophorectomy; and  section.    Medications:   Nasima has a current medication list which includes the following prescription(s): amlodipine, atorvastatin, calcium-vitamin d3, gabapentin, meclizine, meloxicam, and telmisartan-hydrochlorothiazide.    Allergies:   Review of patient's allergies indicates:  No Known Allergies     Objective      Observation: AAOx3    Posture: bilateral scapular downward rotation, internal rotation, anterior tip; right scapular depression    Cervical Range of Motion:    Degrees Pain   Flexion 60 no     Extension 45 no     Left Rotation 52 no    "  Right Rotation 54 no     Left Side Bending 30 no   Right Side Bending 30 no   C0-C1 Flex Limited no   C0-C1 Ext WNL no   C0-C1 Sidebending Limited right no   C1-2 Rotation Limited bilateral  Pain contralateral bilaterally   C1-3 Rotation Limited left no      Shoulder Range of Motion: WNL, pain-free    Upper Extremity Strength   Left Right   Shoulder flexion: 4-/5 4-/5   Shoulder abduction: 4-/5 4-/5   Shoulder ER 4-/5 4-/5   Shoulder IR 4/5 4/5   Serratus Anterior NT NT   Middle Trap NT NT   Low Trap NT NT       Special Tests: Not performed    Cervical joint mobility: unable to fully assess sideglides secondary to muscle guarding, limited CT junction mobility     Thoracic mobility: limited upper-mid thoracic mobility    Palpation: tenderness bilateral upper traps    Sensation: intact    Flexibility: not assessed      Intake Outcome Measure for FOTO Lumbar Survey    Therapist reviewed FOTO scores for Nasima Pacheco on 8/7/2024.   FOTO report - see Media section or FOTO account episode details.    Intake Score: 62%         Treatment     Total Treatment time (time-based codes) separate from Evaluation: 11 minutes     Ines received the treatments listed below:      therapeutic activities to improve functional performance for 11 minutes, including:  Home exercise program review:   Seated thoracic extension: 10x with 3" holds  Supine chin tucks: 10x with 5" holds    Patient Education and Home Exercises     Education provided:   - Diagnosis and prognosis  - Plan of care  - Home exercise program    Written Home Exercises Provided: Yes. Exercises were reviewed and Ines was able to demonstrate them prior to the end of the session.  Ines demonstrated good  understanding of the education provided. See EMR under Patient Instructions for exercises provided during therapy sessions.    Assessment     Nasima is a 74 y.o. female referred to outpatient Physical Therapy with a medical diagnosis of M54.16 (ICD-10-CM) - Lumbar " radiculopathy. Patient presents with chronic upper and lower back pain. She reports that she would like to focus more on upper back/neck condition at this time. She demonstrates decreased cervical range of motion, decreased CT junction/thoracic mobility, decreased upper extremity strength, postural deficits, and muscle guarding. These limitations are affecting her performance of activities of daily living and leisure activities. Plan to work on improving thoracic mobility, DNF activation, and periscapular strength with goal of maximizing her functional mobility and quality of life. Educated patient on the nature of chronic pain and that the goal is to improve her function and not necessarily to decrease her pain to 0/10. She verbalized understanding.    Patient prognosis is Fair.   Patient will benefit from skilled outpatient Physical Therapy to address the deficits stated above and in the chart below, provide patient /family education, and to maximize patientt's level of independence.     Plan of care discussed with patient: Yes  Patient's spiritual, cultural and educational needs considered and patient is agreeable to the plan of care and goals as stated below:     Anticipated Barriers for therapy: advanced age, chronicity of condition, and language barrier    Medical Necessity is demonstrated by the following  History  Co-morbidities and personal factors that may impact the plan of care [] LOW: no personal factors / co-morbidities  [] MODERATE: 1-2 personal factors / co-morbidities  [x] HIGH: 3+ personal factors / co-morbidities    Moderate / High Support Documentation:   Co-morbidities affecting plan of care: Arthritis, Back pain, BMI over 30, High Blood Pressure    Personal Factors:   age     Examination  Body Structures and Functions, activity limitations and participation restrictions that may impact the plan of care [] LOW: addressing 1-2 elements  [x] MODERATE: 3+ elements  [] HIGH: 4+ elements (please  support below)    Moderate / High Support Documentation: range of motion, strength, posture     Clinical Presentation [x] LOW: stable  [] MODERATE: Evolving  [] HIGH: Unstable     Decision Making/ Complexity Score: low       Goals:  Short Term Goals (4 weeks):  1. Patient will report decreased neck pain at worst to </= 6/10 to increase tolerance for activities of daily living.   2. Patient will improve cervical active range of motion by >/= 5 degrees where limited to improve mobility for activities of daily living.   3. Patient will improve upper extremity manual muscle tests by 1/3 grade to improve strength for functional tasks.   4. Patient will be compliant with home exercise program to supplement therapy in restoring functional mobility.     Long Term Goals (8 weeks):   1. Patient will report decreased neck pain at worst to </= 3/10 to increase tolerance for activities of daily living.   2. Patient will improve cervical rotation active range of motion to >/= 65 degree to improve mobility for driving.   3. Patient will improve impaired upper extremity manual muscle tests to >/=4/5 to improve strength for functional tasks.   4. Patient will improve FOTO score to >/= 66% to decrease perceived limitation with functional mobility.   5. Patient goal: to decrease her upper back tightness and pain.     Plan     Plan of care Certification: 8/7/2024 to 10/2/2024.    Outpatient Physical Therapy 2 times weekly for 8 weeks to include the following interventions: Electrical Stimulation , Manual Therapy, Moist Heat/ Ice, Neuromuscular Re-ed, Patient Education, Self Care, Therapeutic Activities, and Therapeutic Exercise.     Guillermo Bailon, PT, DPT        Physician's Signature: _________________________________________ Date: ________________

## 2024-08-22 ENCOUNTER — CLINICAL SUPPORT (OUTPATIENT)
Dept: REHABILITATION | Facility: HOSPITAL | Age: 74
End: 2024-08-22
Payer: MEDICARE

## 2024-08-22 DIAGNOSIS — G89.29 CHRONIC NECK PAIN: Primary | ICD-10-CM

## 2024-08-22 DIAGNOSIS — M54.2 CHRONIC NECK PAIN: Primary | ICD-10-CM

## 2024-08-22 PROCEDURE — 97110 THERAPEUTIC EXERCISES: CPT

## 2024-08-22 PROCEDURE — 97112 NEUROMUSCULAR REEDUCATION: CPT

## 2024-08-22 PROCEDURE — 97140 MANUAL THERAPY 1/> REGIONS: CPT

## 2024-08-22 NOTE — PROGRESS NOTES
"[facility]   Physical Therapy Treatment Note      Name: Nasima Pacheco  Clinic Number: [id number]    Therapy Diagnosis:   Encounter Diagnosis   Name Primary?    Chronic neck pain Yes     Physician: Suellen Rao MD    Visit Date: 8/22/2024    Physician Orders: PT Eval and Treat  Medical Diagnosis from Referral: M54.16 (ICD-10-CM) - Lumbar radiculopathy  Evaluation Date: 8/7/2024  Authorization Period Expiration: 12/31/2024  Plan of Care Expiration: 10/2/2024  Progress Note Due: 9/4/2024  Visit # / Visits authorized: 2/20 + EVAL   FOTO: 1/3     Precautions: Standard and HTN      Time In: 1401  Time Out: 1459  Total Billable Time: 58 minutes     Family member present as     Subjective     Patient reports: continued moderate pain with activities of daily living. She would like to hold off on CT junction/thoracic mobilizations performed in prone.    She was somewhat compliant with home exercise program.  Response to previous treatment: no exacerbation of symptoms  Functional change: no significant change at this time    Pain: 0/10  Location: upper back and bilateral upper traps     Objective      Cervical AROM: pain-free  Joint mobility: limited OA flexion and right C4-5 and C6-7    Treatment     Ines received the treatments listed below:      therapeutic exercises to develop strength, endurance, ROM, and flexibility for 22 minutes including:  Arm bike completed for 4' fwd and 4' back to increase ROM, endurance, and decrease pain to improve tolerance to ADLs and age-related activities  Seated thoracic extension: 20x with 3" holds  Sidelying open books: 20x ea  Assessment above    manual therapy techniques were applied for 10 minutes, including:  OA flexion mob, grade III  Left sideglides C4-5 and C6-7, grade III    Not today:  Prone CT junction/thoracic mobs, grade III    neuromuscular re-education activities to improve: Coordination, Kinesthetic, Sense, Proprioception, and " "Posture for 26 minutes. The following activities were included:    Upper trap touchdowns: 3x8 with 3" hold  No moneys against wall: 3x8 with 3" hold  Serratus wall slide: 3x8  Serratus landmine press: 3.5# dowel, 3x8 ea    Not today:  Supine chin tuck: 10x10"    Patient Education and Home Exercises       Education provided:   - Continue prior home exercise program    Written Home Exercises Provided: Pt instructed to continue prior HEP. Exercises were reviewed and Ines was able to demonstrate them prior to the end of the session.  Ines demonstrated good  understanding of the education provided. See Electronic Medical Record under Patient Instructions for exercises provided during therapy sessions    Assessment     Ms. Chacon presents to therapy with similar symptomology as prior visit. She verbalized not wanting have CT junction and thoracic mobilizations performed due to techniques increasing her pain. Patient demonstrated much less muscle guarding today, so was able to assess mid cervical joint mobility, which revealed restrictions of her right C4-5 and C6-7 facets. Manual interventions were performed to improve this restriction as well as OA flexion. Continued with thoracic mobility and progressed periscapular strengthening today. Good tolerance was demonstrated to treatment. Will continue to progress as tolerated.    Ines Is progressing well towards her goals.   Patient prognosis is Fair.     Patient will continue to benefit from skilled outpatient physical therapy to address the deficits listed in the problem list box on initial evaluation, provide pt/family education and to maximize pt's level of independence in the home and community environment.     Patient's spiritual, cultural and educational needs considered and pt agreeable to plan of care and goals.     Anticipated barriers to physical therapy: advanced age, chronicity of condition, and language barrier    Goals:   Short Term Goals (4 weeks):  1. Patient " will report decreased neck pain at worst to </= 6/10 to increase tolerance for activities of daily living. Progressing, not met  2. Patient will improve cervical active range of motion by >/= 5 degrees where limited to improve mobility for activities of daily living. Progressing, not met  3. Patient will improve upper extremity manual muscle tests by 1/3 grade to improve strength for functional tasks. Progressing, not met  4. Patient will be compliant with home exercise program to supplement therapy in restoring functional mobility. Progressing, not met     Long Term Goals (8 weeks):   1. Patient will report decreased neck pain at worst to </= 3/10 to increase tolerance for activities of daily living. Progressing, not met  2. Patient will improve cervical rotation active range of motion to >/= 65 degree to improve mobility for driving. Progressing, not met  3. Patient will improve impaired upper extremity manual muscle tests to >/=4/5 to improve strength for functional tasks. Progressing, not met  4. Patient will improve FOTO score to >/= 66% to decrease perceived limitation with functional mobility. Progressing, not met  5. Patient goal: to decrease her upper back tightness and pain. Progressing, not met    Plan     Plan of care Certification: 8/7/2024 to 10/2/2024.     Outpatient Physical Therapy 2 times weekly for 8 weeks to include the following interventions: Electrical Stimulation , Manual Therapy, Moist Heat/ Ice, Neuromuscular Re-ed, Patient Education, Self Care, Therapeutic Activities, and Therapeutic Exercise.     Guillermo Bailon, PT, DPT

## 2024-08-27 ENCOUNTER — CLINICAL SUPPORT (OUTPATIENT)
Dept: REHABILITATION | Facility: HOSPITAL | Age: 74
End: 2024-08-27
Payer: MEDICARE

## 2024-08-27 DIAGNOSIS — G89.29 CHRONIC NECK PAIN: Primary | ICD-10-CM

## 2024-08-27 DIAGNOSIS — M54.2 CHRONIC NECK PAIN: Primary | ICD-10-CM

## 2024-08-27 PROCEDURE — 97110 THERAPEUTIC EXERCISES: CPT | Mod: KX

## 2024-08-27 PROCEDURE — 97112 NEUROMUSCULAR REEDUCATION: CPT | Mod: KX

## 2024-08-27 NOTE — PROGRESS NOTES
"[facility]   Physical Therapy Treatment Note      Name: Nasima Pacheco  Clinic Number: [id number]    Therapy Diagnosis:   Encounter Diagnosis   Name Primary?    Chronic neck pain Yes     Physician: Suellen Rao MD    Visit Date: 8/27/2024    Physician Orders: PT Eval and Treat  Medical Diagnosis from Referral: M54.16 (ICD-10-CM) - Lumbar radiculopathy  Evaluation Date: 8/7/2024  Authorization Period Expiration: 12/31/2024  Plan of Care Expiration: 10/2/2024  Progress Note Due: 9/4/2024  Visit # / Visits authorized: 3/20 + EVAL   FOTO: 1/3     Precautions: Standard and HTN      Time In: 1400  Time Out: 1459  Total Billable Time: 29 minutes     Family member present as     Subjective     Patient reports: she is feeling a bit better today compared to last visit.    She was somewhat compliant with home exercise program.  Response to previous treatment: no exacerbation of symptoms  Functional change: no significant change at this time    Pain: 0/10  Location: upper back and bilateral upper traps     Objective      Cervical AROM: pain-free  Joint mobility: limited OA flexion and right C4-5; limited posterior GH glide bilaterally    Treatment     Ines received the treatments listed below:      therapeutic exercises to develop strength, endurance, ROM, and flexibility for 22 minutes including:  Arm bike completed for 4' fwd and 4' back to increase ROM, endurance, and decrease pain to improve tolerance to ADLs and age-related activities  Seated thoracic extension: 20x with 3" holds  Sidelying open books: 20x ea  Assessment above    manual therapy techniques were applied for 13 minutes, including:  OA flexion mob, grade III  Left sideglides C4-5, grade III  Posterior GH mob, grade III bilateral    Not today:  Prone CT junction/thoracic mobs, grade III    neuromuscular re-education activities to improve: Coordination, Kinesthetic, Sense, Proprioception, and Posture for 24 minutes. " "The following activities were included:  Supine chin tuck: 10x10"  Prone chicken wings: 3x8  No moneys against wall: green theraband, 3x8 with 3" hold  Upper trap shrugs: level 3, 3x8 with 3" hold    Not today:  Serratus wall slide: 3x8  Serratus landmine press: 3.5# dowel, 3x8 ea    Patient Education and Home Exercises       Education provided:   - Continue prior home exercise program    Written Home Exercises Provided: Pt instructed to continue prior HEP. Exercises were reviewed and Ines was able to demonstrate them prior to the end of the session.  Ines demonstrated good  understanding of the education provided. See Electronic Medical Record under Patient Instructions for exercises provided during therapy sessions    Assessment     Ms. Chacon presents to session with note of slight improvements in subjective symptoms. Cervical active range of motion remains pain-free. Manual interventions continue to be performed to address restriction in upper and mid cervicamobility. Added GH mobilizations secondary to patient report of increased pain with shoulder movements and mobility deficits demonstrated. Continued with DNF activation and periscapular strengthening. She tolerated treatment interventions fairly well. Will continue to progress as able.    Ines Is progressing well towards her goals.   Patient prognosis is Fair.     Patient will continue to benefit from skilled outpatient physical therapy to address the deficits listed in the problem list box on initial evaluation, provide pt/family education and to maximize pt's level of independence in the home and community environment.     Patient's spiritual, cultural and educational needs considered and pt agreeable to plan of care and goals.     Anticipated barriers to physical therapy: advanced age, chronicity of condition, and language barrier    Goals:   Short Term Goals (4 weeks):  1. Patient will report decreased neck pain at worst to </= 6/10 to increase tolerance " for activities of daily living. Progressing, not met  2. Patient will improve cervical active range of motion by >/= 5 degrees where limited to improve mobility for activities of daily living. Progressing, not met  3. Patient will improve upper extremity manual muscle tests by 1/3 grade to improve strength for functional tasks. Progressing, not met  4. Patient will be compliant with home exercise program to supplement therapy in restoring functional mobility. Progressing, not met     Long Term Goals (8 weeks):   1. Patient will report decreased neck pain at worst to </= 3/10 to increase tolerance for activities of daily living. Progressing, not met  2. Patient will improve cervical rotation active range of motion to >/= 65 degree to improve mobility for driving. Progressing, not met  3. Patient will improve impaired upper extremity manual muscle tests to >/=4/5 to improve strength for functional tasks. Progressing, not met  4. Patient will improve FOTO score to >/= 66% to decrease perceived limitation with functional mobility. Progressing, not met  5. Patient goal: to decrease her upper back tightness and pain. Progressing, not met    Plan     Plan of care Certification: 8/7/2024 to 10/2/2024.     Outpatient Physical Therapy 2 times weekly for 8 weeks to include the following interventions: Electrical Stimulation , Manual Therapy, Moist Heat/ Ice, Neuromuscular Re-ed, Patient Education, Self Care, Therapeutic Activities, and Therapeutic Exercise.     Guillermo Bailon, PT, DPT

## 2024-09-09 ENCOUNTER — CLINICAL SUPPORT (OUTPATIENT)
Dept: REHABILITATION | Facility: HOSPITAL | Age: 74
End: 2024-09-09
Payer: MEDICARE

## 2024-09-09 DIAGNOSIS — G89.29 CHRONIC NECK PAIN: Primary | ICD-10-CM

## 2024-09-09 DIAGNOSIS — M54.2 CHRONIC NECK PAIN: Primary | ICD-10-CM

## 2024-09-09 PROCEDURE — 97110 THERAPEUTIC EXERCISES: CPT | Mod: KX

## 2024-09-09 PROCEDURE — 97140 MANUAL THERAPY 1/> REGIONS: CPT | Mod: KX

## 2024-09-09 PROCEDURE — 97112 NEUROMUSCULAR REEDUCATION: CPT | Mod: KX

## 2024-09-09 NOTE — PROGRESS NOTES
[facility]   Physical Therapy Progress Note      Name: Nasima Pacheco  [facility] Number: [id number]    Therapy Diagnosis:   Encounter Diagnosis   Name Primary?    Chronic neck pain Yes     Physician: Suellen Rao MD    Visit Date: 9/9/2024    Physician Orders: PT Eval and Treat  Medical Diagnosis from Referral: M54.16 (ICD-10-CM) - Lumbar radiculopathy  Evaluation Date: 8/7/2024  Authorization Period Expiration: 12/31/2024  Plan of Care Expiration: 10/2/2024  Progress Note Due: 10/2/2024  Visit # / Visits authorized: 4/20 + EVAL   FOTO: 1/3     Precautions: Standard and HTN      Time In: 1302  Time Out: 1400  Total Billable Time: 58 minutes     Family member present as     Subjective     Patient reports: no neck pain at arrival. However, she states that her shoulders hurt whenever she reaches overhead.    She was somewhat compliant with home exercise program.  Response to previous treatment: no exacerbation of symptoms  Functional change: no significant change at this time    Pain: 0/10  Location: upper back and bilateral upper traps     Objective      Cervical Range of Motion:     Degrees Pain   Flexion 60 no      Extension 50 no      Left Rotation 55 no      Right Rotation 55 no      Left Side Bending 30 no   Right Side Bending 35 no      Shoulder Range of Motion: upper back and right lateral arm pain with right shoulder abduction     Upper Extremity Strength    Left Right   Shoulder flexion: 4-/5 4-/5*   Shoulder abduction: 4-/5 4-/5*   Shoulder ER 4-/5 4-/5   Shoulder IR 4/5 4/5*   Serratus Anterior 3+/5 3+/5   Middle Trap NT NT   Low Trap NT NT      Special Tests:  Impingement Cluster:   Left Right   Hawkin's Ramy - +   Painful Arc - +   Resisted ER - -     Treatment     Ines received the treatments listed below:      therapeutic exercises to develop strength, endurance, ROM, and flexibility for 18 minutes including:  Arm bike completed for 4' fwd and 4' back to  "increase ROM, endurance, and decrease pain to improve tolerance to ADLs and age-related activities  Seated thoracic extension: 20x with 3" holds  Assessment above    Not today:  Sidelying open books: 20x ea    manual therapy techniques were applied for 10 minutes, including:  Posterior and inferior GH glide, grade III bilateral    Not today:  OA flexion mob, grade III  Left sideglides C4-5 and C6-7, grade III  Prone CT junction/thoracic mobs, grade III    neuromuscular re-education activities to improve: Coordination, Kinesthetic, Sense, Proprioception, and Posture for 30 minutes. The following activities were included:  ER walkouts arm at side: green theraband, 3x8 with 3" holds right   IR walkouts arm at side: blue theraband, 3x8 with 3" holds right   Serratus wall slide with liftoff: 3x8  Upper trap shrugs: level 3, 3x8 with 3" hold  Sidelying ER: 1#, 3x8 right     Not today:  Supine chin tuck: 10x10"  Prone chicken wings: 3x8  No moneys against wall: green theraband, 3x8 with 3" hold  Serratus landmine press: 3.5# dowel, 3x8 ea    Patient Education and Home Exercises       Education provided:   - Continue prior home exercise program  - Added rotator cuff isometric walkouts    Written Home Exercises Provided: Pt instructed to continue prior HEP. Exercises were reviewed and Ines was able to demonstrate them prior to the end of the session.  Ines demonstrated good  understanding of the education provided. See Electronic Medical Record under Patient Instructions for exercises provided during therapy sessions    Assessment     Ms. Chacon hs been seen for 4 visits since initial evaluation. She has improved with cervical extension and right sidebending range of motion. Subjectively, patient notes improvement in neck and upper back pain. However, she reports new complaints of lateral shoulder pain with overhead activity. Shoulder active range of motion is symmetrical; however, right elevation did recreate her pain. " Furthermore, she had 2/3 positives with rotator cuff impingement special tests. Secondary to these objective findings, belief is that she likely does have rotator cuff pathology. Therapeutic interventions were focused on improving her rotator cuff and periscapular strength. She remains a good candidate for physical therapy to work on improving the above deficits.    Ines Is progressing well towards her goals.   Patient prognosis is Fair.     Patient will continue to benefit from skilled outpatient physical therapy to address the deficits listed in the problem list box on initial evaluation, provide pt/family education and to maximize pt's level of independence in the home and community environment.     Patient's spiritual, cultural and educational needs considered and pt agreeable to plan of care and goals.     Anticipated barriers to physical therapy: advanced age, chronicity of condition, and language barrier    Goals:   Short Term Goals (4 weeks):  1. Patient will report decreased neck pain at worst to </= 6/10 to increase tolerance for activities of daily living. Met  2. Patient will improve cervical active range of motion by >/= 5 degrees where limited to improve mobility for activities of daily living. Progressing, not met  3. Patient will improve upper extremity manual muscle tests by 1/3 grade to improve strength for functional tasks. Progressing, not met  4. Patient will be compliant with home exercise program to supplement therapy in restoring functional mobility. Progressing, not met     Long Term Goals (8 weeks):   1. Patient will report decreased neck pain at worst to </= 3/10 to increase tolerance for activities of daily living. Met  2. Patient will improve cervical rotation active range of motion to >/= 65 degree to improve mobility for driving. Progressing, not met  3. Patient will improve impaired upper extremity manual muscle tests to >/=4/5 to improve strength for functional tasks. Progressing,  not met  4. Patient will improve FOTO score to >/= 66% to decrease perceived limitation with functional mobility. Progressing, not met  5. Patient goal: to decrease her upper back tightness and pain. Progressing, not met    Plan     Plan of care Certification: 8/7/2024 to 10/2/2024.     Outpatient Physical Therapy 2 times weekly for 8 weeks to include the following interventions: Electrical Stimulation , Manual Therapy, Moist Heat/ Ice, Neuromuscular Re-ed, Patient Education, Self Care, Therapeutic Activities, and Therapeutic Exercise.     Guillermo Bailon, PT, DPT

## 2024-09-19 ENCOUNTER — CLINICAL SUPPORT (OUTPATIENT)
Dept: REHABILITATION | Facility: HOSPITAL | Age: 74
End: 2024-09-19
Payer: MEDICARE

## 2024-09-19 DIAGNOSIS — M54.2 CHRONIC NECK PAIN: Primary | ICD-10-CM

## 2024-09-19 DIAGNOSIS — G89.29 CHRONIC NECK PAIN: Primary | ICD-10-CM

## 2024-09-19 PROCEDURE — 97112 NEUROMUSCULAR REEDUCATION: CPT | Mod: KX

## 2024-09-19 PROCEDURE — 97110 THERAPEUTIC EXERCISES: CPT | Mod: KX

## 2024-09-19 NOTE — PROGRESS NOTES
[facility] OUTPATIENT THERAPY AND WELLNESS   Physical Therapy Discharge Note      Name: Nasima Pacheco  [facility] Number: [id number]    Therapy Diagnosis:   Encounter Diagnosis   Name Primary?    Chronic neck pain Yes     Physician: Suellen Rao MD    Visit Date: 9/19/2024    Physician Orders: PT Eval and Treat  Medical Diagnosis from Referral: M54.16 (ICD-10-CM) - Lumbar radiculopathy  Evaluation Date: 8/7/2024  Authorization Period Expiration: 12/31/2024  Plan of Care Expiration: 10/2/2024  Progress Note Due: 10/2/2024  Visit # / Visits authorized: 5/20 + EVAL   FOTO: 1/3     Precautions: Standard and HTN      Time In: 1301  Time Out: 1359  Total Billable Time: 58 minutes     Family member present as     Subjective     Patient reports: she feels like her neck and upper trap symptoms have improved. However, she does continue with some mid-low back pain that hurts most of the time. Her  notes that [place].    She was somewhat compliant with home exercise program.  Response to previous treatment: no exacerbation of symptoms  Functional change: no significant change at this time    Pain: 0/10  Location: upper back and bilateral upper traps     Objective      Cervical Range of Motion:     Degrees Pain   Flexion 60 no   Extension 50 no   Left Rotation 55 no    Right Rotation 55 no    Left Side Bending 35 no   Right Side Bending 35 Some right-sided symptoms      Shoulder Range of Motion: within functional limits but does have right lateral arm pain with abduction     Upper Extremity Strength    Left Right   Shoulder flexion: 4-/5 4-/5   Shoulder abduction: 4-/5 4-/5*   Shoulder ER 4/5 4/5   Shoulder IR 4+/5 4/5*   Serratus Anterior 3+/5 3+/5      Special Tests:  Impingement Cluster:   Left Right   Hawkin's Ramy - +   Painful Arc - +   Resisted ER - -     Treatment     Ines received the treatments listed below:      therapeutic exercises to develop  "strength, endurance, ROM, and flexibility for 33 minutes including:  Arm bike completed for 4' fwd and 4' back to increase ROM, endurance, and decrease pain to improve tolerance to ADLs and age-related activities  Seated thoracic extension: 20x with 3" holds  Sidelying open books: 20x ea  Assessment above    neuromuscular re-education activities to improve: Coordination, Kinesthetic, Sense, Proprioception, and Posture for 25 minutes. The following activities were included:    ER walkouts arm at side: green theraband, 3x8 with 3" holds right   IR walkouts arm at side: blue theraband, 3x8 with 3" holds right   Serratus wall slide with liftoff: 3x8  Upper trap shrugs: level 3, 3x8 with 3" hold    Not today:  No moneys against wall: green theraband, 3x8 with 3" hold  Supine chin tuck: 10x10"  Sidelying ER: 1#, 3x8 right   Prone chicken wings: 3x8    Patient Education and Home Exercises       Education provided:   - Updated home exercise program    Written Home Exercises Provided: Yes. Exercises were reviewed and Ines was able to demonstrate them prior to the end of the session.  Ines demonstrated good  understanding of the education provided. See Electronic Medical Record under Patient Instructions for exercises provided during therapy sessions    Assessment     Ms. Chacon has been seen for 5 visits since initial evaluation on 8/7/2024. Overall, she has made decent progress with her physical therapy treatments and has worked hard towards all of her therapy goals. She states that she feels like her neck and upper trap symptoms have improved. She does, however, continue to have mid-low back right shoulder pain, which is chronic in nature. Since attending physical therapy, she has reached a little over half of her goals. She was unfortunately not the most compliant with her prescribed home exercise program. She has been discharged with an updated home exercise program to continue independent mobility and strengthening " interventions. She was educated on the importance of performing her home exercises to continue managing her condition and making progress. She was instructed to contact us with any questions or concerns. Patient is agreeable to discharge.    Ines Is progressing well towards her goals.   Patient prognosis is Fair.     Patient will continue to benefit from skilled outpatient physical therapy to address the deficits listed in the problem list box on initial evaluation, provide pt/family education and to maximize pt's level of independence in the home and community environment.     Patient's spiritual, cultural and educational needs considered and pt agreeable to plan of care and goals.     Anticipated barriers to physical therapy: advanced age, chronicity of condition, and language barrier    Goals:   Short Term Goals (4 weeks):  1. Patient will report decreased neck pain at worst to </= 6/10 to increase tolerance for activities of daily living. Met  2. Patient will improve cervical active range of motion by >/= 5 degrees where limited to improve mobility for activities of daily living. Met  3. Patient will improve upper extremity manual muscle tests by 1/3 grade to improve strength for functional tasks. Met  4. Patient will be compliant with home exercise program to supplement therapy in restoring functional mobility. Somewhat met     Long Term Goals (8 weeks):   1. Patient will report decreased neck pain at worst to </= 3/10 to increase tolerance for activities of daily living. Met  2. Patient will improve cervical rotation active range of motion to >/= 65 degree to improve mobility for driving. Not met  3. Patient will improve impaired upper extremity manual muscle tests to >/=4/5 to improve strength for functional tasks. Not met  4. Patient will improve FOTO score to >/= 66% to decrease perceived limitation with functional mobility. Not met  5. Patient goal: to decrease her upper back tightness and pain.  Met    Plan     Plan of care Certification: 8/7/2024 to 10/2/2024.     Discharged with updated home exercise program    Guillermo Bailon, PT, DPT

## 2024-09-23 ENCOUNTER — OFFICE VISIT (OUTPATIENT)
Dept: INTERNAL MEDICINE | Facility: CLINIC | Age: 74
End: 2024-09-23
Payer: MEDICARE

## 2024-09-23 VITALS
DIASTOLIC BLOOD PRESSURE: 62 MMHG | HEART RATE: 83 BPM | HEIGHT: 62 IN | SYSTOLIC BLOOD PRESSURE: 125 MMHG | WEIGHT: 137.13 LBS | BODY MASS INDEX: 25.23 KG/M2

## 2024-09-23 DIAGNOSIS — M79.674 PAIN OF TOE OF RIGHT FOOT: ICD-10-CM

## 2024-09-23 DIAGNOSIS — I10 PRIMARY HYPERTENSION: Primary | Chronic | ICD-10-CM

## 2024-09-23 DIAGNOSIS — R79.9 ABNORMAL FINDING OF BLOOD CHEMISTRY, UNSPECIFIED: ICD-10-CM

## 2024-09-23 DIAGNOSIS — R73.03 PREDIABETES: ICD-10-CM

## 2024-09-23 DIAGNOSIS — M54.16 LUMBAR RADICULOPATHY: ICD-10-CM

## 2024-09-23 PROBLEM — M79.89 SWELLING OF LEFT LOWER EXTREMITY: Status: RESOLVED | Noted: 2023-03-13 | Resolved: 2024-09-23

## 2024-09-23 PROCEDURE — 3288F FALL RISK ASSESSMENT DOCD: CPT | Mod: CPTII,S$GLB,, | Performed by: STUDENT IN AN ORGANIZED HEALTH CARE EDUCATION/TRAINING PROGRAM

## 2024-09-23 PROCEDURE — 1159F MED LIST DOCD IN RCRD: CPT | Mod: CPTII,S$GLB,, | Performed by: STUDENT IN AN ORGANIZED HEALTH CARE EDUCATION/TRAINING PROGRAM

## 2024-09-23 PROCEDURE — 3074F SYST BP LT 130 MM HG: CPT | Mod: CPTII,S$GLB,, | Performed by: STUDENT IN AN ORGANIZED HEALTH CARE EDUCATION/TRAINING PROGRAM

## 2024-09-23 PROCEDURE — 1101F PT FALLS ASSESS-DOCD LE1/YR: CPT | Mod: CPTII,S$GLB,, | Performed by: STUDENT IN AN ORGANIZED HEALTH CARE EDUCATION/TRAINING PROGRAM

## 2024-09-23 PROCEDURE — 3044F HG A1C LEVEL LT 7.0%: CPT | Mod: CPTII,S$GLB,, | Performed by: STUDENT IN AN ORGANIZED HEALTH CARE EDUCATION/TRAINING PROGRAM

## 2024-09-23 PROCEDURE — 3078F DIAST BP <80 MM HG: CPT | Mod: CPTII,S$GLB,, | Performed by: STUDENT IN AN ORGANIZED HEALTH CARE EDUCATION/TRAINING PROGRAM

## 2024-09-23 PROCEDURE — 1126F AMNT PAIN NOTED NONE PRSNT: CPT | Mod: CPTII,S$GLB,, | Performed by: STUDENT IN AN ORGANIZED HEALTH CARE EDUCATION/TRAINING PROGRAM

## 2024-09-23 PROCEDURE — 1160F RVW MEDS BY RX/DR IN RCRD: CPT | Mod: CPTII,S$GLB,, | Performed by: STUDENT IN AN ORGANIZED HEALTH CARE EDUCATION/TRAINING PROGRAM

## 2024-09-23 PROCEDURE — 99999 PR PBB SHADOW E&M-EST. PATIENT-LVL III: CPT | Mod: PBBFAC,,, | Performed by: STUDENT IN AN ORGANIZED HEALTH CARE EDUCATION/TRAINING PROGRAM

## 2024-09-23 PROCEDURE — 99213 OFFICE O/P EST LOW 20 MIN: CPT | Mod: S$GLB,,, | Performed by: STUDENT IN AN ORGANIZED HEALTH CARE EDUCATION/TRAINING PROGRAM

## 2024-09-23 RX ORDER — MELOXICAM 7.5 MG/1
7.5 TABLET ORAL DAILY PRN
Qty: 30 TABLET | Refills: 2 | Status: SHIPPED | OUTPATIENT
Start: 2024-09-23

## 2024-09-23 RX ORDER — DICLOFENAC SODIUM 10 MG/G
2 GEL TOPICAL DAILY
Qty: 100 G | Refills: 3 | Status: SHIPPED | OUTPATIENT
Start: 2024-09-23 | End: 2024-12-22

## 2024-09-23 NOTE — PROGRESS NOTES
Subjective:       Patient ID: Nasima Pacheco is a 74 y.o. female.    Chief Complaint: Hypertension (HTN & DM)      Nasima Pacheco is a 74 y.o. female , Greek speaking, with a history of:  HTN  HLD  BPPV  OA  Back pain  Lumbar radiculopathy      [Local Patient]  Originally from [place]  Lives in: [place] [address]       Patient comes to the clinic for a hypertension follow up.    Patient has been checking her blood pressure at home several times per week.  BP average 125-135/60-70.  Patient denies cardiovascular symptoms such as CP, palpitations, SOB, leg swelling.    She continues to experience mild to moderate back pain radiated to the left thigh. It improves with standing and exercise.    She has finished her physical therapy sessions.    No other complaints.    Latest PCP visits:      07/01/24 Lumbar radiculopathy  3/21/24 AWV  1/22/24 Hospital discharge f/u vertigo  10/02/23 HTN - increased dose of Micardis to 80-25  09/05/23 HTN - dc amlodipine, started patient on Micardis at low dose. .    Changes in health or medications: No    Specialists visits and recommendations:   10/30/23 Cardiology: added amlodipine 5 mg qd      H/o ER or Urgent care visits:   NO    H/o Hospitalizations:  NO    H/o falls: None     Life events / lifestyle:   Nothing new      Most recent / available laboratories reviewed with the patient:    Recent Labs   Lab 03/13/23  1013 01/08/24  0341 03/21/24  1049   WBC 4.85 6.08 5.88   Hemoglobin 13.3 12.6 13.8   Hematocrit 40.7 34.6 L 40.5   MCV 92 83 90   Platelets 208 202 242       Recent Labs   Lab 09/29/21  1017 03/29/22  0957 09/13/22  1426 03/13/23  1013 01/08/24  0341 03/21/24  1049   Glucose 101 92   < > 105 133 H 100   Sodium 142 143   < > 143 140 141   Potassium 3.8 4.1   < > 4.1 3.7 4.2   BUN 13 14   < > 11 13 10   Creatinine 0.6 0.7   < > 0.6 0.6 0.6   eGFR if non African American >60.0 >60.0  --   --   --   --    Total Bilirubin 1.0 1.1 H  --  0.7 0.6 1.0   AST 18 17  --   "15 13 16   ALT 15 12  --  15 14 16    < > = values in this interval not displayed.       Recent Labs   Lab 09/13/22  1426 03/13/23  1013 03/21/24  1049   Hemoglobin A1C 5.7 H 5.9 H 5.9 H       Recent Labs   Lab 03/29/22  0957 03/13/23  1013 03/21/24  1049   Cholesterol 225 H 171 206 H   Triglycerides 121 64 108   HDL 57 52 44   LDL Cholesterol 143.8 106.2 140.4   Non-HDL Cholesterol 168 119 162       Recent Labs   Lab 09/29/21  1017 03/13/23  1013   TSH 2.283 2.131               Current medications:    Current Outpatient Medications:     amLODIPine (NORVASC) 5 MG tablet, Take 1 tablet (5 mg total) by mouth once daily., Disp: 90 tablet, Rfl: 3    atorvastatin (LIPITOR) 10 MG tablet, Take 1 tablet (10 mg total) by mouth once daily., Disp: 90 tablet, Rfl: 3    calcium-vitamin D3 (OS-MOLLY 500 + D3) 500 mg-5 mcg (200 unit) per tablet, Take 1 tablet by mouth 2 (two) times daily with meals., Disp: 180 tablet, Rfl: 3    gabapentin (NEURONTIN) 100 MG capsule, Take 1 capsule (100 mg total) by mouth every evening., Disp: 90 capsule, Rfl: 3    meclizine (ANTIVERT) 50 MG tablet, Take 12.5 mg by mouth 3 (three) times daily as needed. AS NEEDED, Disp: , Rfl:     meloxicam (MOBIC) 7.5 MG tablet, TAKE 1 TABLET BY MOUTH DAILY AS NEEDED FOR PAIN, Disp: 30 tablet, Rfl: 2    telmisartan-hydrochlorothiazide (MICARDIS HCT) 80-25 mg per tablet, Take 1 tablet by mouth once daily., Disp: 90 tablet, Rfl: 3    diclofenac sodium (VOLTAREN) 1 % Gel, Apply 2 g topically once daily., Disp: 1 each, Rfl: 3      Healthcare Maintenance:  Colonoscopy: FOBT   Vaccinations: Pneumonia 2016, Zoster (no), Tetanus 2023  COVID vaccination: completed x 2  Depression screening: PHQ2 score = 0     Annual Wellness visit approx. Month: March ROS  11-point review of systems done. Negative except for detailed in the HPI.        Objective:      /62   Pulse 83   Ht 5' 2" (1.575 m)   Wt 62.2 kg (137 lb 2 oz)   BMI 25.08 kg/m²      Physical Exam   General " appearance: Good general aspect, NAD, conversant   Eyes and HEENT: Normal sclerae, moist mucous membranes, no thyromegaly or lymphadenopathy  Respiratory: No work of breathing, clear to auscultation bilaterally. No rales, rhonchi, wheezing, or rubs.  Cardiovascular: PMI not displaced. RRR. Normal S1, S2. No murmurs, rubs or gallops.  Abdomen and : Soft, non-tender, nondistended, BS, no hepatosplenomegaly, no masses.  Extremities: no edemas, no extremity lymphadenopathy, no clubbing, no cyanosis.  Nervous System: Alert and oriented x 3, good concentration, no deficits.  Skin: Normal temperature, turgor and texture; no rash, ulcers or subcutaneous nodules  Psych: Appropriate affect, alert and oriented to person, place and time          Assessment:       1. Primary hypertension  Assessment & Plan:  Controlled.  Will continue same management:  Valsartan 80 + HCTZ 25  Amlodipine 5 mg qd      Recommended to monitor blood pressure regularly, eat a well-balanced diet that's low in salt, DASH diet, enjoy regular physical activity, manage stress, maintain a healthy weight, take medications properly.      Orders:  -     CBC Auto Differential; Future; Expected date: 03/23/2025    2. Prediabetes  -     Comprehensive Metabolic Panel; Future; Expected date: 03/23/2025  -     Hemoglobin A1C; Future; Expected date: 03/23/2025  -     Lipid Panel; Future; Expected date: 03/23/2025  -     Urinalysis, Reflex to Urine Culture Urine, Clean Catch; Future; Expected date: 03/23/2025    3. Abnormal finding of blood chemistry, unspecified  -     Lipid Panel; Future; Expected date: 03/23/2025    4. Pain of toe of right foot  -     diclofenac sodium (VOLTAREN) 1 % Gel; Apply 2 g topically once daily.  Dispense: 1 each; Refill: 3         Summary of orders / plan:       Continue same management      Problem list updated  Medications reconciled  Education provided  Lifestyle recommendations given  AVS generated, explained, and sent to the  patient.  RTC in :March for AWV,labs ordered            OLGA PHILLIPS MD, MPH  Internal Medicine  [facility]

## 2024-10-07 ENCOUNTER — TELEPHONE (OUTPATIENT)
Dept: PODIATRY | Facility: CLINIC | Age: 74
End: 2024-10-07
Payer: MEDICARE

## 2024-10-07 NOTE — TELEPHONE ENCOUNTER
----- Message from Joya sent at 10/7/2024 10:29 AM CDT -----  Regarding: Call back  Contact: [phone]  Type:  Sooner Apoointment Request    Caller is requesting a sooner appointment.  Caller declined first available appointment listed below.  Caller will not accept being placed on the waitlist and is requesting a message be sent to doctor.  Name of Caller: New PT   When is the first available appointment?   Symptoms: Left foot pain and heel   Would the patient rather a call back or a response via [facility]? Call back   Best Call Back Number: [phone]   Additional Information: Please call back in Lao

## 2024-10-07 NOTE — TELEPHONE ENCOUNTER
Called pt back with  help, 2nd attempt to reach out to her, left message to ask her to call back and let me know when is a good time to reach her

## 2024-10-07 NOTE — TELEPHONE ENCOUNTER
Called pt back with the help from  service, no answer, unable to leave a message. Have to try calling back later

## 2024-10-09 ENCOUNTER — OFFICE VISIT (OUTPATIENT)
Dept: PODIATRY | Facility: CLINIC | Age: 74
End: 2024-10-09
Payer: MEDICARE

## 2024-10-09 VITALS
HEIGHT: 62 IN | SYSTOLIC BLOOD PRESSURE: 109 MMHG | HEART RATE: 72 BPM | BODY MASS INDEX: 24.75 KG/M2 | WEIGHT: 134.5 LBS | DIASTOLIC BLOOD PRESSURE: 71 MMHG

## 2024-10-09 DIAGNOSIS — B35.1 ONYCHOMYCOSIS DUE TO DERMATOPHYTE: ICD-10-CM

## 2024-10-09 DIAGNOSIS — M79.671 FOOT PAIN, RIGHT: Primary | ICD-10-CM

## 2024-10-09 DIAGNOSIS — L84 CORN OR CALLUS: ICD-10-CM

## 2024-10-09 PROCEDURE — 1125F AMNT PAIN NOTED PAIN PRSNT: CPT | Mod: CPTII,S$GLB,, | Performed by: PODIATRIST

## 2024-10-09 PROCEDURE — 99999 PR PBB SHADOW E&M-EST. PATIENT-LVL III: CPT | Mod: PBBFAC,,, | Performed by: PODIATRIST

## 2024-10-09 PROCEDURE — 99204 OFFICE O/P NEW MOD 45 MIN: CPT | Mod: S$GLB,,, | Performed by: PODIATRIST

## 2024-10-09 PROCEDURE — 3074F SYST BP LT 130 MM HG: CPT | Mod: CPTII,S$GLB,, | Performed by: PODIATRIST

## 2024-10-09 PROCEDURE — 1160F RVW MEDS BY RX/DR IN RCRD: CPT | Mod: CPTII,S$GLB,, | Performed by: PODIATRIST

## 2024-10-09 PROCEDURE — 1159F MED LIST DOCD IN RCRD: CPT | Mod: CPTII,S$GLB,, | Performed by: PODIATRIST

## 2024-10-09 PROCEDURE — 3288F FALL RISK ASSESSMENT DOCD: CPT | Mod: CPTII,S$GLB,, | Performed by: PODIATRIST

## 2024-10-09 PROCEDURE — 3044F HG A1C LEVEL LT 7.0%: CPT | Mod: CPTII,S$GLB,, | Performed by: PODIATRIST

## 2024-10-09 PROCEDURE — 3078F DIAST BP <80 MM HG: CPT | Mod: CPTII,S$GLB,, | Performed by: PODIATRIST

## 2024-10-09 PROCEDURE — 1101F PT FALLS ASSESS-DOCD LE1/YR: CPT | Mod: CPTII,S$GLB,, | Performed by: PODIATRIST

## 2024-10-09 RX ORDER — AMMONIUM LACTATE 12 G/100G
1 CREAM TOPICAL DAILY
Qty: 140 G | Refills: 1 | Status: SHIPPED | OUTPATIENT
Start: 2024-10-09

## 2024-10-09 RX ORDER — CICLOPIROX 80 MG/ML
SOLUTION TOPICAL NIGHTLY
Qty: 6.6 ML | Refills: 1 | Status: SHIPPED | OUTPATIENT
Start: 2024-10-09

## 2024-10-09 NOTE — PROGRESS NOTES
Subjective:   Pt declines interpretor ; relates it is not needed     Patient ID: Nasima Pacheco is a 74 y.o. female.    Chief Complaint:   Foot Pain (Right throbbing pain midfoot ) and Foot Problem (Burning in the great toes Bilateral radiating to the arch )    Nasima is a 74 y.o. female who presents to the podiatry clinic  with complaint of  right foot pain.    Presents with   Started about one week ago. No injury, no change in shoegear.  Pain is gone today... contributes this to the volteran gel      9/23/24 Pt saw pcp: rx volteran gel     Also peeling dry skin.  No professional treatment for thick nails and skin      Hemoglobin A1C   Date Value Ref Range Status   03/21/2024 5.9 (H) 4.0 - 5.6 % Final     Comment:     ADA Screening Guidelines:  5.7-6.4%  Consistent with prediabetes  >or=6.5%  Consistent with diabetes    High levels of fetal hemoglobin interfere with the HbA1C  assay. Heterozygous hemoglobin variants (HbS, HgC, etc)do  not significantly interfere with this assay.   However, presence of multiple variants may affect accuracy.     03/13/2023 5.9 (H) 4.0 - 5.6 % Final     Comment:     ADA Screening Guidelines:  5.7-6.4%  Consistent with prediabetes  >or=6.5%  Consistent with diabetes    High levels of fetal hemoglobin interfere with the HbA1C  assay. Heterozygous hemoglobin variants (HbS, HgC, etc)do  not significantly interfere with this assay.   However, presence of multiple variants may affect accuracy.     09/13/2022 5.7 (H) 4.0 - 5.6 % Final     Comment:     ADA Screening Guidelines:  5.7-6.4%  Consistent with prediabetes  >or=6.5%  Consistent with diabetes    High levels of fetal hemoglobin interfere with the HbA1C  assay. Heterozygous hemoglobin variants (HbS, HgC, etc)do  not significantly interfere with this assay.   However, presence of multiple variants may affect accuracy.          Past Medical History:   Diagnosis Date    Arthritis     Cataract     Hyperlipidemia      Hypertension     Mild major depression, single episode 2023     Past Surgical History:   Procedure Laterality Date    CATARACT EXTRACTION W/  INTRAOCULAR LENS IMPLANT Right 2017    Dr. Mendez    CATARACT EXTRACTION W/  INTRAOCULAR LENS IMPLANT Left 2017    Dr. Mendez     SECTION      EYE SURGERY      HYSTERECTOMY      OOPHORECTOMY       Current Outpatient Medications on File Prior to Visit   Medication Sig Dispense Refill    amLODIPine (NORVASC) 5 MG tablet Take 1 tablet (5 mg total) by mouth once daily. 90 tablet 3    atorvastatin (LIPITOR) 10 MG tablet Take 1 tablet (10 mg total) by mouth once daily. 90 tablet 3    calcium-vitamin D3 (OS-MOLLY 500 + D3) 500 mg-5 mcg (200 unit) per tablet Take 1 tablet by mouth 2 (two) times daily with meals. 180 tablet 3    diclofenac sodium (VOLTAREN) 1 % Gel Apply 2 g topically once daily. 100 g 3    gabapentin (NEURONTIN) 100 MG capsule Take 1 capsule (100 mg total) by mouth every evening. 90 capsule 3    meclizine (ANTIVERT) 50 MG tablet Take 12.5 mg by mouth 3 (three) times daily as needed. AS NEEDED      meloxicam (MOBIC) 7.5 MG tablet TAKE 1 TABLET BY MOUTH DAILY AS NEEDED FOR PAIN 30 tablet 2    telmisartan-hydrochlorothiazide (MICARDIS HCT) 80-25 mg per tablet Take 1 tablet by mouth once daily. 90 tablet 3     No current facility-administered medications on file prior to visit.     Review of patient's allergies indicates:  No Known Allergies    Review of Systems   Constitutional: Negative for chills, decreased appetite, fever, malaise/fatigue, night sweats, weight gain and weight loss.   Cardiovascular:  Negative for chest pain, claudication, dyspnea on exertion, leg swelling, palpitations and syncope.   Respiratory:  Negative for cough and shortness of breath.    Endocrine: Negative for cold intolerance and heat intolerance.   Hematologic/Lymphatic: Negative for bleeding problem. Does not bruise/bleed easily.   Skin:  Positive for dry skin and nail  "changes. Negative for color change, flushing, itching, poor wound healing, rash, skin cancer, suspicious lesions and unusual hair distribution.   Musculoskeletal:  Negative for arthritis, back pain, falls, gout, joint pain, joint swelling, muscle cramps, muscle weakness, myalgias, neck pain and stiffness.        Foot pain resolved.    Gastrointestinal:  Negative for diarrhea, nausea and vomiting.   Neurological:  Negative for dizziness, focal weakness, light-headedness, numbness, paresthesias, tremors, vertigo and weakness.   Psychiatric/Behavioral:  Negative for altered mental status and depression. The patient does not have insomnia.    Allergic/Immunologic: Negative.            Objective:       Vitals:    10/09/24 0820   BP: 109/71   Pulse: 72   Weight: 61 kg (134 lb 7.7 oz)   Height: 5' 2" (1.575 m)   PainSc:   9   PainLoc: Foot   61 kg (134 lb 7.7 oz)     Physical Exam  Vitals reviewed.   Constitutional:       General: She is not in acute distress.     Appearance: She is well-developed. She is not ill-appearing, toxic-appearing or diaphoretic.      Comments: Proper supportive shoegear      Cardiovascular:      Pulses:           Dorsalis pedis pulses are 2+ on the right side and 2+ on the left side.        Posterior tibial pulses are 2+ on the right side and 2+ on the left side.   Musculoskeletal:         General: No tenderness.      Right lower leg: No edema.      Left lower leg: No edema.      Right ankle: Normal.      Right Achilles Tendon: Normal.      Left ankle: Normal.      Left Achilles Tendon: Normal.      Right foot: Decreased range of motion. Deformity, bunion and prominent metatarsal heads present. No tenderness or bony tenderness.      Left foot: Decreased range of motion. Deformity, bunion and prominent metatarsal heads present. No tenderness or bony tenderness.      Comments: No pop or with rom     Strength 5/5 b/l   Feet:      Right foot:      Protective Sensation: 10 sites tested.  10 sites " "sensed.      Skin integrity: Callus (subMTs) and dry skin present. No ulcer, blister, skin breakdown, erythema or warmth.      Toenail Condition: Fungal disease present.     Left foot:      Protective Sensation: 10 sites tested.  10 sites sensed.      Skin integrity: Callus and dry skin present. No ulcer, blister, skin breakdown, erythema or warmth.      Toenail Condition: Fungal disease present.     Comments: Scaling dryness     Toenails 1-5 bilaterally are elongated by 4-5 mm, thickened by 6-7 mm, discolored/yellowed, dystrophic, brittle with subungual debris.        Skin:     General: Skin is warm.      Capillary Refill: Capillary refill takes 2 to 3 seconds.      Coloration: Skin is not pale.      Findings: No erythema or rash.   Neurological:      Mental Status: She is alert and oriented to person, place, and time.      Sensory: No sensory deficit.      Gait: Gait is intact.   Psychiatric:         Attention and Perception: Attention normal.         Mood and Affect: Mood normal.         Speech: Speech normal.         Behavior: Behavior normal.         Thought Content: Thought content normal.         Cognition and Memory: Cognition normal.         Judgment: Judgment normal.               Assessment:       Encounter Diagnoses   Name Primary?    Foot pain, right Yes    Onychomycosis due to dermatophyte     Corn or callus          Plan:       Nasima Clinton" was seen today for foot pain and foot problem.    Diagnoses and all orders for this visit:    Foot pain, right    Onychomycosis due to dermatophyte    Corn or callus    Other orders  -     ciclopirox (PENLAC) 8 % Soln; Apply topically nightly. Remove once a week with nail polish remover  -     ammonium lactate 12 % Crea; Apply 1 g topically Daily.      I counseled the patient on her conditions, their implications and medical management.    - foot pain has resolved with voltaren    No indication for xray /recommend proper shoes    - Shoe inspection. Diabetic Foot " Education. Patient reminded of the importance of good nutrition and blood sugar control to help prevent podiatric complications of diabetes. Patient instructed on proper foot hygeine. We discussed wearing proper shoe gear, daily foot inspections, never walking without protective shoe gear, never putting sharp instruments to feet, routine podiatric nail visits every 2-3 months.      - With patient's permission, the toenails mentioned above were aggressively reduced and debrided using a nail nipper, removing all offending nail and debris. Utilizing a #15 scalpel, I trimmed the corns and calluses at the above mentioned location.      The patient will continue to monitor the areas daily, inspect the feet, wear protective shoe gear when ambulatory, and moisturizer to maintain skin integrity.     - rx amm lac     - Discuss treatment options for nail fungus.  I explained that fungus lives in a warm dark moist environment and therefore patient should make every attempt to keep feet clean and dry.  We discussed drying feet thoroughly after shower particularly between the toes and then applying powder between the toes and in the shoes.   For fungal toenails I prescribed Penlac to be used daily for up to a year.  We discussed oral Lamisil but I did not recommend it as a first line of treatment since it is an internal medicine that may potentially have side effects, including liver problems. Patient elects for topical treatment. Patient instructed on proper use of Penlac.           Follow up in about 2 months (around 12/9/2024).      31-Dec-2023 14:08

## 2024-10-15 ENCOUNTER — OFFICE VISIT (OUTPATIENT)
Dept: CARDIOLOGY | Facility: CLINIC | Age: 74
End: 2024-10-15
Payer: MEDICARE

## 2024-10-15 VITALS
DIASTOLIC BLOOD PRESSURE: 63 MMHG | HEIGHT: 62 IN | HEART RATE: 79 BPM | SYSTOLIC BLOOD PRESSURE: 121 MMHG | BODY MASS INDEX: 25.55 KG/M2 | OXYGEN SATURATION: 99 % | WEIGHT: 138.88 LBS

## 2024-10-15 DIAGNOSIS — I10 PRIMARY HYPERTENSION: ICD-10-CM

## 2024-10-15 DIAGNOSIS — E78.2 MIXED HYPERLIPIDEMIA: Primary | ICD-10-CM

## 2024-10-15 DIAGNOSIS — I70.0 ATHEROSCLEROSIS OF AORTA: ICD-10-CM

## 2024-10-15 PROCEDURE — 1125F AMNT PAIN NOTED PAIN PRSNT: CPT | Mod: CPTII,S$GLB,, | Performed by: STUDENT IN AN ORGANIZED HEALTH CARE EDUCATION/TRAINING PROGRAM

## 2024-10-15 PROCEDURE — 99214 OFFICE O/P EST MOD 30 MIN: CPT | Mod: S$GLB,,, | Performed by: STUDENT IN AN ORGANIZED HEALTH CARE EDUCATION/TRAINING PROGRAM

## 2024-10-15 PROCEDURE — 1101F PT FALLS ASSESS-DOCD LE1/YR: CPT | Mod: CPTII,S$GLB,, | Performed by: STUDENT IN AN ORGANIZED HEALTH CARE EDUCATION/TRAINING PROGRAM

## 2024-10-15 PROCEDURE — 1160F RVW MEDS BY RX/DR IN RCRD: CPT | Mod: CPTII,S$GLB,, | Performed by: STUDENT IN AN ORGANIZED HEALTH CARE EDUCATION/TRAINING PROGRAM

## 2024-10-15 PROCEDURE — 1159F MED LIST DOCD IN RCRD: CPT | Mod: CPTII,S$GLB,, | Performed by: STUDENT IN AN ORGANIZED HEALTH CARE EDUCATION/TRAINING PROGRAM

## 2024-10-15 PROCEDURE — 99999 PR PBB SHADOW E&M-EST. PATIENT-LVL III: CPT | Mod: PBBFAC,,, | Performed by: STUDENT IN AN ORGANIZED HEALTH CARE EDUCATION/TRAINING PROGRAM

## 2024-10-15 PROCEDURE — 3074F SYST BP LT 130 MM HG: CPT | Mod: CPTII,S$GLB,, | Performed by: STUDENT IN AN ORGANIZED HEALTH CARE EDUCATION/TRAINING PROGRAM

## 2024-10-15 PROCEDURE — 3044F HG A1C LEVEL LT 7.0%: CPT | Mod: CPTII,S$GLB,, | Performed by: STUDENT IN AN ORGANIZED HEALTH CARE EDUCATION/TRAINING PROGRAM

## 2024-10-15 PROCEDURE — 3078F DIAST BP <80 MM HG: CPT | Mod: CPTII,S$GLB,, | Performed by: STUDENT IN AN ORGANIZED HEALTH CARE EDUCATION/TRAINING PROGRAM

## 2024-10-15 PROCEDURE — 3288F FALL RISK ASSESSMENT DOCD: CPT | Mod: CPTII,S$GLB,, | Performed by: STUDENT IN AN ORGANIZED HEALTH CARE EDUCATION/TRAINING PROGRAM

## 2024-10-15 NOTE — PROGRESS NOTES
[facility]    LV visit seen by Dr. Brown   Patient ID: Nasima Pacheco is a pleasant 74 y.o. female with HLD, HTN, who presents to establish care. Pt voiced no CV complaints. She is compliant with her medications without side effects. She follows once a year for HTN co-management which is excellent managed. Denies cp, sob, palpitations, presyncope/dizziness, syncope, orthopnea, PND, bendopnea, decrease ET, NVDC, fever, chills.      HP    Past Medical History:   Diagnosis Date    Arthritis     Cataract     Hyperlipidemia     Hypertension     Mild major depression, single episode 2023     Past Surgical History:   Procedure Laterality Date    CATARACT EXTRACTION W/  INTRAOCULAR LENS IMPLANT Right 2017    Dr. Mednez    CATARACT EXTRACTION W/  INTRAOCULAR LENS IMPLANT Left 2017    Dr. Mendez     SECTION      EYE SURGERY      HYSTERECTOMY      OOPHORECTOMY       Social History     Socioeconomic History    Marital status:    Tobacco Use    Smoking status: Never    Smokeless tobacco: Never   Substance and Sexual Activity    Alcohol use: No    Drug use: Never    Sexual activity: Not Currently     Partners: Male     Social Drivers of Health     Financial Resource Strain: Low Risk  (10/8/2024)    Overall Financial Resource Strain (CARDIA)     Difficulty of Paying Living Expenses: Not hard at all   Food Insecurity: No Food Insecurity (10/8/2024)    Hunger Vital Sign     Worried About Running Out of Food in the Last Year: Never true     Ran Out of Food in the Last Year: Never true   Transportation Needs: No Transportation Needs (2022)    PRAPARE - Transportation     Lack of Transportation (Medical): No     Lack of Transportation (Non-Medical): No   Physical Activity: Insufficiently Active (10/8/2024)    Exercise Vital Sign     Days of Exercise per Week: 1 day     Minutes of Exercise per Session: 20 min   Stress: No Stress Concern Present (10/8/2024)    [employer] [place] [employer] - Occupational Stress Questionnaire     Feeling of Stress : Not at all   Housing Stability: Unknown (5/24/2022)    Housing Stability Vital Sign     Unable to Pay for Housing in the Last Year: No     Unstable Housing in the Last Year: No     Family History   Problem Relation Name Age of Onset    Heart attack Mother      Amblyopia Neg Hx      Blindness Neg Hx      Cataracts Neg Hx      Glaucoma Neg Hx      Macular degeneration Neg Hx      Retinal detachment Neg Hx      Strabismus Neg Hx         Review of patient's allergies indicates:  No Known Allergies    Medication List with Changes/Refills   Current Medications    AMLODIPINE (NORVASC) 5 MG TABLET    Take 1 tablet (5 mg total) by mouth once daily.    AMMONIUM LACTATE 12 % CREA    Apply 1 g topically Daily.    ATORVASTATIN (LIPITOR) 10 MG TABLET    Take 1 tablet (10 mg total) by mouth once daily.    CALCIUM-VITAMIN D3 (OS-MOLLY 500 + D3) 500 MG-5 MCG (200 UNIT) PER TABLET    Take 1 tablet by mouth 2 (two) times daily with meals.    CICLOPIROX (PENLAC) 8 % SOLN    Apply topically nightly. Remove once a week with nail polish remover    DICLOFENAC SODIUM (VOLTAREN) 1 % GEL    Apply 2 g topically once daily.    GABAPENTIN (NEURONTIN) 100 MG CAPSULE    Take 1 capsule (100 mg total) by mouth every evening.    MECLIZINE (ANTIVERT) 50 MG TABLET    Take 12.5 mg by mouth 3 (three) times daily as needed. AS NEEDED    MELOXICAM (MOBIC) 7.5 MG TABLET    TAKE 1 TABLET BY MOUTH DAILY AS NEEDED FOR PAIN    TELMISARTAN-HYDROCHLOROTHIAZIDE (MICARDIS HCT) 80-25 MG PER TABLET    Take 1 tablet by mouth once daily.       Review of Systems  Constitution: Denies chills, fever, and sweats.  HENT: Denies headaches or blurry vision.  Cardiovascular: Denies chest pain or irregular heart beat.  Respiratory: Denies cough or shortness of breath.  Gastrointestinal: Denies abdominal pain, nausea, or vomiting.  Musculoskeletal: Denies muscle cramps.  Neurological: Denies dizziness  "or focal weakness.  Psychiatric/Behavioral: Normal mental status.  Hematologic/Lymphatic: Denies bleeding problem or easy bruising/bleeding.  Skin: Denies rash or suspicious lesions    Physical Examination  /63   Pulse 79   Ht 5' 2" (1.575 m)   Wt 63 kg (138 lb 14.2 oz)   SpO2 99%   BMI 25.40 kg/m²     Constitutional: No acute distress, conversant  HEENT: Sclera anicteric, Pupils equal, round and reactive to light, extraocular motions intact, Oropharynx clear  Neck: No JVD, no carotid bruits  Cardiovascular: regular rate and rhythm, no murmur, rubs or gallops, normal S1/S2  Pulmonary: Clear to auscultation bilaterally  Abdominal: Abdomen soft, nontender, nondistended, positive bowel sounds  Extremities: No lower extremity edema,   Pulses:  Carotid pulses are 2+ on the right side, and 2+ on the left side.  Radial pulses are 2+ on the right side, and 2+ on the left side.   Femoral pulses are 2+ on the right side, and 2+ on the left side.  Popliteal pulses are 2+ on the right side, and 2+ on the left side.   Dorsalis pedis pulses are 2+ on the right side, and 2+ on the left side.   Posterior tibial pulses are 2+ on the right side, and 2+ on the left side.    Skin: No ecchymosis, erythema, or ulcers  Psych: Alert and oriented x 3, appropriate affect  Neuro: CNII-XII intact, no focal deficits    Labs:  Most Recent Data  CBC:   Lab Results   Component Value Date    WBC 5.88 03/21/2024    HGB 13.8 03/21/2024    HCT 40.5 03/21/2024     03/21/2024    MCV 90 03/21/2024    RDW 13.2 03/21/2024     BMP:   Lab Results   Component Value Date     03/21/2024    K 4.2 03/21/2024     03/21/2024    CO2 27 03/21/2024    BUN 10 03/21/2024    CREATININE 0.6 03/21/2024     03/21/2024    CALCIUM 9.8 03/21/2024    MG 1.8 01/08/2024    PHOS 3.5 05/08/2017     LFTS;   Lab Results   Component Value Date    PROT 6.8 03/21/2024    ALBUMIN 4.3 03/21/2024    BILITOT 1.0 03/21/2024    AST 16 03/21/2024    ALKPHOS " "125 03/21/2024    ALT 16 03/21/2024     COAGS: No results found for: "INR", "PROTIME", "PTT"  FLP:   Lab Results   Component Value Date    CHOL 206 (H) 03/21/2024    HDL 44 03/21/2024    LDLCALC 140.4 03/21/2024    TRIG 108 03/21/2024    CHOLHDL 21.4 03/21/2024     CARDIAC:   Lab Results   Component Value Date    TROPONINI <0.006 12/09/2008       Imaging:    EKG 3/13/2023:  Normal sinus rhythm   Abnormal R wave progression in the precordial leads   Cannot rule out Anterior infarct (age undetermined)    Assessment/Plan:  Nasima Pacheco is a 89y.o. female with HLD, HTN, who presents for initial appointment.     HTN- Continue telmisartan/HCTZ 80-25 mg daily and amlodipine 5 mg daily. Will be cautious with HCTZ and electrolytes. Check Labs and echo     2. HLD- Pt instructed to take atorvastatin 10 mg daily as prescribed.    3. Overweight- Encourage diet, exercise, and weight loss. Pt to walk at least 30 minutes a day/5 days a week.      Follow up in 12 months    Total duration of face to face visit time 34  Total time spent counseling greater than fifty percent of total visit time.  Counseling included discussion regarding imaging findings, diagnosis, possibilities, treatment options, risks and benefits.  The patient had many questions regarding the options and long-term effects.    Rafael Casey MD        "

## 2024-11-01 ENCOUNTER — HOSPITAL ENCOUNTER (OUTPATIENT)
Dept: CARDIOLOGY | Facility: HOSPITAL | Age: 74
Discharge: HOME | End: 2024-11-01
Attending: STUDENT IN AN ORGANIZED HEALTH CARE EDUCATION/TRAINING PROGRAM
Payer: MEDICARE

## 2024-11-01 VITALS — HEIGHT: 62 IN | BODY MASS INDEX: 25.4 KG/M2 | WEIGHT: 138 LBS

## 2024-11-01 DIAGNOSIS — E78.2 MIXED HYPERLIPIDEMIA: ICD-10-CM

## 2024-11-01 DIAGNOSIS — I70.0 ATHEROSCLEROSIS OF AORTA: ICD-10-CM

## 2024-11-01 DIAGNOSIS — I10 PRIMARY HYPERTENSION: ICD-10-CM

## 2024-11-01 LAB
APICAL FOUR CHAMBER EJECTION FRACTION: 60 %
APICAL TWO CHAMBER EJECTION FRACTION: 69 %
ASCENDING AORTA: 3.4 CM
AV INDEX (PROSTH): 0.54
AV MEAN GRADIENT: 6.9 MMHG
AV PEAK GRADIENT: 11.6 MMHG
AV REGURGITATION PRESSURE HALF TIME: 629.73 MS
AV VALVE AREA BY VELOCITY RATIO: 2 CM²
AV VALVE AREA: 2 CM²
AV VELOCITY RATIO: 0.53
BSA FOR ECHO PROCEDURE: 1.65 M2
CV ECHO LV RWT: 0.37 CM
DOP CALC AO PEAK VEL: 1.7 M/S
DOP CALC AO VTI: 32.8 CM
DOP CALC LVOT AREA: 3.8 CM2
DOP CALC LVOT DIAMETER: 2.2 CM
DOP CALC LVOT PEAK VEL: 0.9 M/S
DOP CALC LVOT STROKE VOLUME: 66.9 CM3
DOP CALC MV VTI: 24.7 CM
DOP CALCLVOT PEAK VEL VTI: 17.6 CM
E WAVE DECELERATION TIME: 250.48 MSEC
E/A RATIO: 0.88
E/E' RATIO: 9.89 M/S
ECHO LV POSTERIOR WALL: 0.7 CM (ref 0.6–1.1)
FRACTIONAL SHORTENING: 39.5 % (ref 28–44)
INTERVENTRICULAR SEPTUM: 0.7 CM (ref 0.6–1.1)
LEFT ATRIUM AREA SYSTOLIC (APICAL 2 CHAMBER): 15 CM2
LEFT ATRIUM AREA SYSTOLIC (APICAL 4 CHAMBER): 14.97 CM2
LEFT ATRIUM VOLUME INDEX MOD: 22.6 ML/M2
LEFT ATRIUM VOLUME MOD: 36.83 ML
LEFT INTERNAL DIMENSION IN SYSTOLE: 2.3 CM (ref 2.1–4)
LEFT VENTRICLE DIASTOLIC VOLUME INDEX: 36.96 ML/M2
LEFT VENTRICLE DIASTOLIC VOLUME: 60.24 ML
LEFT VENTRICLE END DIASTOLIC VOLUME APICAL 2 CHAMBER: 52.19 ML
LEFT VENTRICLE END DIASTOLIC VOLUME APICAL 4 CHAMBER: 47.94 ML
LEFT VENTRICLE END SYSTOLIC VOLUME APICAL 2 CHAMBER: 35.25 ML
LEFT VENTRICLE END SYSTOLIC VOLUME APICAL 4 CHAMBER: 37.16 ML
LEFT VENTRICLE MASS INDEX: 44.1 G/M2
LEFT VENTRICLE SYSTOLIC VOLUME INDEX: 11.3 ML/M2
LEFT VENTRICLE SYSTOLIC VOLUME: 18.44 ML
LEFT VENTRICULAR INTERNAL DIMENSION IN DIASTOLE: 3.8 CM (ref 3.5–6)
LEFT VENTRICULAR MASS: 71.9 G
LV LATERAL E/E' RATIO: 8.55 M/S
LV SEPTAL E/E' RATIO: 11.75 M/S
LVED V (TEICH): 60.24 ML
LVES V (TEICH): 18.44 ML
LVOT MG: 2 MMHG
LVOT MV: 0.68 CM/S
MV MEAN GRADIENT: 2 MMHG
MV PEAK A VEL: 1.07 M/S
MV PEAK E VEL: 0.94 M/S
MV PEAK GRADIENT: 4 MMHG
MV STENOSIS PRESSURE HALF TIME: 72.64 MS
MV VALVE AREA BY CONTINUITY EQUATION: 2.71 CM2
MV VALVE AREA P 1/2 METHOD: 3.03 CM2
OHS CV RV/LV RATIO: 0.84 CM
OHS LV EJECTION FRACTION SIMPSONS BIPLANE MOD: 65 %
PISA AR MAX VEL: 4 M/S
PISA MRMAX VEL: 3.09 M/S
PISA TR MAX VEL: 2.18 M/S
PULM VEIN S/D RATIO: 2.23
PV PEAK D VEL: 0.26 M/S
PV PEAK GRADIENT: 2 MMHG
PV PEAK S VEL: 0.58 M/S
PV PEAK VELOCITY: 0.72 M/S
RA PRESSURE ESTIMATED: 3 MMHG
RA VOL SYS: 30.89 ML
RIGHT ATRIAL AREA: 12.5 CM2
RIGHT ATRIUM VOLUME AREA LENGTH APICAL 4 CHAMBER: 28.52 ML
RIGHT VENTRICLE DIASTOLIC BASEL DIMENSION: 3.2 CM
RV TB RVSP: 5 MMHG
RV TISSUE DOPPLER FREE WALL SYSTOLIC VELOCITY 1 (APICAL 4 CHAMBER VIEW): 12.85 CM/S
SINUS: 3.5 CM
STJ: 3.2 CM
TDI LATERAL: 0.11 M/S
TDI SEPTAL: 0.08 M/S
TDI: 0.1 M/S
TR MAX PG: 19 MMHG
TRICUSPID ANNULAR PLANE SYSTOLIC EXCURSION: 1.68 CM
TV REST PULMONARY ARTERY PRESSURE: 22 MMHG
Z-SCORE OF LEFT VENTRICULAR DIMENSION IN END DIASTOLE: -1.91
Z-SCORE OF LEFT VENTRICULAR DIMENSION IN END SYSTOLE: -1.7

## 2024-11-01 PROCEDURE — 93306 TTE W/DOPPLER COMPLETE: CPT

## 2024-11-01 PROCEDURE — 93306 TTE W/DOPPLER COMPLETE: CPT | Mod: 26,,, | Performed by: INTERNAL MEDICINE

## 2024-12-20 DIAGNOSIS — M54.16 LUMBAR RADICULOPATHY: ICD-10-CM

## 2024-12-23 RX ORDER — MELOXICAM 7.5 MG/1
7.5 TABLET ORAL DAILY PRN
Qty: 30 TABLET | Refills: 2 | Status: SHIPPED | OUTPATIENT
Start: 2024-12-23

## 2025-01-15 ENCOUNTER — PATIENT MESSAGE (OUTPATIENT)
Dept: PODIATRY | Facility: CLINIC | Age: 75
End: 2025-01-15
Payer: MEDICARE

## 2025-01-17 ENCOUNTER — TELEPHONE (OUTPATIENT)
Dept: PODIATRY | Facility: CLINIC | Age: 75
End: 2025-01-17
Payer: MEDICARE

## 2025-01-17 NOTE — TELEPHONE ENCOUNTER
Spoke with pt in regards to r/s appt due to weather. Pt states to cancel appt and she will call back to r/s when she comes back into town.

## 2025-02-18 DIAGNOSIS — Z78.0 ASYMPTOMATIC MENOPAUSAL STATE: ICD-10-CM

## 2025-02-18 RX ORDER — LANOLIN ALCOHOL/MO/W.PET/CERES
1 CREAM (GRAM) TOPICAL 2 TIMES DAILY WITH MEALS
Qty: 180 TABLET | Refills: 3 | Status: SHIPPED | OUTPATIENT
Start: 2025-02-18

## 2025-02-20 ENCOUNTER — TELEPHONE (OUTPATIENT)
Dept: INTERNAL MEDICINE | Facility: CLINIC | Age: 75
End: 2025-02-20

## 2025-03-15 DIAGNOSIS — E78.5 HYPERLIPIDEMIA, UNSPECIFIED HYPERLIPIDEMIA TYPE: ICD-10-CM

## 2025-03-15 DIAGNOSIS — E78.5 DYSLIPIDEMIA: ICD-10-CM

## 2025-03-17 RX ORDER — ATORVASTATIN CALCIUM 10 MG/1
10 TABLET, FILM COATED ORAL
Qty: 90 TABLET | Refills: 3 | Status: SHIPPED | OUTPATIENT
Start: 2025-03-17

## 2025-03-25 ENCOUNTER — LAB VISIT (OUTPATIENT)
Dept: LAB | Facility: HOSPITAL | Age: 75
End: 2025-03-25
Attending: STUDENT IN AN ORGANIZED HEALTH CARE EDUCATION/TRAINING PROGRAM
Payer: MEDICARE

## 2025-03-25 ENCOUNTER — OFFICE VISIT (OUTPATIENT)
Dept: INTERNAL MEDICINE | Facility: CLINIC | Age: 75
End: 2025-03-25
Payer: MEDICARE

## 2025-03-25 VITALS
SYSTOLIC BLOOD PRESSURE: 148 MMHG | DIASTOLIC BLOOD PRESSURE: 70 MMHG | BODY MASS INDEX: 24.59 KG/M2 | WEIGHT: 133.63 LBS | HEART RATE: 74 BPM | HEIGHT: 62 IN

## 2025-03-25 DIAGNOSIS — Z78.0 ASYMPTOMATIC MENOPAUSAL STATE: ICD-10-CM

## 2025-03-25 DIAGNOSIS — Z00.00 ANNUAL PHYSICAL EXAM: Primary | ICD-10-CM

## 2025-03-25 DIAGNOSIS — E78.2 MIXED HYPERLIPIDEMIA: Chronic | ICD-10-CM

## 2025-03-25 DIAGNOSIS — H81.13 BENIGN PAROXYSMAL POSITIONAL VERTIGO DUE TO BILATERAL VESTIBULAR DISORDER: Chronic | ICD-10-CM

## 2025-03-25 DIAGNOSIS — M54.16 LUMBAR RADICULOPATHY: ICD-10-CM

## 2025-03-25 DIAGNOSIS — M19.042 PRIMARY OSTEOARTHRITIS OF BOTH HANDS: ICD-10-CM

## 2025-03-25 DIAGNOSIS — R73.03 PREDIABETES: Chronic | ICD-10-CM

## 2025-03-25 DIAGNOSIS — R79.9 ABNORMAL FINDING OF BLOOD CHEMISTRY, UNSPECIFIED: ICD-10-CM

## 2025-03-25 DIAGNOSIS — M79.674 PAIN OF TOE OF RIGHT FOOT: ICD-10-CM

## 2025-03-25 DIAGNOSIS — J30.9 ALLERGIC RHINITIS, UNSPECIFIED SEASONALITY, UNSPECIFIED TRIGGER: ICD-10-CM

## 2025-03-25 DIAGNOSIS — M19.041 PRIMARY OSTEOARTHRITIS OF BOTH HANDS: ICD-10-CM

## 2025-03-25 DIAGNOSIS — Z00.00 HEALTHCARE MAINTENANCE: ICD-10-CM

## 2025-03-25 DIAGNOSIS — I10 PRIMARY HYPERTENSION: Chronic | ICD-10-CM

## 2025-03-25 DIAGNOSIS — I10 PRIMARY HYPERTENSION: ICD-10-CM

## 2025-03-25 DIAGNOSIS — R92.30 DENSE BREAST: ICD-10-CM

## 2025-03-25 DIAGNOSIS — E78.5 HYPERLIPIDEMIA, UNSPECIFIED HYPERLIPIDEMIA TYPE: ICD-10-CM

## 2025-03-25 DIAGNOSIS — E78.5 DYSLIPIDEMIA: ICD-10-CM

## 2025-03-25 DIAGNOSIS — R73.03 PREDIABETES: ICD-10-CM

## 2025-03-25 DIAGNOSIS — Z12.11 COLON CANCER SCREENING: ICD-10-CM

## 2025-03-25 PROBLEM — Z63.4 BEREAVEMENT WITHOUT COMPLICATION: Status: RESOLVED | Noted: 2022-09-13 | Resolved: 2025-03-25

## 2025-03-25 PROBLEM — M19.049 PRIMARY OSTEOARTHRITIS OF HAND: Status: ACTIVE | Noted: 2025-03-25

## 2025-03-25 LAB
ABSOLUTE EOSINOPHIL (OHS): 0.15 K/UL
ABSOLUTE MONOCYTE (OHS): 0.54 K/UL (ref 0.3–1)
ABSOLUTE NEUTROPHIL COUNT (OHS): 2.69 K/UL (ref 1.8–7.7)
ALBUMIN SERPL BCP-MCNC: 4.2 G/DL (ref 3.5–5.2)
ALP SERPL-CCNC: 122 UNIT/L (ref 40–150)
ALT SERPL W/O P-5'-P-CCNC: 15 UNIT/L (ref 10–44)
ANION GAP (OHS): 12 MMOL/L (ref 8–16)
AST SERPL-CCNC: 15 UNIT/L (ref 11–45)
BASOPHILS # BLD AUTO: 0.03 K/UL
BASOPHILS NFR BLD AUTO: 0.6 %
BILIRUB SERPL-MCNC: 0.8 MG/DL (ref 0.1–1)
BUN SERPL-MCNC: 9 MG/DL (ref 8–23)
CALCIUM SERPL-MCNC: 9.5 MG/DL (ref 8.7–10.5)
CHLORIDE SERPL-SCNC: 102 MMOL/L (ref 95–110)
CHOLEST SERPL-MCNC: 173 MG/DL (ref 120–199)
CHOLEST/HDLC SERPL: 3.7 {RATIO} (ref 2–5)
CO2 SERPL-SCNC: 27 MMOL/L (ref 23–29)
CREAT SERPL-MCNC: 0.6 MG/DL (ref 0.5–1.4)
EAG (OHS): 123 MG/DL (ref 68–131)
ERYTHROCYTE [DISTWIDTH] IN BLOOD BY AUTOMATED COUNT: 13.2 % (ref 11.5–14.5)
GFR SERPLBLD CREATININE-BSD FMLA CKD-EPI: >60 ML/MIN/1.73/M2
GLUCOSE SERPL-MCNC: 106 MG/DL (ref 70–110)
HBA1C MFR BLD: 5.9 % (ref 4–5.6)
HCT VFR BLD AUTO: 40.4 % (ref 37–48.5)
HDLC SERPL-MCNC: 47 MG/DL (ref 40–75)
HDLC SERPL: 27.2 % (ref 20–50)
HGB BLD-MCNC: 13.6 GM/DL (ref 12–16)
IMM GRANULOCYTES # BLD AUTO: 0.02 K/UL (ref 0–0.04)
IMM GRANULOCYTES NFR BLD AUTO: 0.4 % (ref 0–0.5)
LDLC SERPL CALC-MCNC: 112.2 MG/DL (ref 63–159)
LYMPHOCYTES # BLD AUTO: 1.74 K/UL (ref 1–4.8)
MCH RBC QN AUTO: 29.8 PG (ref 27–50)
MCHC RBC AUTO-ENTMCNC: 33.7 G/DL (ref 32–36)
MCV RBC AUTO: 89 FL (ref 82–98)
NONHDLC SERPL-MCNC: 126 MG/DL
NUCLEATED RBC (/100WBC) (OHS): 0 /100 WBC
PLATELET # BLD AUTO: 228 K/UL (ref 150–450)
PMV BLD AUTO: 10.8 FL (ref 9.2–12.9)
POTASSIUM SERPL-SCNC: 3.8 MMOL/L (ref 3.5–5.1)
PROT SERPL-MCNC: 7.1 GM/DL (ref 6–8.4)
RBC # BLD AUTO: 4.56 M/UL (ref 4–5.4)
RELATIVE EOSINOPHIL (OHS): 2.9 %
RELATIVE LYMPHOCYTE (OHS): 33.7 % (ref 18–48)
RELATIVE MONOCYTE (OHS): 10.4 % (ref 4–15)
RELATIVE NEUTROPHIL (OHS): 52 % (ref 38–73)
SODIUM SERPL-SCNC: 141 MMOL/L (ref 136–145)
TRIGL SERPL-MCNC: 69 MG/DL (ref 30–150)
WBC # BLD AUTO: 5.17 K/UL (ref 3.9–12.7)

## 2025-03-25 PROCEDURE — 83036 HEMOGLOBIN GLYCOSYLATED A1C: CPT

## 2025-03-25 PROCEDURE — 99999 PR PBB SHADOW E&M-EST. PATIENT-LVL IV: CPT | Mod: PBBFAC,,, | Performed by: STUDENT IN AN ORGANIZED HEALTH CARE EDUCATION/TRAINING PROGRAM

## 2025-03-25 PROCEDURE — 82040 ASSAY OF SERUM ALBUMIN: CPT

## 2025-03-25 PROCEDURE — 36415 COLL VENOUS BLD VENIPUNCTURE: CPT

## 2025-03-25 PROCEDURE — 84478 ASSAY OF TRIGLYCERIDES: CPT

## 2025-03-25 PROCEDURE — 85025 COMPLETE CBC W/AUTO DIFF WBC: CPT

## 2025-03-25 RX ORDER — TELMISARTAN AND HYDROCHLORTHIAZIDE 80; 25 MG/1; MG/1
1 TABLET ORAL DAILY
Qty: 90 TABLET | Refills: 3 | Status: SHIPPED | OUTPATIENT
Start: 2025-03-25 | End: 2026-03-25

## 2025-03-25 RX ORDER — DICLOFENAC SODIUM 10 MG/G
2 GEL TOPICAL DAILY
Qty: 100 G | Refills: 3 | Status: SHIPPED | OUTPATIENT
Start: 2025-03-25 | End: 2025-06-23

## 2025-03-25 RX ORDER — MECLIZINE HCL 12.5 MG 12.5 MG/1
12.5 TABLET ORAL 3 TIMES DAILY PRN
Qty: 90 TABLET | Refills: 1 | Status: SHIPPED | OUTPATIENT
Start: 2025-03-25 | End: 2026-03-25

## 2025-03-25 RX ORDER — GABAPENTIN 100 MG/1
100 CAPSULE ORAL NIGHTLY
Qty: 90 CAPSULE | Refills: 3 | Status: SHIPPED | OUTPATIENT
Start: 2025-03-25 | End: 2026-03-25

## 2025-03-25 RX ORDER — MELOXICAM 7.5 MG/1
7.5 TABLET ORAL DAILY PRN
Qty: 90 TABLET | Refills: 1 | Status: SHIPPED | OUTPATIENT
Start: 2025-03-25 | End: 2026-03-25

## 2025-03-25 RX ORDER — ATORVASTATIN CALCIUM 10 MG/1
10 TABLET, FILM COATED ORAL DAILY
Qty: 90 TABLET | Refills: 3 | Status: SHIPPED | OUTPATIENT
Start: 2025-03-25 | End: 2026-03-25

## 2025-03-25 RX ORDER — TELMISARTAN AND HYDROCHLORTHIAZIDE 80; 25 MG/1; MG/1
1 TABLET ORAL DAILY
COMMUNITY
End: 2025-03-25 | Stop reason: SDUPTHER

## 2025-03-25 RX ORDER — AMLODIPINE BESYLATE 5 MG/1
5 TABLET ORAL DAILY
Qty: 90 TABLET | Refills: 3 | Status: SHIPPED | OUTPATIENT
Start: 2025-03-25 | End: 2026-03-25

## 2025-03-25 NOTE — PATIENT INSTRUCTIONS
Puedes usar Osteoarthritis gloves    Hacerse vacunar contra el Shingles (Shingrix)    Hacer el examen de la materia fecal para screening de cancer de colon    Agendar la mamografia y la densitometria

## 2025-03-25 NOTE — PROGRESS NOTES
Subjective:       Patient ID: Nasima Pacheco is a 74 y.o. female.    Chief Complaint: Annual Exam      Nasima Pacheco is a 74 y.o. female , Chadian speaking, with a history of:  HTN  HLD  BPPV  OA  Back pain  Lumbar radiculopathy      [Local Patient]  Originally from [place]  Lives in: [place] [id number]       Patient comes to the clinic for a her annual physical exam    HYPERTENSION:  She rarely checks blood pressure at home but reports good readings when measured. She experienced an unusual headache last week. She continues Micardis and amlodipine.    MUSCULOSKELETAL PAIN:  She has arthritis in hands with progressive deformity requiring removal of rings. She experiences severe back pain which she describes as intense. She uses OTC patches and takes Meloxicam when pain is severe. Physical therapy has not improved her symptoms.    PREDIABETES:  She has prediabetes with hemoglobin A1c at 5.9. She reports actively avoiding sweets and has stopped baking to prevent consumption.    NEUROLOGICAL:  She experiences sporadic episodes of intense but brief dizziness. She takes medication as needed when she feels the onset of dizziness symptoms.    CURRENT MEDICATIONS:  She continues Micardis, gabapentin, atorvastatin, amlodipine, meloxicam, and vitamin D supplementation.    No other complaints.    Latest PCP visits:      9/23/24 HTN f/u  07/01/24 Lumbar radiculopathy  3/21/24 AWV  1/22/24 Hospital discharge f/u vertigo  10/02/23 HTN - increased dose of Micardis to 80-25  09/05/23 HTN - dc amlodipine, started patient on Micardis at low dose. .    Changes in health or medications: No    Specialists visits and recommendations:   10/30/23 Cardiology: added amlodipine 5 mg qd      H/o ER or Urgent care visits:   NO    H/o Hospitalizations:  NO    H/o falls: None     Life events / lifestyle:   Nothing new      Most recent / available laboratories reviewed with the patient:    Recent Labs   Lab 01/08/24  0341 03/21/24  2809  03/25/25  0756   WBC 6.08 5.88 5.17   Hemoglobin 12.6 13.8  --    HGB  --   --  13.6   Hematocrit 34.6 L 40.5  --    HCT  --   --  40.4   MCV 83 90 89   Platelet Count  --   --  228   Platelets 202 242  --        Recent Labs   Lab 03/29/22  0957 09/13/22  1426 03/13/23  1013 01/08/24  0341 03/21/24  1049 03/25/25  0756   Glucose 92   < > 105 133 H 100  --    Sodium 143   < > 143 140 141 141   Potassium 4.1   < > 4.1 3.7 4.2 3.8   BUN 14   < > 11 13 10 9   Creatinine 0.7   < > 0.6 0.6 0.6 0.6   eGFR if non  >60.0  --   --   --   --   --    Total Bilirubin 1.1 H  --  0.7 0.6 1.0  --    Bilirubin Total  --   --   --   --   --  0.8   AST 17  --  15 13 16 15   ALT 12  --  15 14 16 15    < > = values in this interval not displayed.       Recent Labs   Lab 03/13/23  1013 03/21/24  1049 03/25/25  0756   Hemoglobin A1C 5.9 H 5.9 H  --    Hemoglobin A1c  --   --  5.9 H       Recent Labs   Lab 03/29/22  0957 03/13/23  1013 03/21/24  1049 03/25/25  0756   Cholesterol Total  --   --   --  173   Cholesterol 225 H 171 206 H  --    Triglycerides 121 64 108  --    Triglyceride  --   --   --  69   HDL 57 52 44  --    HDL Cholesterol  --   --   --  47   LDL Cholesterol 143.8 106.2 140.4  --    Non-HDL Cholesterol 168 119 162  --    Non HDL Cholesterol  --   --   --  126       Recent Labs   Lab 03/13/23  1013   TSH 2.131               Current medications:    Current Outpatient Medications:     ammonium lactate 12 % Crea, Apply 1 g topically Daily., Disp: 140 g, Rfl: 1    OYSTER SHELL CALCIUM-VIT D3 500 mg-5 mcg (200 unit) per tablet, TAKE 1 TABLET BY MOUTH TWICE A DAY WITH MEALS, Disp: 180 tablet, Rfl: 3    amLODIPine (NORVASC) 5 MG tablet, Take 1 tablet (5 mg total) by mouth once daily., Disp: 90 tablet, Rfl: 3    atorvastatin (LIPITOR) 10 MG tablet, Take 1 tablet (10 mg total) by mouth once daily., Disp: 90 tablet, Rfl: 3    diclofenac sodium (VOLTAREN) 1 % Gel, Apply 2 g topically once daily., Disp: 100 g, Rfl: 3     "gabapentin (NEURONTIN) 100 MG capsule, Take 1 capsule (100 mg total) by mouth every evening., Disp: 90 capsule, Rfl: 3    meclizine (ANTIVERT) 12.5 mg tablet, Take 1 tablet (12.5 mg total) by mouth 3 (three) times daily as needed for Dizziness. AS NEEDED, Disp: 90 tablet, Rfl: 1    meloxicam (MOBIC) 7.5 MG tablet, Take 1 tablet (7.5 mg total) by mouth daily as needed for Pain., Disp: 90 tablet, Rfl: 1    telmisartan-hydrochlorothiazide (MICARDIS HCT) 80-25 mg per tablet, Take 1 tablet by mouth once daily., Disp: 90 tablet, Rfl: 3    varicella-zoster [id number], PF, (SHINGRIX) 50 mcg/0.5 mL injection, Inject 0.5 mLs into the muscle once. for 1 dose, Disp: 1 each, Rfl: 1      Healthcare Maintenance:  Colonoscopy: FOBT   Vaccinations: Pneumonia 2016, Zoster (no), Tetanus 2023  COVID vaccination: completed x 2  Depression screening: PHQ2 score = 0     Annual Wellness visit approx. Month: March ROS 11-point review of systems done. Negative except for detailed in the HPI.        Objective:      BP (!) 148/70 (Patient Position: Sitting)   Pulse 74   Ht 5' 2" (1.575 m)   Wt 60.6 kg (133 lb 9.6 oz)   BMI 24.44 kg/m²      Physical Exam   General appearance: Good general aspect, NAD, conversant   Eyes and HEENT: Normal sclerae, moist mucous membranes, no thyromegaly or lymphadenopathy  Respiratory: No work of breathing, clear to auscultation bilaterally. No rales, rhonchi, wheezing, or rubs.  Cardiovascular: PMI not displaced. RRR. Normal S1, S2. No murmurs, rubs or gallops.  Abdomen and : Soft, non-tender, nondistended, BS, no hepatosplenomegaly, no masses.  Extremities: no edemas, no extremity lymphadenopathy, no clubbing, no cyanosis.  Nervous System: Alert and oriented x 3, good concentration, no deficits.  Skin: Normal temperature, turgor and texture; no rash, ulcers or subcutaneous nodules  Psych: Appropriate affect, alert and oriented to person, place and time          Assessment:       1. Annual physical " exam  Assessment & Plan:  Patient is in overall good general health.  Stable medical conditions listed on the PMH.  Labs reviewed and patient notified.        2. Primary hypertension  Assessment & Plan:  Controlled.  Will continue same management:  Valsartan 80 + HCTZ 25  Amlodipine 5 mg qd  Refilled    Recommended to monitor blood pressure regularly, eat a well-balanced diet that's low in salt, DASH diet, enjoy regular physical activity, manage stress, maintain a healthy weight, take medications properly.      Orders:  -     amLODIPine (NORVASC) 5 MG tablet; Take 1 tablet (5 mg total) by mouth once daily.  Dispense: 90 tablet; Refill: 3  -     telmisartan-hydrochlorothiazide (MICARDIS HCT) 80-25 mg per tablet; Take 1 tablet by mouth once daily.  Dispense: 90 tablet; Refill: 3    3. Hyperlipidemia, unspecified hyperlipidemia type  Assessment & Plan:  Lab Results   Component Value Date    CHOL 173 03/25/2025    CHOL 206 (H) 03/21/2024    CHOL 171 03/13/2023     Lab Results   Component Value Date    HDL 47 03/25/2025    HDL 44 03/21/2024    HDL 52 03/13/2023     Lab Results   Component Value Date    LDLCALC 140.4 03/21/2024    LDLCALC 106.2 03/13/2023    LDLCALC 143.8 03/29/2022     Lab Results   Component Value Date    TRIG 69 03/25/2025    TRIG 108 03/21/2024    TRIG 64 03/13/2023       Lab Results   Component Value Date    CHOLHDL 27.2 03/25/2025    CHOLHDL 21.4 03/21/2024    CHOLHDL 30.4 03/13/2023     Continue statin  Refilled.    Orders:  -     atorvastatin (LIPITOR) 10 MG tablet; Take 1 tablet (10 mg total) by mouth once daily.  Dispense: 90 tablet; Refill: 3    4. Prediabetes  Assessment & Plan:  Lab Results   Component Value Date    HGBA1C 5.9 (H) 03/25/2025     Unchanged.  We will continue to manage with lifestyle modifications, diet, weight loss and physical activity.  Education provided.      Orders:  -     Basic Metabolic Panel; Future; Expected date: 09/25/2025  -     Hemoglobin A1C; Future; Expected  date: 09/25/2025    5. Dyslipidemia  -     atorvastatin (LIPITOR) 10 MG tablet; Take 1 tablet (10 mg total) by mouth once daily.  Dispense: 90 tablet; Refill: 3    6. Mixed hyperlipidemia  Assessment & Plan:  Lab Results   Component Value Date    CHOL 173 03/25/2025    CHOL 206 (H) 03/21/2024    CHOL 171 03/13/2023     Lab Results   Component Value Date    HDL 47 03/25/2025    HDL 44 03/21/2024    HDL 52 03/13/2023     Lab Results   Component Value Date    LDLCALC 140.4 03/21/2024    LDLCALC 106.2 03/13/2023    LDLCALC 143.8 03/29/2022     Lab Results   Component Value Date    TRIG 69 03/25/2025    TRIG 108 03/21/2024    TRIG 64 03/13/2023       Lab Results   Component Value Date    CHOLHDL 27.2 03/25/2025    CHOLHDL 21.4 03/21/2024    CHOLHDL 30.4 03/13/2023     Continue statin  Refilled.      7. Primary osteoarthritis of both hands  Assessment & Plan:  I have recommended to use topical diclofenac for pain, patient may also tried to use gloves at night to have some relief of pain.      8. Lumbar radiculopathy  Assessment & Plan:  Mild improvement of symptoms status post physical therapy.    We will continue treatment with gabapentin and meloxicam for pain.    I am referring again patient to the healthy back program of [facility].    Orders:  -     gabapentin (NEURONTIN) 100 MG capsule; Take 1 capsule (100 mg total) by mouth every evening.  Dispense: 90 capsule; Refill: 3  -     meloxicam (MOBIC) 7.5 MG tablet; Take 1 tablet (7.5 mg total) by mouth daily as needed for Pain.  Dispense: 90 tablet; Refill: 1  -     Ambulatory referral/consult to [facility]; Future; Expected date: 04/01/2025    9. Benign paroxysmal positional vertigo due to bilateral vestibular disorder  Assessment & Plan:  Significant improvement.    Patient may continue meclizine as needed.    Orders:  -     meclizine (ANTIVERT) 12.5 mg tablet; Take 1 tablet (12.5 mg total) by mouth 3 (three) times daily as needed for Dizziness. AS NEEDED   Dispense: 90 tablet; Refill: 1    10. Allergic rhinitis, unspecified seasonality, unspecified trigger  Assessment & Plan:  Stable, asymptomatic.    Patient may use oral antihistamine as needed.      11. Pain of toe of right foot  -     diclofenac sodium (VOLTAREN) 1 % Gel; Apply 2 g topically once daily.  Dispense: 100 g; Refill: 3    12. Dense breast  -     Mammo Digital Diagnostic Bilat; Future; Expected date: 03/25/2025    13. Colon cancer screening  -     Fecal Immunochemical Test (iFOBT); Future; Expected date: 03/25/2025    14. Asymptomatic menopausal state  -     DXA Bone Density Appendicular Skeleton; Future; Expected date: 03/25/2025    15. Healthcare maintenance  Assessment & Plan:  Health care maintenance and core gaps reviewed and assessed with patient.  Vaccinations recommended:        Tetanus - O       Shingles - O       Influenza - 2022       Pneumonia - 2016  Colon cancer screening:   FOBT Ordered  Lifestyle recommendations given.  Physical activity recommended.    Legend: Ordered (O), Declined (D), Current (C)      Orders:  -     Zoster Recombinant Vaccine; Future         Summary of orders / plan:         IMPRESSION:   Chronic conditions are well-controlled.   Prediabetes remains stable with HbA1c at 5.9%, continued management with diet alone.   Osteoarthritis in hands causing deformity, recommended topical treatment and compression gloves.   Chronic back pain persists, requiring ongoing management with physical therapy.   BP well-controlled based on home measurements.   Cholesterol and triglycerides at optimal levels, continued current statin therapy.   Kidney and liver function normal.   Sporadic dizziness continues, maintained current management approach.     PLAN SUMMARY:   Referral to spine specialists for evaluation and management   Recommend osteoarthritis gloves for hand pain management   Prescribed meloxicam for pain management   Refilled medication for dizziness   Continue Micardis and  amlodipine for blood pressure management   Continue diet management for prediabetes   Referred to [facility] for continued physical therapy   Apply topical ointment to hand joints before wearing gloves for additional pain relief Carry dizziness medication for emergencies   Follow-up to monitor sugar levels and assess need for medication   All medications refilled  FOBT  DEXA  MAMMO      Problem list updated  Medications reconciled  Education provided  Lifestyle recommendations given  AVS generated, explained, and sent to the patient.  RTC in : 6 months for f/u of HTN and prediabetes, labs ordered  March for AWV,labs not ordered            OLGA PHILLIPS MD, MPH  Internal Medicine  [facility]  [facility]

## 2025-03-25 NOTE — ASSESSMENT & PLAN NOTE
I have recommended to use topical diclofenac for pain, patient may also tried to use gloves at night to have some relief of pain.

## 2025-03-25 NOTE — ASSESSMENT & PLAN NOTE
Lab Results   Component Value Date    CHOL 173 03/25/2025    CHOL 206 (H) 03/21/2024    CHOL 171 03/13/2023     Lab Results   Component Value Date    HDL 47 03/25/2025    HDL 44 03/21/2024    HDL 52 03/13/2023     Lab Results   Component Value Date    LDLCALC 140.4 03/21/2024    LDLCALC 106.2 03/13/2023    LDLCALC 143.8 03/29/2022     Lab Results   Component Value Date    TRIG 69 03/25/2025    TRIG 108 03/21/2024    TRIG 64 03/13/2023       Lab Results   Component Value Date    CHOLHDL 27.2 03/25/2025    CHOLHDL 21.4 03/21/2024    CHOLHDL 30.4 03/13/2023     Continue statin  Refilled.

## 2025-03-25 NOTE — ASSESSMENT & PLAN NOTE
Lab Results   Component Value Date    HGBA1C 5.9 (H) 03/25/2025     Unchanged.  We will continue to manage with lifestyle modifications, diet, weight loss and physical activity.  Education provided.

## 2025-03-25 NOTE — ASSESSMENT & PLAN NOTE
Mild improvement of symptoms status post physical therapy.    We will continue treatment with gabapentin and meloxicam for pain.    I am referring again patient to the [facility] program of [facility].

## 2025-04-14 ENCOUNTER — CLINICAL SUPPORT (OUTPATIENT)
Dept: REHABILITATION | Facility: HOSPITAL | Age: 75
End: 2025-04-14
Attending: STUDENT IN AN ORGANIZED HEALTH CARE EDUCATION/TRAINING PROGRAM
Payer: MEDICARE

## 2025-04-14 DIAGNOSIS — R29.898 DECREASED STRENGTH OF TRUNK AND BACK: ICD-10-CM

## 2025-04-14 DIAGNOSIS — M25.69 DECREASED ROM OF TRUNK AND BACK: Primary | ICD-10-CM

## 2025-04-14 DIAGNOSIS — M54.16 LUMBAR RADICULOPATHY: ICD-10-CM

## 2025-04-14 PROCEDURE — 97530 THERAPEUTIC ACTIVITIES: CPT

## 2025-04-14 PROCEDURE — 97161 PT EVAL LOW COMPLEX 20 MIN: CPT

## 2025-04-14 NOTE — PROGRESS NOTES
Outpatient Rehab    Physical Therapy Evaluation    Patient Name: Ines Pacheco  MRN: [id number]  YOB: 1950  Encounter Date: 4/14/2025    Therapy Diagnosis:   Encounter Diagnoses   Name Primary?    Lumbar radiculopathy     Decreased ROM of trunk and back Yes    Decreased strength of trunk and back      Physician: Suellen Rao MD    Physician Orders: Eval and Treat  Medical Diagnosis: Lumbar radiculopathy    Visit # / Visits Authorized:  1 / 1  Insurance Authorization Period: 3/25/2025 to 12/31/2025  Date of Evaluation: 4/14/2025  Plan of Care Certification: 4/14/2025 to 7/14/25     Time In:   1230  Time Out:  130  Total Time:   60  Total Billable Time:  60    Intake Outcome Measure for FOTO Survey    Therapist reviewed FOTO scores for Ines Pacheco on 4/14/2025.   FOTO report - see Media section or FOTO account episode details.     Intake Score: 57%         Subjective   History of Present Illness  Ines is a 74 y.o. female who reports to physical therapy with a chief concern of back pain.         Diagnostic tests related to this condition: X-ray and MRI studies.   MRI Studies Details: 1. Lumbar spondylosis as detailed above.  Multilevel mild-to-moderate neural foraminal narrowing.  No spinal canal stenosis.  2. Left facet arthropathy at T11-T12 with an associated joint effusion and an anteriorly oriented synovial cyst that contributes to moderate left neural foraminal narrowing with possible impingement of the exiting T11 nerve root, incompletely evaluated on this lumbar spine MRI.  X-Ray Details: Lumbar dextrocurvature.  Lumbar spine vertebral body heights appear maintained.  Similar lumbar discogenic change with variable disc space narrowing most notable at L4-L5 and L5-S1.  Lower facet DJD.  No evidence for lytic or blastic lesion.  Mild abdominal aortic atherosclerosis.    History of Present Condition/Illness: History of present illness:  chronic LBP without incident.   Pain location(s): from  "upper back to LB ,bilaterally and descriptions: Achy/sharp at times     Worst   10/10.   Best  3 /10.   Current  7 /10    Constant or intermittent   constant   Worse with bending  yes  Bowel /Bladder issues:   no Functional changes:   Difficulty with getting dishes out of , gardening and cleaning    Worse with:  AM, bending,cleaning  Better with:   sitting/laying down    Social and Living situation: lives with      Leisure:   garden,   Exercise:   no  Gym:   no   Sleep:  does not wake up due to pain   Previous treatment:   no  Patient goals:   "decrease pain"    Cough/Sneeze Strain: (--)  Bladder/Bowel: (--)  Falls: (--)  Night pain: (--)  Unexplained weight loss: (--)   PAIN PATTERN  1         Past Medical History/Physical Systems Review:   Nasima Pacheco  has a past medical history of Arthritis, Bereavement without complication, Cataract, Hyperlipidemia, Hypertension, and Mild major depression, single episode.    Nasima aPcheco  has a past surgical history that includes Hysterectomy; Eye surgery; Cataract extraction w/  intraocular lens implant (Right, 2017); Cataract extraction w/  intraocular lens implant (Left, 2017); Oophorectomy; and  section.    Nasima has a current medication list which includes the following prescription(s): amlodipine, ammonium lactate, atorvastatin, diclofenac sodium, gabapentin, meclizine, meloxicam, oyster shell calcium-vit d3, and telmisartan-hydrochlorothiazide.    Review of patient's allergies indicates:  No Known Allergies     Objective   Posture  Patient presents with a Forward head position. Flat cervical spine and Increased thoracic kyphosis is observed.            Posture seated: poor    Posture standing: poor    Correction with lumbar roll effect: better   Other ergonimic considerations: n/a    Lower Extremity Sensation  Right Lumbar/Lower Extremity Sensation  Intact: Light Touch       Left Lumbar/Lower Extremity " Sensation  Intact: Light Touch                Spinal Mobility  Hypomobile: Thoracic and Lumbosacral           Lumbar Range of Motion   MOVEMENT LOSS - LUMBAR FLEXION   Norms ROM Loss Initial Flexion Fingers touch toes, sacral angle >/= 70 deg, uniform spinal curvature, posterior weight shift  moderate loss  with poor curve revrsal LUMBAR EXTENSION  Norms  ASIS surpasses toes, spine of scapulae surpasses heels, uniform spinal curve major loss SIDE GLIDE RIGHT   minimal loss SIDE GLIDE LEFT   minimal loss ROTATION Right PT observes contralateral shoulder minimal loss ROTATION LEFT  PT observes contralateral shoulder minimal loss                        Repeat standing flexion    no pain same          repeat standing extension   pain with movement,  no worse    slump better   slouch better         supine repeat flexion   no worse          ANALY     worse            EIL worse                 Knee Strength   Right Strength Right Pain Left Strength Left  Pain   Flexion (S2) 4   4     Prone Flexion           Extension (L3) 4   4                Lumbar/Pelvic Girdle Special Tests       Lumbar Tests - SLR and Tension  Negative: Right Passive Straight Leg Raise, Left Passive Straight Leg Raise, Right Crossed Straight Leg Raise, and Left Crossed Straight Leg Raise                 Pelvic Girdle / Sacrum Tests  Negative: Right BRIAN and Left BRIAN         Hip Special Tests  Intra-Articular/Impingement Tests  Negative: Right BRIAN and Left BRIAN               Gait Analysis  Gait Pattern: Antalgic  Walking Speed: Decreased    Right Side Walking Observations  Decreased: Step Length       Left Side Walking Observations  Decreased: Step Length            Treatment:  Therapeutic Exercise  TE 1: LTR  TE 2: SOC  TE 3: BKTC  Therapeutic Activity  TA 1: - Patient was given an [facility] Visit 1 handout which discusses the following:  - what to expect in therapy  - an overview of the program, including health coaching and wellness  -  importance of spinal hygiene, proper posture, lifting mechanics, sleep quality, and nutrition/hydration   - Claudia roll trialed, recommended, and purchase information was provided.  - Patient received a handout regarding anticipated muscular soreness following the isometric test and strategies for management were reviewed with patient including stretching, using ice and scheduled rest.   - Patient received verbal education on the following:   - Healthy Back program   - purpose of the isometric test  - safe progression of lumbar strengthening, wellness approach, and systemic strengthening.   - safe usage of MedX machine and testing protocols.    Time Entry(in minutes):  PT Evaluation (Low) Time Entry: 45  Therapeutic Activity Time Entry: 15    Assessment & Plan   Assessment  Ines                 Prognosis: Good  Assessment Details: Pt presents with reported B LB to B cervical pain  that is reproduced with bending and reduced with rest . Upon physical assessment, pt demonstrates issues with posture ,  trunk strength, and ROM, decreased standing tolerance, decreased LE flexibility,57% FOTO ability score and decreased tolerance to ADL's secondary to pain.   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 Pt would benefit from comprehensive UE and LE and trunk mobility training, stability training,  improved cardiovascular and muscular endurance, neuromuscular re-education for posture, coordination, and muscular recruitment and education on positional offloading techniques to decrease the intensity and frequency of flare-ups.    Plan  From a physical therapy perspective, the patient would benefit from: Skilled Rehab Services    Planned therapy interventions include: Therapeutic exercise, Therapeutic activities, Neuromuscular re-education, Manual therapy, ADLs/IADLs, and Other (Comment).  Dry Needling (prn)  Planned modalities to include: Biofeedback, Electrical stimulation - attended, Electrical stimulation - passive/unattended, Thermotherapy (hot pack), and Cryotherapy (cold pack).                   This plan was discussed with Patient.   Discussion participants: Agreed Upon Plan of Care             Patient's spiritual, cultural, and educational needs considered and patient agreeable to plan of care and goals.           Goals:   Active       - Pt will demonstrate increased lumbar MedX ROM by at least 3 degrees from the initial ROM value with improvements noted in functional ROM and ability to perform ADLs. Appropriate and Ongoing        - Pt will demonstrate increased MedX average isometric strength value by 20% from initial test resulting in improved ability to perform bending, lifting, and carrying activities safely, confidently. Appropriate and Ongoing        Start:  04/15/25    Expected End:  05/14/25               - Pt will demonstrate increased lumbar MedX ROM by at least 9 degrees from initial ROM value, resulting in improved ability to perform functional forward bending while standing and sitting. Appropriate and Ongoing        - Pt will demonstrate increased MedX average isometric strength value by 40% from initial test resulting in improved ability to perform bending, lifting, and carrying activities safely and confidently. Appropriate and Ongoing        Start:  04/15/25    Expected End:  07/14/25            - Pt to demonstrate ability to independently control and reduce their pain through posture positioning and mechanical movements throughout a typical day. Appropriate and Ongoing        Start:  04/15/25    Expected End:  07/14/25                Linda Rai PT

## 2025-04-15 PROBLEM — M25.69 DECREASED ROM OF TRUNK AND BACK: Status: ACTIVE | Noted: 2025-04-14

## 2025-04-15 PROBLEM — R29.898 DECREASED STRENGTH OF TRUNK AND BACK: Status: ACTIVE | Noted: 2025-04-14

## 2025-04-29 ENCOUNTER — CLINICAL SUPPORT (OUTPATIENT)
Dept: REHABILITATION | Facility: HOSPITAL | Age: 75
End: 2025-04-29
Attending: STUDENT IN AN ORGANIZED HEALTH CARE EDUCATION/TRAINING PROGRAM
Payer: MEDICARE

## 2025-04-29 DIAGNOSIS — M25.69 DECREASED ROM OF TRUNK AND BACK: Primary | ICD-10-CM

## 2025-04-29 DIAGNOSIS — R29.898 DECREASED STRENGTH OF TRUNK AND BACK: ICD-10-CM

## 2025-04-29 PROCEDURE — 97110 THERAPEUTIC EXERCISES: CPT

## 2025-04-29 PROCEDURE — 97750 PHYSICAL PERFORMANCE TEST: CPT

## 2025-04-29 NOTE — PROGRESS NOTES
"  Outpatient Rehab    Physical Therapy Visit    Patient Name: Ines Pacheco  MRN: [id number]  YOB: 1950  Encounter Date: 4/29/2025    Therapy Diagnosis:   Encounter Diagnoses   Name Primary?    Decreased ROM of trunk and back Yes    Decreased strength of trunk and back      Physician: Suellen Rao MD    Physician Orders: Eval and Treat  Medical Diagnosis: Lumbar radiculopathy    Visit # / Visits Authorized:  1 / 10  Insurance Authorization Period: 3/26/2025 to 12/31/2025  Date of Evaluation: 4/14/25  Plan of Care Certification: 4/14/25 to 7/14/25     PT/PTA:     Number of PTA visits since last PT visit:   Time In:   11  Time Out:  1150  Total Time:   50  Total Billable Time:  45    FOTO:  Intake Score: 57%  Survey Score 1:  %  Survey Score 2:  %         Subjective   Pt reports she is experiencing 8/10 pain level.  Pain reported as 8/10.    Occupation:retired  Leisure: garden  Exercise or gym:no  Goals: " decrease pain"  Objective        Isometric Testing on Med X equipment: Testing administered by PT    Test Initial Baseline Midpoint Final   Date 4/29/25     ROM 6-36 deg     Max Peak Torque 61      Min Peak Torque 35      Flex/Ext Ratio 1.7:1     % variance  normative data 47     % change from initial test N/A visit 1         Outcomes:  Intake Score: 57%  Visit 6 Score:   Visit 10 Score:   Discharge Score:  Goal Score: 65%     Treatment:   Medical MedX Treatment as follows:  Time: 15  MedX testing performed: Patient  received neuromuscular education to engage spinal musculature correctly for motor control and engagement of musculature iincluding the MedX exercise component and practice and standard testing. MedX dynamic exercise and baseline isometric test performed with instructions to guide the patient safely through the testing procedure. Patient instructed to perform isometric test correctly and safely while building to an optimal force with a pain-free effort. Patient also instructed that they " should feel support/pressure from MedX restraints but no pain/discomfort, and encouraged to report any pain to therapist. Patient demonstrated appropriate understanding of information and tolerance of test.  Education regarding purpose of test, safety during test given, and reviewed possible more soreness and strategies.         4/29/2025    11:33 AM   [facility]   Visit Number 2   VAS Pain Rating 8   Time 5   Flexion in Lying 10   Lumbar Extension Seat Pad 2   Femur Restraint 6   Top Dead Center 24   Counterweight 217   Lumbar Flexion 36   Lumbar Extension 6   Lumbar Peak Torque 61 ft. lbs.   Min Torque 35   Test Percent Below Normative Data 47 %   Ice - Z Lie (in min.) 5               Patient participated in therapeutic exercises to develop strength, endurance, ROM, flexibility, posture, and core stabilization for 35 minutes including:     LTR 1ox   SOC  BKTC c/ ball  BKFO c/ YTB  Bridge c/ YTB (limited ROM)  Peripheral muscle strengthening which included one set of 15-20 repetitions at a slow and controlled 10-13 second per rep pace focused on strengthening supporting musculature in order to improve body mechanics and functional mobility. Patient and therapist focused on proper form during treatment to ensure optimal strengthening of each targeted muscle group.  Machines utilized included:  Hip ABD/ADD/seated row/tricep        Time Entry(in minutes):  Physical Performance Test (with Report) Time Entry: 15  Therapeutic Exercise Time Entry: 35    Assessment & Plan   Assessment: Pt arrives with 8/10 pain level today.  Reviewed HEP and added bridging and BKFO's.  Pt tested on Med X machine today.  Pt demonstrates a 47% deficit in strength compared to age norms.       Patient will continue to benefit from skilled outpatient physical therapy to address the deficits listed in the problem list box on initial evaluation, provide pt/family education and to maximize pt's level of independence in the home and  community environment.     Patient's spiritual, cultural, and educational needs considered and patient agreeable to plan of care and goals.           Plan:      Goals:   Active         - Pt will demonstrate increased lumbar MedX ROM by at least 3 degrees from the initial ROM value with improvements noted in functional ROM and ability to perform ADLs. Appropriate and Ongoing          - Pt will demonstrate increased MedX average isometric strength value by 20% from initial test resulting in improved ability to perform bending, lifting, and carrying activities safely, confidently. Appropriate and Ongoing          Start:  04/15/25    Expected End:  05/14/25                 - Pt will demonstrate increased lumbar MedX ROM by at least 9 degrees from initial ROM value, resulting in improved ability to perform functional forward bending while standing and sitting. Appropriate and Ongoing          - Pt will demonstrate increased MedX average isometric strength value by 40% from initial test resulting in improved ability to perform bending, lifting, and carrying activities safely and confidently. Appropriate and Ongoing          Start:  04/15/25    Expected End:  07/14/25              - Pt to demonstrate ability to independently control and reduce their pain through posture positioning and mechanical movements throughout a typical day. Appropriate and Ongoing          Start:  04/15/25    Expected End:  07/14/25                 Linda Rai PT

## 2025-05-26 DIAGNOSIS — Z00.00 ENCOUNTER FOR MEDICARE ANNUAL WELLNESS EXAM: ICD-10-CM

## 2025-06-17 DIAGNOSIS — H81.13 BENIGN PAROXYSMAL POSITIONAL VERTIGO DUE TO BILATERAL VESTIBULAR DISORDER: Chronic | ICD-10-CM

## 2025-06-17 RX ORDER — MECLIZINE HCL 12.5 MG 12.5 MG/1
12.5 TABLET ORAL 3 TIMES DAILY PRN
Qty: 90 TABLET | Refills: 1 | Status: SHIPPED | OUTPATIENT
Start: 2025-06-17 | End: 2026-06-17

## 2025-07-10 ENCOUNTER — TELEPHONE (OUTPATIENT)
Dept: RADIOLOGY | Facility: HOSPITAL | Age: 75
End: 2025-07-10
Payer: MEDICARE

## 2025-07-14 ENCOUNTER — TELEPHONE (OUTPATIENT)
Dept: RADIOLOGY | Facility: HOSPITAL | Age: 75
End: 2025-07-14
Payer: MEDICARE

## 2025-07-14 ENCOUNTER — OFFICE VISIT (OUTPATIENT)
Dept: OPTOMETRY | Facility: CLINIC | Age: 75
End: 2025-07-14
Payer: MEDICARE

## 2025-07-14 DIAGNOSIS — H52.4 PRESBYOPIA OF BOTH EYES: Primary | ICD-10-CM

## 2025-07-14 PROCEDURE — 99999 PR PBB SHADOW E&M-EST. PATIENT-LVL II: CPT | Mod: PBBFAC,,, | Performed by: OPTOMETRIST

## 2025-07-14 PROCEDURE — 1126F AMNT PAIN NOTED NONE PRSNT: CPT | Mod: CPTII,S$GLB,, | Performed by: OPTOMETRIST

## 2025-07-14 PROCEDURE — 3288F FALL RISK ASSESSMENT DOCD: CPT | Mod: CPTII,S$GLB,, | Performed by: OPTOMETRIST

## 2025-07-14 PROCEDURE — 1101F PT FALLS ASSESS-DOCD LE1/YR: CPT | Mod: CPTII,S$GLB,, | Performed by: OPTOMETRIST

## 2025-07-14 PROCEDURE — 1159F MED LIST DOCD IN RCRD: CPT | Mod: CPTII,S$GLB,, | Performed by: OPTOMETRIST

## 2025-07-14 PROCEDURE — 92014 COMPRE OPH EXAM EST PT 1/>: CPT | Mod: S$GLB,,, | Performed by: OPTOMETRIST

## 2025-07-14 PROCEDURE — 3044F HG A1C LEVEL LT 7.0%: CPT | Mod: CPTII,S$GLB,, | Performed by: OPTOMETRIST

## 2025-07-14 NOTE — PROGRESS NOTES
HPI    Pt is here today for ocular health examination. Denies pain/discomfort.  DLS: 6/9/2023 Dr. Chow  (-)Flashes   (-)Floaters   (-)Diplopia   (-)Headaches   (+)Itching   (-)Tearing  (-)Burning  (+)Dryness   (+)Photophobia  (-)Glare   (-)Blurred VA  Past Eye Sx: PC IOL OU  Eye Meds: Systane QD OU    Last edited by Kristan Gagnon, OD on 7/14/2025 10:16 AM.            Assessment /Plan     For exam results, see Encounter Report.    Presbyopia of both eyes      Eyeglass Final Rx       Eyeglass Final Rx         Sphere Cylinder Axis Add    Right -0.25 +1.00 165 +2.75    Left -1.00 +1.00 025 +2.75      Type: PAL    Expiration Date: 7/14/2026                   RTC in 1 year for annual eye exam unless changes noted sooner.

## 2025-07-15 ENCOUNTER — TELEPHONE (OUTPATIENT)
Dept: RADIOLOGY | Facility: HOSPITAL | Age: 75
End: 2025-07-15
Payer: MEDICARE

## 2025-07-21 ENCOUNTER — HOSPITAL ENCOUNTER (OUTPATIENT)
Dept: RADIOLOGY | Facility: HOSPITAL | Age: 75
Discharge: HOME | End: 2025-07-21
Attending: STUDENT IN AN ORGANIZED HEALTH CARE EDUCATION/TRAINING PROGRAM
Payer: MEDICARE

## 2025-07-21 DIAGNOSIS — R92.30 DENSE BREAST: ICD-10-CM

## 2025-07-21 PROCEDURE — 77062 BREAST TOMOSYNTHESIS BI: CPT | Mod: 26,,, | Performed by: RADIOLOGY

## 2025-07-21 PROCEDURE — 77066 DX MAMMO INCL CAD BI: CPT | Mod: 26,,, | Performed by: RADIOLOGY

## 2025-07-21 PROCEDURE — 77066 DX MAMMO INCL CAD BI: CPT | Mod: TC

## 2025-07-25 ENCOUNTER — TELEPHONE (OUTPATIENT)
Dept: CARDIOLOGY | Facility: CLINIC | Age: 75
End: 2025-07-25
Payer: MEDICARE

## 2025-07-25 NOTE — TELEPHONE ENCOUNTER
Copied from CRM #[id number]. Topic: Appointments - Appointment Rescheduling  >> Jul 25, 2025 11:51 AM Erich wrote:  Type:  Sooner Appointment Request    Patient is requesting a sooner appointment.  Patient declined first available appointment listed as well as another facility and provider .  Patient will not accept being placed on the waitlist and is requesting a message be sent to doctor.    Name of Caller: Patient  - Ocsar     When is the first available appointment?     Symptoms:  requesting a call back y have his wife test rescheduled     Would the patient rather a call back or a response via [facility]?  Call     Best Call Back Number:[phone]    Additional Information:  he would like to have her yest rescheduled

## 2025-08-22 ENCOUNTER — DOCUMENTATION ONLY (OUTPATIENT)
Dept: REHABILITATION | Facility: HOSPITAL | Age: 75
End: 2025-08-22
Payer: MEDICARE

## 2025-09-02 ENCOUNTER — OFFICE VISIT (OUTPATIENT)
Dept: PODIATRY | Facility: CLINIC | Age: 75
End: 2025-09-02
Payer: MEDICARE

## 2025-09-02 VITALS
HEART RATE: 82 BPM | BODY MASS INDEX: 25.07 KG/M2 | DIASTOLIC BLOOD PRESSURE: 81 MMHG | HEIGHT: 62 IN | WEIGHT: 136.25 LBS | SYSTOLIC BLOOD PRESSURE: 138 MMHG

## 2025-09-02 DIAGNOSIS — B35.1 TINEA UNGUIUM: ICD-10-CM

## 2025-09-02 DIAGNOSIS — G60.9 IDIOPATHIC PERIPHERAL NEUROPATHY: Primary | ICD-10-CM

## 2025-09-02 DIAGNOSIS — L84 CORN OR CALLUS: ICD-10-CM

## 2025-09-02 PROCEDURE — 1125F AMNT PAIN NOTED PAIN PRSNT: CPT | Mod: CPTII,S$GLB,, | Performed by: STUDENT IN AN ORGANIZED HEALTH CARE EDUCATION/TRAINING PROGRAM

## 2025-09-02 PROCEDURE — 1101F PT FALLS ASSESS-DOCD LE1/YR: CPT | Mod: CPTII,S$GLB,, | Performed by: STUDENT IN AN ORGANIZED HEALTH CARE EDUCATION/TRAINING PROGRAM

## 2025-09-02 PROCEDURE — 99999 PR PBB SHADOW E&M-EST. PATIENT-LVL III: CPT | Mod: PBBFAC,,, | Performed by: STUDENT IN AN ORGANIZED HEALTH CARE EDUCATION/TRAINING PROGRAM

## 2025-09-02 PROCEDURE — 1159F MED LIST DOCD IN RCRD: CPT | Mod: CPTII,S$GLB,, | Performed by: STUDENT IN AN ORGANIZED HEALTH CARE EDUCATION/TRAINING PROGRAM

## 2025-09-02 PROCEDURE — 3079F DIAST BP 80-89 MM HG: CPT | Mod: CPTII,S$GLB,, | Performed by: STUDENT IN AN ORGANIZED HEALTH CARE EDUCATION/TRAINING PROGRAM

## 2025-09-02 PROCEDURE — 3075F SYST BP GE 130 - 139MM HG: CPT | Mod: CPTII,S$GLB,, | Performed by: STUDENT IN AN ORGANIZED HEALTH CARE EDUCATION/TRAINING PROGRAM

## 2025-09-02 PROCEDURE — 3288F FALL RISK ASSESSMENT DOCD: CPT | Mod: CPTII,S$GLB,, | Performed by: STUDENT IN AN ORGANIZED HEALTH CARE EDUCATION/TRAINING PROGRAM

## 2025-09-02 PROCEDURE — 11056 PARNG/CUTG B9 HYPRKR LES 2-4: CPT | Mod: Q9,S$GLB,, | Performed by: STUDENT IN AN ORGANIZED HEALTH CARE EDUCATION/TRAINING PROGRAM

## 2025-09-02 PROCEDURE — 11721 DEBRIDE NAIL 6 OR MORE: CPT | Mod: XS,Q9,S$GLB, | Performed by: STUDENT IN AN ORGANIZED HEALTH CARE EDUCATION/TRAINING PROGRAM

## 2025-09-02 PROCEDURE — 99214 OFFICE O/P EST MOD 30 MIN: CPT | Mod: 25,S$GLB,, | Performed by: STUDENT IN AN ORGANIZED HEALTH CARE EDUCATION/TRAINING PROGRAM

## 2025-09-02 RX ORDER — CICLOPIROX 80 MG/ML
SOLUTION TOPICAL NIGHTLY
Qty: 1 EACH | Refills: 11 | Status: SHIPPED | OUTPATIENT
Start: 2025-09-02

## 2025-09-02 RX ORDER — AMMONIUM LACTATE 12 G/100G
140 CREAM TOPICAL DAILY
Qty: 140 G | Refills: 1 | Status: SHIPPED | OUTPATIENT
Start: 2025-09-02

## (undated) DEVICE — Device

## (undated) DEVICE — SOL BETADINE 5%

## (undated) DEVICE — SYR SLIP TIP 1CC

## (undated) DEVICE — SOL WATER STRL IRR 1000ML

## (undated) DEVICE — CASSETTE INFINITI

## (undated) DEVICE — GLOVE BIOGEL SKINSENSE PI 7.5

## (undated) DEVICE — GLOVE BIOGEL SKINSENSE PI 6.5